# Patient Record
Sex: FEMALE | Race: WHITE | NOT HISPANIC OR LATINO | Employment: OTHER | URBAN - METROPOLITAN AREA
[De-identification: names, ages, dates, MRNs, and addresses within clinical notes are randomized per-mention and may not be internally consistent; named-entity substitution may affect disease eponyms.]

---

## 2017-01-06 DIAGNOSIS — M79.7 FIBROMYALGIA: Primary | ICD-10-CM

## 2017-01-06 RX ORDER — LORAZEPAM 0.5 MG/1
TABLET ORAL
Qty: 30 TABLET | Refills: 5 | Status: CANCELLED | OUTPATIENT
Start: 2017-01-06

## 2017-01-06 NOTE — TELEPHONE ENCOUNTER
Controlled Substance Refill Request for LORAZEPAM 0.5 MG  Problem List Complete:  No     PROVIDER TO CONSIDER COMPLETION OF PROBLEM LIST AND OVERVIEW/CONTROLLED SUBSTANCE AGREEMENT    Last Written Prescription Date:  6-30-16  Last Fill Quantity: 30,   # refills: 5    Last Office Visit with [facility] primary care provider: 6-30-16    Future Office visit:     Controlled substance agreement on file: Yes:  Date 7-7-16.     Processing:  ?   checked in past 6 months?  No, route to RN

## 2017-01-08 ENCOUNTER — HOSPITAL ENCOUNTER (EMERGENCY)
Facility: CLINIC | Age: 42
Discharge: HOME | End: 2017-01-08
Attending: EMERGENCY MEDICINE | Admitting: EMERGENCY MEDICINE
Payer: MEDICAID

## 2017-01-08 VITALS
DIASTOLIC BLOOD PRESSURE: 78 MMHG | SYSTOLIC BLOOD PRESSURE: 125 MMHG | RESPIRATION RATE: 16 BRPM | TEMPERATURE: 98.1 F | HEIGHT: 62 IN | OXYGEN SATURATION: 99 % | BODY MASS INDEX: 21.77 KG/M2 | WEIGHT: 118.3 LBS

## 2017-01-08 DIAGNOSIS — R06.00 DYSPNEA, UNSPECIFIED TYPE: ICD-10-CM

## 2017-01-08 LAB
ALBUMIN SERPL-MCNC: 4.2 G/DL (ref 3.4–5)
ALBUMIN UR-MCNC: 10 MG/DL
ALP SERPL-CCNC: 52 U/L (ref 40–150)
ALT SERPL W P-5'-P-CCNC: 22 U/L (ref 0–50)
AMPHETAMINES UR QL SCN: NORMAL
ANION GAP SERPL CALCULATED.3IONS-SCNC: 9 MMOL/L (ref 3–14)
APPEARANCE UR: ABNORMAL
AST SERPL W P-5'-P-CCNC: 22 U/L (ref 0–45)
BACTERIA #/AREA URNS HPF: ABNORMAL /HPF
BARBITURATES UR QL: NORMAL
BASOPHILS # BLD AUTO: 0 10E9/L (ref 0–0.2)
BASOPHILS NFR BLD AUTO: 0.2 %
BENZODIAZ UR QL: NORMAL
BILIRUB SERPL-MCNC: 0.3 MG/DL (ref 0.2–1.3)
BILIRUB UR QL STRIP: NEGATIVE
BUN SERPL-MCNC: 22 MG/DL (ref 7–30)
CALCIUM SERPL-MCNC: 8.1 MG/DL (ref 8.5–10.1)
CANNABINOIDS UR QL SCN: NORMAL
CHLORIDE SERPL-SCNC: 107 MMOL/L (ref 94–109)
CO2 SERPL-SCNC: 24 MMOL/L (ref 20–32)
COCAINE UR QL: NORMAL
COLOR UR AUTO: YELLOW
CREAT SERPL-MCNC: 1.06 MG/DL (ref 0.52–1.04)
D DIMER PPP FEU-MCNC: 0.4 UG/ML FEU (ref 0–0.5)
DIFFERENTIAL METHOD BLD: ABNORMAL
EOSINOPHIL # BLD AUTO: 0.2 10E9/L (ref 0–0.7)
EOSINOPHIL NFR BLD AUTO: 1.3 %
ERYTHROCYTE [DISTWIDTH] IN BLOOD BY AUTOMATED COUNT: 13 % (ref 10–15)
ETHANOL UR QL SCN: NORMAL
GFR SERPL CREATININE-BSD FRML MDRD: 57 ML/MIN/1.7M2
GLUCOSE SERPL-MCNC: 115 MG/DL (ref 70–99)
GLUCOSE UR STRIP-MCNC: NEGATIVE MG/DL
HCG UR QL: NEGATIVE
HCT VFR BLD AUTO: 44.9 % (ref 35–47)
HGB BLD-MCNC: 15.1 G/DL (ref 11.7–15.7)
HGB UR QL STRIP: NEGATIVE
IMM GRANULOCYTES # BLD: 0 10E9/L (ref 0–0.4)
IMM GRANULOCYTES NFR BLD: 0.2 %
KETONES UR STRIP-MCNC: 5 MG/DL
LACTATE BLD-SCNC: 1.7 MMOL/L (ref 0.7–2.1)
LEUKOCYTE ESTERASE UR QL STRIP: NEGATIVE
LYMPHOCYTES # BLD AUTO: 1.8 10E9/L (ref 0.8–5.3)
LYMPHOCYTES NFR BLD AUTO: 13.3 %
MCH RBC QN AUTO: 30 PG (ref 26.5–33)
MCHC RBC AUTO-ENTMCNC: 33.6 G/DL (ref 31.5–36.5)
MCV RBC AUTO: 89 FL (ref 78–100)
MONOCYTES # BLD AUTO: 1.1 10E9/L (ref 0–1.3)
MONOCYTES NFR BLD AUTO: 8.1 %
MUCOUS THREADS #/AREA URNS LPF: PRESENT /LPF
NEUTROPHILS # BLD AUTO: 10.6 10E9/L (ref 1.6–8.3)
NEUTROPHILS NFR BLD AUTO: 76.9 %
NITRATE UR QL: NEGATIVE
NRBC # BLD AUTO: 0 10*3/UL
NRBC BLD AUTO-RTO: 0 /100
OPIATES UR QL SCN: NORMAL
PH UR STRIP: 5.5 PH (ref 5–7)
PLATELET # BLD AUTO: 137 10E9/L (ref 150–450)
POTASSIUM SERPL-SCNC: 4.1 MMOL/L (ref 3.4–5.3)
PROT SERPL-MCNC: 7.5 G/DL (ref 6.8–8.8)
RBC # BLD AUTO: 5.03 10E12/L (ref 3.8–5.2)
RBC #/AREA URNS AUTO: 3 /HPF (ref 0–2)
SODIUM SERPL-SCNC: 140 MMOL/L (ref 133–144)
SP GR UR STRIP: 1.02 (ref 1–1.03)
SQUAMOUS #/AREA URNS AUTO: 7 /HPF (ref 0–1)
TSH SERPL DL<=0.005 MIU/L-ACNC: 1.34 MU/L (ref 0.4–4)
URN SPEC COLLECT METH UR: ABNORMAL
UROBILINOGEN UR STRIP-MCNC: NORMAL MG/DL (ref 0–2)
WBC # BLD AUTO: 13.8 10E9/L (ref 4–11)
WBC #/AREA URNS AUTO: 4 /HPF (ref 0–2)

## 2017-01-08 PROCEDURE — 25000128 H RX IP 250 OP 636: Performed by: EMERGENCY MEDICINE

## 2017-01-08 PROCEDURE — 81001 URINALYSIS AUTO W/SCOPE: CPT | Mod: XU | Performed by: EMERGENCY MEDICINE

## 2017-01-08 PROCEDURE — 96361 HYDRATE IV INFUSION ADD-ON: CPT | Performed by: EMERGENCY MEDICINE

## 2017-01-08 PROCEDURE — 85025 COMPLETE CBC W/AUTO DIFF WBC: CPT | Performed by: EMERGENCY MEDICINE

## 2017-01-08 PROCEDURE — 99284 EMERGENCY DEPT VISIT MOD MDM: CPT | Mod: 25 | Performed by: EMERGENCY MEDICINE

## 2017-01-08 PROCEDURE — 80307 DRUG TEST PRSMV CHEM ANLYZR: CPT | Performed by: EMERGENCY MEDICINE

## 2017-01-08 PROCEDURE — 80320 DRUG SCREEN QUANTALCOHOLS: CPT | Mod: 59 | Performed by: EMERGENCY MEDICINE

## 2017-01-08 PROCEDURE — 83605 ASSAY OF LACTIC ACID: CPT | Performed by: EMERGENCY MEDICINE

## 2017-01-08 PROCEDURE — 81025 URINE PREGNANCY TEST: CPT | Performed by: EMERGENCY MEDICINE

## 2017-01-08 PROCEDURE — 80053 COMPREHEN METABOLIC PANEL: CPT | Performed by: EMERGENCY MEDICINE

## 2017-01-08 PROCEDURE — 99283 EMERGENCY DEPT VISIT LOW MDM: CPT | Mod: Z6 | Performed by: EMERGENCY MEDICINE

## 2017-01-08 PROCEDURE — 85379 FIBRIN DEGRADATION QUANT: CPT | Performed by: EMERGENCY MEDICINE

## 2017-01-08 PROCEDURE — 84443 ASSAY THYROID STIM HORMONE: CPT | Performed by: EMERGENCY MEDICINE

## 2017-01-08 PROCEDURE — 96360 HYDRATION IV INFUSION INIT: CPT | Performed by: EMERGENCY MEDICINE

## 2017-01-08 RX ADMIN — SODIUM CHLORIDE 1000 ML: 9 INJECTION, SOLUTION INTRAVENOUS at 10:07

## 2017-01-08 ASSESSMENT — ENCOUNTER SYMPTOMS
EYE REDNESS: 0
VOMITING: 0
CONFUSION: 0
HEADACHES: 0
FEVER: 0
DIFFICULTY URINATING: 0
ABDOMINAL PAIN: 0
CHILLS: 1
NAUSEA: 0
SHORTNESS OF BREATH: 1
UNEXPECTED WEIGHT CHANGE: 0
COLOR CHANGE: 0
NECK STIFFNESS: 0
ARTHRALGIAS: 0

## 2017-01-08 NOTE — ED NOTES
"Clemencia comes to the ED today concerned she may be in a \"thyroid storm\" and would like to be checked.  This morning she has experienced SOB or \"air hunger\", tachycardia (monitor displays SR without ectopy with rate 98 to 107), and \"one after the other hot flashes\".  She appears anxious however is able to talk in full sentences.  Denies CP.  "

## 2017-01-08 NOTE — DISCHARGE INSTRUCTIONS
Shortness of Breath (Dyspnea)  Shortness of breath is the feeling that you can't catch your breath or get enough air. It is also known as dyspnea.  Dyspnea can be caused by many different conditions. They include:    Acute asthma attack.    Worsening of chronic lung diseases such as chronic bronchitis and emphysema.     Heart failure. This is when weak heart muscle allows extra fluid to collect in the lungs.    Panic attacks or anxiety. Fear can cause rapid breathing (hyperventilation).    Pneumonia, or an infection in the lung tissue.    Exposure to toxic substances, fumes, smoke, or certain medicines.    Blood clot in the lung (pulmonary embolism). This is often from a piece of blood clot in a deep vein of the leg (deep vein thrombosis) that breaks off and travels to the lungs.     Heart attack or heart-related chest pain (angina).    Anemia.    Collapsed lung (pneumothorax).    Dehydration.    Pregnancy.  Based on your visit today, the exact cause of your shortness of breath is not certain. Your tests don t show any of the serious causes of dyspnea. You may need other tests to find out if you have a serious problem. It s important to watch for any new symptoms or symptoms that get worse. Follow up with your healthcare provider as directed.  Home care  Follow these tips to take care of yourself at home:    When your symptoms are better, go back to your usual activities.    If you smoke, you should stop. Join a quit-smoking program or ask your healthcare provider for help.    Eat a healthy diet and get plenty of sleep.    Get regular exercise. Talk with your healthcare provider before starting to exercise, especially if you have other medical problems.    Cut down on the amount of caffeine and stimulants you consume.  Follow-up care  Follow up with your healthcare provider, or as advised.  If tests were done, you will be told if your treatment needs to be changed. You can call as directed for the  results.  (Note: If an X-ray was taken, a specialist will review it. You will be notified of any new findings that may affect your care.)  Call 911 or get immediate medical care  Shortness of breath may be a sign of a serious medical problem. For example, it may be a problem with your heart or lungs. Call 911 if you have worsening shortness of breath or trouble breathing, especially with any of the symptoms below:    You are confused or it s difficult to wake you.    You faint or lose consciousness.    You have a fast heartbeat, or your heartbeat is irregular.     You are coughing up blood.    You have pain in your chest, arm, shoulder, neck, or upper back.    You break out in a sweat.  When to seek medical advice  Call your healthcare provider right away if any of these occur:    Slight shortness of breath or wheezing     Redness, pain or swelling in your leg, arm, or other body area    Swelling in both legs or ankles    Fast weight gain    Dizziness or weakness    Fever of 100.4 F (38 C) or higher, or as directed by your healthcare provider    1036-7693 [employer]. [address] [place] [address]. All rights reserved. This information is not intended as a substitute for professional medical care. Always follow your healthcare professional's instructions.      Continue current medications

## 2017-01-08 NOTE — ED AVS SNAPSHOT
[facility], Emergency Department    [address] [address]    Phone:  [phone]                                       Clemencia Rosales   MRN: [id number]    Department:  [facility], Emergency Department   Date of Visit:  1/8/2017           Patient Information     Date Of Birth          1975        Your diagnoses for this visit were:     Dyspnea, unspecified type        You were seen by Trevor Graham MD.      Follow-up Information     Follow up with Neda Damon APRN CNP.    Specialty:  Nurse Practitioner    Contact information:    [facility]  [address] [place] [address]  [phone]          Discharge Instructions         Shortness of Breath (Dyspnea)  Shortness of breath is the feeling that you can't catch your breath or get enough air. It is also known as dyspnea.  Dyspnea can be caused by many different conditions. They include:    Acute asthma attack.    Worsening of chronic lung diseases such as chronic bronchitis and emphysema.     Heart failure. This is when weak heart muscle allows extra fluid to collect in the lungs.    Panic attacks or anxiety. Fear can cause rapid breathing (hyperventilation).    Pneumonia, or an infection in the lung tissue.    Exposure to toxic substances, fumes, smoke, or certain medicines.    Blood clot in the lung (pulmonary embolism). This is often from a piece of blood clot in a deep vein of the leg (deep vein thrombosis) that breaks off and travels to the lungs.     Heart attack or heart-related chest pain (angina).    Anemia.    Collapsed lung (pneumothorax).    Dehydration.    Pregnancy.  Based on your visit today, the exact cause of your shortness of breath is not certain. Your tests don t show any of the serious causes of dyspnea. You may need other tests to find out if you have a serious problem. It s important to watch for any new symptoms or symptoms that get worse. Follow up with your healthcare  provider as directed.  Home care  Follow these tips to take care of yourself at home:    When your symptoms are better, go back to your usual activities.    If you smoke, you should stop. Join a quit-smoking program or ask your healthcare provider for help.    Eat a healthy diet and get plenty of sleep.    Get regular exercise. Talk with your healthcare provider before starting to exercise, especially if you have other medical problems.    Cut down on the amount of caffeine and stimulants you consume.  Follow-up care  Follow up with your healthcare provider, or as advised.  If tests were done, you will be told if your treatment needs to be changed. You can call as directed for the results.  (Note: If an X-ray was taken, a specialist will review it. You will be notified of any new findings that may affect your care.)  Call 911 or get immediate medical care  Shortness of breath may be a sign of a serious medical problem. For example, it may be a problem with your heart or lungs. Call 911 if you have worsening shortness of breath or trouble breathing, especially with any of the symptoms below:    You are confused or it s difficult to wake you.    You faint or lose consciousness.    You have a fast heartbeat, or your heartbeat is irregular.     You are coughing up blood.    You have pain in your chest, arm, shoulder, neck, or upper back.    You break out in a sweat.  When to seek medical advice  Call your healthcare provider right away if any of these occur:    Slight shortness of breath or wheezing     Redness, pain or swelling in your leg, arm, or other body area    Swelling in both legs or ankles    Fast weight gain    Dizziness or weakness    Fever of 100.4 F (38 C) or higher, or as directed by your healthcare provider    [id number] [employer]. [address] [place] [address], [place] [address]. All rights reserved. This information is not intended as a substitute for professional medical care. Always follow  your healthcare professional's instructions.      Continue current medications    24 Hour Appointment Hotline       To make an appointment at any [facility], call [phone] ([phone]). If you don't have a family doctor or clinic, we will help you find one. [facility] clinics are conveniently located to serve the needs of you and your family.             Review of your medicines      Our records show that you are taking the medicines listed below. If these are incorrect, please call your family doctor or clinic.        Dose / Directions Last dose taken    albuterol 108 (90 BASE) MCG/ACT Inhaler   Commonly known as:  albuterol   Dose:  2 puff   Quantity:  1 Inhaler        Inhale 2 puffs into the lungs every 4 hours as needed   Refills:  11        aspirin 325 MG tablet        Take  by mouth daily. Takes 2 tablets as needed   Refills:  0        BENADRYL PO        AS NEEDED   Refills:  0        cyclobenzaprine 10 MG tablet   Commonly known as:  FLEXERIL   Dose:  5-10 mg   Quantity:  30 tablet        Take 0.5-1 tablets (5-10 mg) by mouth 3 times daily as needed for muscle spasms   Refills:  5        fluticasone 50 MCG/ACT spray   Commonly known as:  FLONASE   Dose:  2 spray   Quantity:  16 g        Spray 2 sprays into both nostrils daily   Refills:  11        gabapentin 300 MG capsule   Commonly known as:  NEURONTIN   Dose:  300 mg   Quantity:  90 capsule        Take 1 capsule (300 mg) by mouth 3 times daily   Refills:  5        hydrocortisone 2.5 % cream        Refills:  2        LORazepam 0.5 MG tablet   Commonly known as:  ATIVAN   Quantity:  30 tablet        Take 1 tablet by mouth every 8 hours as needed for anxiety. 30 tablets to last 30 days.   Refills:  5        metoprolol 50 MG 24 hr tablet   Commonly known as:  TOPROL-XL   Dose:  50 mg   Quantity:  90 tablet        Take 1 tablet (50 mg) by mouth daily   Refills:  0        phenylephrine 0.25 % spray   Commonly known as:  CASTRO-SYNEPHRINE   Dose:  1  spray        Spray 1 spray into both nostrils every 4 hours as needed for congestion   Refills:  0        SYNTHROID 112 MCG tablet   Generic drug:  levothyroxine        Take  by mouth daily.   Refills:  0        VITAMIN D PO        Refills:  0                Procedures and tests performed during your visit     CBC with platelets differential    Comprehensive metabolic panel    D dimer quantitative    Drug abuse screen 6 urine (tox)    HCG qualitative urine    Lactic acid whole blood    TSH with free T4 reflex    UA with Microscopic reflex to Culture    Weigh patient      Orders Needing Specimen Collection     None      Pending Results     Date and Time Order Name Status Description    1/8/2017 0952 Lactic acid whole blood In process             Pending Culture Results     No orders found from 1/7/2017 to 1/9/2017.            Thank you for choosing [facility]       Thank you for choosing [facility] for your care. Our goal is always to provide you with excellent care. Hearing back from our patients is one way we can continue to improve our services. Please take a few minutes to complete the written survey that you may receive in the mail after you visit with us. Thank you!        [facility] Information     [employer] gives you secure access to your electronic health record. If you see a primary care provider, you can also send messages to your care team and make appointments. If you have questions, please call your primary care clinic.  If you do not have a primary care provider, please call [phone] and they will assist you.        Care EveryWhere ID     This is your Care EveryWhere ID. This could be used by other organizations to access your [facility] medical records  [id number]        After Visit Summary       This is your record. Keep this with you and show to your community pharmacist(s) and doctor(s) at your next visit.

## 2017-01-08 NOTE — ED PROVIDER NOTES
"  History     Chief Complaint   Patient presents with     Shortness of Breath     Fatigue     Menopausal Sx     Palpitations     HPI  Clemencia JESSIKA Rosales is a 41 year old female with a history of fibromyalgia and thyrotoxicosis (2002) who presents to the Emergency Department with multiple complaints. She states that she came in today because of hot flashes, chills and \"air hunger\". Patient reports that she has felt unwell for since Winter began and thought her symptoms would improve. Patient has had these symptoms since her thyroidectomy. She states that she is sensitive to seasonal changes, atmosphere changes, and full moons. The patient states that her hot flashes have increased in frequency. Patient states that her electrolytes are often out of balance. She states that she is concerned of a \"thyroid storm\". Patient reports that last night she was exposed to her allergens and took Benadryl yesterday night.     No weight loss. No nausea or vomiting. Patient states that her right ring finger becomes purple in the evening and this has been ongoing for the past 2 years.     She reports a history of Grave's disease and trigeminal neuralgia. She reports she has has sinus tach since 2002. Patient states that for the past few years she has been on a beta blocker.   She states that her menstrual periods have been regular.    I have reviewed the Medications, Allergies, Past Medical and Surgical History, and Social History in the [facility] system.    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 " "[facility] [place]; lung bx 2007 bronchiolitis and bronchiectasis,; had severe viral pneumonia age 15     MEDICAL HISTORY OF -      20 ativan per month (menstral period/BOOP)     Sinus tachycardia      Arthritis      Head pain 1/16/2015     Temporary low platelet count (H) 6/12/2014     PAST SURGICAL HISTORY  Past Surgical History   Procedure Laterality Date     Thoracoscopic biopsy rib       x4     FAMILY HISTORY  Family History   Problem Relation Age of Onset     DIABETES Paternal Aunt      DIABETES Paternal Uncle      Thyroid Disease No family hx of      female family members has have thyroid issues     Asthma       Asthma       Asthma Paternal Uncle      SOCIAL HISTORY  Social History   Substance Use Topics     Smoking status: Never Smoker      Smokeless tobacco: Never Used     Alcohol Use: No     MEDICATIONS  No current facility-administered medications for this encounter.     Current Outpatient Prescriptions   Medication     cyclobenzaprine (FLEXERIL) 10 MG tablet     gabapentin (NEURONTIN) 300 MG capsule     metoprolol (TOPROL-XL) 50 MG 24 hr tablet     LORazepam (ATIVAN) 0.5 MG tablet     fluticasone (FLONASE) 50 MCG/ACT nasal spray     albuterol (ALBUTEROL) 108 (90 BASE) MCG/ACT inhaler     hydrocortisone 2.5 % cream     phenylephrine (CASTRO-SYNEPHRINE) 0.25 % nasal spray     Cholecalciferol (VITAMIN D PO)     levothyroxine (SYNTHROID) 112 MCG tablet     aspirin 325 MG tablet     BENADRYL OR     ALLERGIES  Allergies   Allergen Reactions     Sulfa Drugs Other (See Comments)     Never taken medication however \"was told to put it down just in case\"       Review of Systems   Constitutional: Positive for chills. Negative for fever and unexpected weight change.   HENT: Negative for congestion.    Eyes: Negative for redness.   Respiratory: Positive for shortness of breath.    Cardiovascular: Negative for chest pain.   Gastrointestinal: Negative for nausea, vomiting and abdominal pain.   Genitourinary: Negative for " "difficulty urinating.   Musculoskeletal: Negative for arthralgias and neck stiffness.   Skin: Negative for color change.   Neurological: Negative for headaches.   Psychiatric/Behavioral: Negative for confusion.   All other systems reviewed and are negative.      Physical Exam   BP: (!) 150/96 mmHg  Heart Rate: 106  Temp: 96.4  F (35.8  C)  Resp: 18  Height: 157.5 cm (5' 2\")  Weight: 50.803 kg (112 lb)  SpO2: 98 %  Physical Exam   Constitutional: No distress.   HENT:   Head: Atraumatic.   Mouth/Throat: Oropharynx is clear and moist. No oropharyngeal exudate.   Eyes: Pupils are equal, round, and reactive to light. No scleral icterus.   Cardiovascular: Normal heart sounds and intact distal pulses.    Pulmonary/Chest: Breath sounds normal. No respiratory distress.   Abdominal: Soft. Bowel sounds are normal. There is no tenderness.   Musculoskeletal: She exhibits no edema or tenderness.   Skin: Skin is warm. No rash noted. She is not diaphoretic.       ED Course   Procedures             Results for orders placed or performed during the hospital encounter of 01/08/17 (from the past 24 hour(s))   CBC with platelets differential   Result Value Ref Range    WBC 13.8 (H) 4.0 - 11.0 10e9/L    RBC Count 5.03 3.8 - 5.2 10e12/L    Hemoglobin 15.1 11.7 - 15.7 g/dL    Hematocrit 44.9 35.0 - 47.0 %    MCV 89 78 - 100 fl    MCH 30.0 26.5 - 33.0 pg    MCHC 33.6 31.5 - 36.5 g/dL    RDW 13.0 10.0 - 15.0 %    Platelet Count 137 (L) 150 - 450 10e9/L    Diff Method Automated Method     % Neutrophils 76.9 %    % Lymphocytes 13.3 %    % Monocytes 8.1 %    % Eosinophils 1.3 %    % Basophils 0.2 %    % Immature Granulocytes 0.2 %    Nucleated RBCs 0 0 /100    Absolute Neutrophil 10.6 (H) 1.6 - 8.3 10e9/L    Absolute Lymphocytes 1.8 0.8 - 5.3 10e9/L    Absolute Monocytes 1.1 0.0 - 1.3 10e9/L    Absolute Eosinophils 0.2 0.0 - 0.7 10e9/L    Absolute Basophils 0.0 0.0 - 0.2 10e9/L    Abs Immature Granulocytes 0.0 0 - 0.4 10e9/L    Absolute " Nucleated RBC 0.0    Comprehensive metabolic panel   Result Value Ref Range    Sodium 140 133 - 144 mmol/L    Potassium 4.1 3.4 - 5.3 mmol/L    Chloride 107 94 - 109 mmol/L    Carbon Dioxide 24 20 - 32 mmol/L    Anion Gap 9 3 - 14 mmol/L    Glucose 115 (H) 70 - 99 mg/dL    Urea Nitrogen 22 7 - 30 mg/dL    Creatinine 1.06 (H) 0.52 - 1.04 mg/dL    GFR Estimate 57 (L) >60 mL/min/1.7m2    GFR Estimate If Black 69 >60 mL/min/1.7m2    Calcium 8.1 (L) 8.5 - 10.1 mg/dL    Bilirubin Total 0.3 0.2 - 1.3 mg/dL    Albumin 4.2 3.4 - 5.0 g/dL    Protein Total 7.5 6.8 - 8.8 g/dL    Alkaline Phosphatase 52 40 - 150 U/L    ALT 22 0 - 50 U/L    AST 22 0 - 45 U/L   D dimer quantitative   Result Value Ref Range    D Dimer 0.4 0.0 - 0.50 ug/ml FEU   TSH with free T4 reflex   Result Value Ref Range    TSH 1.34 0.40 - 4.00 mU/L   Lactic acid whole blood   Result Value Ref Range    Lactic Acid 1.7 0.7 - 2.1 mmol/L   Drug abuse screen 6 urine (tox)   Result Value Ref Range    Amphetamine Qual Urine  NEG     Negative   Cutoff for a negative amphetamine is 500 ng/mL or less.      Barbiturates Qual Urine  NEG     Negative   Cutoff for a negative barbiturate is 200 ng/mL or less.      Benzodiazepine Qual Urine  NEG     Negative   Cutoff for a negative benzodiazepine is 200 ng/mL or less.      Cannabinoids Qual Urine  NEG     Negative   Cutoff for a negative cannabinoid is 50 ng/mL or less.      Cocaine Qual Urine  NEG     Negative   Cutoff for a negative cocaine is 300 ng/mL or less.      Ethanol Qual Urine  NEG     Negative   Cutoff for a negative urine ethanol is 0.05 g/dL or less      Opiates Qualitative Urine  NEG     Negative   Cutoff for a negative opiate is 300 ng/mL or less.     HCG qualitative urine   Result Value Ref Range    HCG Qual Urine Negative NEG   UA with Microscopic reflex to Culture   Result Value Ref Range    Color Urine Yellow     Appearance Urine Slightly Cloudy     Glucose Urine Negative NEG mg/dL    Bilirubin Urine  "Negative NEG    Ketones Urine 5 (A) NEG mg/dL    Specific Gravity Urine 1.025 1.003 - 1.035    Blood Urine Negative NEG    pH Urine 5.5 5.0 - 7.0 pH    Protein Albumin Urine 10 (A) NEG mg/dL    Urobilinogen mg/dL Normal 0.0 - 2.0 mg/dL    Nitrite Urine Negative NEG    Leukocyte Esterase Urine Negative NEG    Source Midstream Urine     WBC Urine 4 (H) 0 - 2 /HPF    RBC Urine 3 (H) 0 - 2 /HPF    Bacteria Urine Few (A) NEG /HPF    Squamous Epithelial /HPF Urine 7 (H) 0 - 1 /HPF    Mucous Urine Present (A) NEG /LPF       Labs Ordered and Resulted from Time of ED Arrival Up to the Time of Departure from the ED   CBC WITH PLATELETS DIFFERENTIAL - Abnormal; Notable for the following:     WBC 13.8 (*)     Platelet Count 137 (*)     Absolute Neutrophil 10.6 (*)     All other components within normal limits   COMPREHENSIVE METABOLIC PANEL - Abnormal; Notable for the following:     Glucose 115 (*)     Creatinine 1.06 (*)     GFR Estimate 57 (*)     Calcium 8.1 (*)     All other components within normal limits   ROUTINE UA WITH MICROSCOPIC REFLEX TO CULTURE - Abnormal; Notable for the following:     Ketones Urine 5 (*)     Protein Albumin Urine 10 (*)     WBC Urine 4 (*)     RBC Urine 3 (*)     Bacteria Urine Few (*)     Squamous Epithelial /HPF Urine 7 (*)     Mucous Urine Present (*)     All other components within normal limits   DRUG ABUSE SCREEN 6 CHEM DEP URINE ([facility])   HCG QUALITATIVE URINE   D DIMER QUANTITATIVE   TSH WITH FREE T4 REFLEX   MEASURE WEIGHT       Assessments & Plan (with Medical Decision Making)   41-year-old female with long-standing history of Graves' disease presents with multiple complaints including air hunger, malaise, hot flashes and other symptoms as noted above.  Most of these symptoms have been ongoing for the past multiple years.  Patient was most concerned about \"thyroid storm\". Her exam was largely unremarkable with exception of mild anxiety.  Laboratories were obtained revealing a CBC with " "slightly elevated white blood count of 13.8 consistent with stress response.  Urinalysis revealed scant white cells and red cells but was contaminated with squamous epithelial cells and without clinical correlation of infection.  Lactic acid was 1.7 and pregnancy test was negative.  Comprehensive metabolic panel revealed slightly elevated glucose at 115 consistent with stress response and slightly elevated creatinine.  D-dimer was negative and TSH was 1.34.  I reassured the patient that she was not in thyroid storm.  She stated that she felt much improved after a solitary liter of fluids.  I had recommended a chest x-ray for her symptoms of \"air hunger\" however, patient declined this stating that she felt improved and would follow up with her primary physician.  I suspect that anxiety may be playing a role in her symptoms.  I have reviewed the nursing notes.    I have reviewed the findings, diagnosis, plan and need for follow up with the patient.    Discharge Medication List as of 1/8/2017 12:09 PM          Final diagnoses:   Dyspnea, unspecified type     I, Deepak Erazo, am serving as a trained medical scribe to document services personally performed by Shahid Graham MD, based on the provider's statements to me.      I, Shahid Graham MD, was physically present and have reviewed and verified the accuracy of this note documented by Deepak Erazo.    1/8/2017   [facility], [place], EMERGENCY DEPARTMENT      Trevor Graham MD  01/08/17 1609  "

## 2017-01-08 NOTE — ED AVS SNAPSHOT
[facility], [place], Emergency Department    [address] [place] [address]    Phone:  [phone]                                       Clemencia Rosales   MRN: [id number]    Department:  [facility], Emergency Department   Date of Visit:  1/8/2017           After Visit Summary Signature Page     I have received my discharge instructions, and my questions have been answered. I have discussed any challenges I see with this plan with the nurse or doctor.    ..........................................................................................................................................  Patient/Patient Representative Signature      ..........................................................................................................................................  Patient Representative Print Name and Relationship to Patient    ..................................................               ................................................  Date                                            Time    ..........................................................................................................................................  Reviewed by Signature/Title    ...................................................              ..............................................  Date                                                            Time

## 2017-01-09 NOTE — TELEPHONE ENCOUNTER
Clemencia is due a face-to-face appointment for her lorazepam refills. Please call her to schedule an appointment.

## 2017-01-16 ENCOUNTER — OFFICE VISIT (OUTPATIENT)
Dept: FAMILY MEDICINE | Facility: CLINIC | Age: 42
End: 2017-01-16
Payer: MEDICAID

## 2017-01-16 VITALS
DIASTOLIC BLOOD PRESSURE: 68 MMHG | BODY MASS INDEX: 22.08 KG/M2 | TEMPERATURE: 98 F | HEART RATE: 127 BPM | SYSTOLIC BLOOD PRESSURE: 98 MMHG | OXYGEN SATURATION: 95 % | HEIGHT: 62 IN | WEIGHT: 120 LBS

## 2017-01-16 DIAGNOSIS — D69.6 TEMPORARY LOW PLATELET COUNT (H): ICD-10-CM

## 2017-01-16 DIAGNOSIS — Z86.79 H/O SINUS TACHYCARDIA: ICD-10-CM

## 2017-01-16 DIAGNOSIS — J44.81 BRONCHIOLITIS OBLITERANS (H): ICD-10-CM

## 2017-01-16 DIAGNOSIS — G44.219 EPISODIC TENSION-TYPE HEADACHE, NOT INTRACTABLE: ICD-10-CM

## 2017-01-16 DIAGNOSIS — M79.7 FIBROMYALGIA: Primary | ICD-10-CM

## 2017-01-16 LAB
BASOPHILS # BLD AUTO: 0 10E9/L (ref 0–0.2)
BASOPHILS NFR BLD AUTO: 0.5 %
DIFFERENTIAL METHOD BLD: NORMAL
EOSINOPHIL # BLD AUTO: 0.3 10E9/L (ref 0–0.7)
EOSINOPHIL NFR BLD AUTO: 5.1 %
ERYTHROCYTE [DISTWIDTH] IN BLOOD BY AUTOMATED COUNT: 12.9 % (ref 10–15)
HCT VFR BLD AUTO: 42.5 % (ref 35–47)
HGB BLD-MCNC: 14 G/DL (ref 11.7–15.7)
LYMPHOCYTES # BLD AUTO: 1.5 10E9/L (ref 0.8–5.3)
LYMPHOCYTES NFR BLD AUTO: 25.7 %
MCH RBC QN AUTO: 30 PG (ref 26.5–33)
MCHC RBC AUTO-ENTMCNC: 32.9 G/DL (ref 31.5–36.5)
MCV RBC AUTO: 91 FL (ref 78–100)
MONOCYTES # BLD AUTO: 0.5 10E9/L (ref 0–1.3)
MONOCYTES NFR BLD AUTO: 8.4 %
NEUTROPHILS # BLD AUTO: 3.5 10E9/L (ref 1.6–8.3)
NEUTROPHILS NFR BLD AUTO: 60.3 %
PLATELET # BLD AUTO: 165 10E9/L (ref 150–450)
RBC # BLD AUTO: 4.66 10E12/L (ref 3.8–5.2)
WBC # BLD AUTO: 5.8 10E9/L (ref 4–11)

## 2017-01-16 PROCEDURE — 99214 OFFICE O/P EST MOD 30 MIN: CPT | Performed by: NURSE PRACTITIONER

## 2017-01-16 PROCEDURE — 36415 COLL VENOUS BLD VENIPUNCTURE: CPT | Performed by: NURSE PRACTITIONER

## 2017-01-16 PROCEDURE — 85025 COMPLETE CBC W/AUTO DIFF WBC: CPT | Performed by: NURSE PRACTITIONER

## 2017-01-16 RX ORDER — LORAZEPAM 0.5 MG/1
TABLET ORAL
Qty: 30 TABLET | Refills: 5 | Status: SHIPPED | OUTPATIENT
Start: 2017-01-16 | End: 2017-08-10

## 2017-01-16 RX ORDER — FLUTICASONE PROPIONATE 50 MCG
2 SPRAY, SUSPENSION (ML) NASAL DAILY
Qty: 16 G | Refills: 11 | Status: SHIPPED | OUTPATIENT
Start: 2017-01-16 | End: 2018-03-15

## 2017-01-16 RX ORDER — CYCLOBENZAPRINE HCL 10 MG
5-10 TABLET ORAL 3 TIMES DAILY PRN
Qty: 30 TABLET | Refills: 5 | Status: SHIPPED | OUTPATIENT
Start: 2017-01-16 | End: 2017-10-02

## 2017-01-16 RX ORDER — METOPROLOL SUCCINATE 50 MG/1
50 TABLET, EXTENDED RELEASE ORAL DAILY
Qty: 90 TABLET | Refills: 3 | Status: SHIPPED | OUTPATIENT
Start: 2017-01-16 | End: 2018-02-04

## 2017-01-16 RX ORDER — GABAPENTIN 300 MG/1
300 CAPSULE ORAL 3 TIMES DAILY
Qty: 90 CAPSULE | Refills: 11 | Status: SHIPPED | OUTPATIENT
Start: 2017-01-16 | End: 2018-03-08

## 2017-01-16 RX ORDER — ALBUTEROL SULFATE 90 UG/1
1-2 AEROSOL, METERED RESPIRATORY (INHALATION) EVERY 4 HOURS PRN
Qty: 1 INHALER | Refills: 11 | Status: SHIPPED | OUTPATIENT
Start: 2017-01-16 | End: 2018-02-08

## 2017-01-16 ASSESSMENT — ANXIETY QUESTIONNAIRES
2. NOT BEING ABLE TO STOP OR CONTROL WORRYING: SEVERAL DAYS
GAD7 TOTAL SCORE: 6
6. BECOMING EASILY ANNOYED OR IRRITABLE: SEVERAL DAYS
1. FEELING NERVOUS, ANXIOUS, OR ON EDGE: SEVERAL DAYS
3. WORRYING TOO MUCH ABOUT DIFFERENT THINGS: SEVERAL DAYS
5. BEING SO RESTLESS THAT IT IS HARD TO SIT STILL: NOT AT ALL
7. FEELING AFRAID AS IF SOMETHING AWFUL MIGHT HAPPEN: NOT AT ALL

## 2017-01-16 ASSESSMENT — PATIENT HEALTH QUESTIONNAIRE - PHQ9: 5. POOR APPETITE OR OVEREATING: MORE THAN HALF THE DAYS

## 2017-01-16 NOTE — MR AVS SNAPSHOT
After Visit Summary   1/16/2017    Clemencia Rosales    MRN: [id number]           Patient Information     Date Of Birth          1975        Visit Information        Provider Department      1/16/2017 1:20 PM Neda Damon APRN CNP [facility]        Today's Diagnoses     Fibromyalgia    -  1     H/O sinus tachycardia         Episodic tension-type headache, not intractable         obliterative bronchiolitis         Temporary low platelet count (H)           Care Instructions    Continue your medications as prescribed.  Use your lorazepam sparingly.    Clinic appointments with me cathie 6 months, sooner if problems.     Platelets: your platelets have been low.  I would like you to follow up with hematology for a consultation.            Follow-ups after your visit        Additional Services     ONC/HEME ADULT REFERRAL       Your provider has referred you to: [facility]: [facility] [phone]    Please be aware that coverage of these services is subject to the terms and limitations of your health insurance plan.  Call member services at your health plan with any benefit or coverage questions.      Please bring the following with you to your appointment:    (1) Any X-Rays, CTs or MRIs which have been performed.  Contact the facility where they were done to arrange for  prior to your scheduled appointment.   (2) List of current medications  (3) This referral request   (4) Any documents/labs given to you for this referral                  Who to contact     If you have questions or need follow up information about today's clinic visit or your schedule please contact [facility] directly at [phone].  Normal or non-critical lab and imaging results will be communicated to you by MyChart, letter or phone within 4 business days after the clinic has received the results. If you do not hear from us  "within 7 days, please contact the clinic through [employer] or phone. If you have a critical or abnormal lab result, we will notify you by phone as soon as possible.  Submit refill requests through [employer] or call your pharmacy and they will forward the refill request to us. Please allow 3 business days for your refill to be completed.          Additional Information About Your Visit        [employer] Information     [employer] gives you secure access to your electronic health record. If you see a primary care provider, you can also send messages to your care team and make appointments. If you have questions, please call your primary care clinic.  If you do not have a primary care provider, please call [phone] and they will assist you.        Care EveryWhere ID     This is your Care EveryWhere ID. This could be used by other organizations to access your [place] medical records  [id number]        Your Vitals Were     Pulse Temperature Height BMI (Body Mass Index) Pulse Oximetry Last Period    127 98  F (36.7  C) (Oral) 5' 2\" (1.575 m) 21.94 kg/m2 95% 12/20/2016       Blood Pressure from Last 3 Encounters:   01/16/17 98/68   01/08/17 125/78   06/30/16 108/75    Weight from Last 3 Encounters:   01/16/17 120 lb (54.432 kg)   01/08/17 118 lb 4.8 oz (53.661 kg)   06/30/16 112 lb (50.803 kg)              We Performed the Following     CBC with platelets and differential     ONC/HEME ADULT REFERRAL          Today's Medication Changes          These changes are accurate as of: 1/16/17  1:42 PM.  If you have any questions, ask your nurse or doctor.               These medicines have changed or have updated prescriptions.        Dose/Directions    albuterol 108 (90 BASE) MCG/ACT Inhaler   Commonly known as:  albuterol   This may have changed:  how much to take   Used for:  Bronchiolitis obliterans (H)   Changed by:  Neda Damon APRN CNP        Dose:  1-2 puff   Inhale 1-2 puffs into the lungs every 4 hours as " needed   Quantity:  1 Inhaler   Refills:  11            Where to get your medicines      These medications were sent to [id number] - [facility] [address] [place], [place] [address]GHAZALA AVJAD AT [facility] & [address] [place] [address]    Hours:  24-hours Phone:  [phone]    - albuterol 108 (90 BASE) MCG/ACT Inhaler  - cyclobenzaprine 10 MG tablet  - fluticasone 50 MCG/ACT spray  - gabapentin 300 MG capsule  - metoprolol 50 MG 24 hr tablet      Some of these will need a paper prescription and others can be bought over the counter.  Ask your nurse if you have questions.     Bring a paper prescription for each of these medications    - LORazepam 0.5 MG tablet             Primary Care Provider Office Phone # Fax #    ARDEN Patterson [phone]       [facility] [address] [place] [address]        Thank you!     Thank you for choosing [facility]  for your care. Our goal is always to provide you with excellent care. Hearing back from our patients is one way we can continue to improve our services. Please take a few minutes to complete the written survey that you may receive in the mail after your visit with us. Thank you!             Your Updated Medication List - Protect others around you: Learn how to safely use, store and throw away your medicines at www.disposemymeds.org.          This list is accurate as of: 1/16/17  1:42 PM.  Always use your most recent med list.                   Brand Name Dispense Instructions for use    albuterol 108 (90 BASE) MCG/ACT Inhaler    albuterol    1 Inhaler    Inhale 1-2 puffs into the lungs every 4 hours as needed       aspirin 325 MG tablet      Take  by mouth daily. Takes 2 tablets as needed       BENADRYL PO      AS NEEDED       cyclobenzaprine 10 MG tablet    FLEXERIL    30 tablet    Take 0.5-1 tablets (5-10 mg) by mouth 3 times daily as needed for muscle spasms        fluticasone 50 MCG/ACT spray    FLONASE    16 g    Spray 2 sprays into both nostrils daily       gabapentin 300 MG capsule    NEURONTIN    90 capsule    Take 1 capsule (300 mg) by mouth 3 times daily       hydrocortisone 2.5 % cream          LORazepam 0.5 MG tablet    ATIVAN    30 tablet    Take 1 tablet by mouth every 8 hours as needed for anxiety. 30 tablets to last 30 days.       metoprolol 50 MG 24 hr tablet    TOPROL-XL    90 tablet    Take 1 tablet (50 mg) by mouth daily       phenylephrine 0.25 % spray    CASTRO-SYNEPHRINE     [place] 1 spray into both nostrils every 4 hours as needed for congestion       SYNTHROID 112 MCG tablet   Generic drug:  levothyroxine      Take  by mouth daily.       VITAMIN D PO

## 2017-01-16 NOTE — NURSING NOTE
"Chief Complaint   Patient presents with     Anxiety       Initial BP 98/68 mmHg  Pulse 127  Temp(Src) 98  F (36.7  C) (Oral)  Ht 5' 2\" (1.575 m)  Wt 120 lb (54.432 kg)  BMI 21.94 kg/m2  SpO2 95%  LMP 12/20/2016 Estimated body mass index is 21.94 kg/(m^2) as calculated from the following:    Height as of this encounter: 5' 2\" (1.575 m).    Weight as of this encounter: 120 lb (54.432 kg).  BP completed using cuff size: devon Heaton MA       "

## 2017-01-16 NOTE — PROGRESS NOTES
"  SUBJECTIVE:                                                    Clemencia Rosales is a 41 year old female who presents to clinic today for the following health issues:      ED/UC Followup:    Facility:  [facility]   Date of visit: 01/08/2017  Reason for visit: air hunger- hot flashes and dehydration   Current Status:     Also would like to refill medications.        Clemencia is in the clinic today for a routine follow up for her medications and refills as well as for an ER follow up.    ER: she had an episode of dehydration.  She is feeling much better overall.    Breathing:  Stable at this time.    Thyroid.  Thyroid level in the ER was \"off.\"  She goes to endocrinology.     Headaches:  On gabapentin.    Perimenopause.  Having symptoms of hot flashes starting.  She also gets a burning tongue sensation with her menstrual cycle.        Problem list and histories reviewed & adjusted, as indicated.  Additional history: as documented    Patient Active Problem List   Diagnosis     Thyrotoxicosis     Other congenital anomaly of lung     Fibromyalgia     CARDIOVASCULAR SCREENING; LDL GOAL LESS THAN 160     Seasonal allergic rhinitis     Hypothyroidism     RLQ abdominal pain     Polyarthralgia     obliterative bronchiolitis     H/O sinus tachycardia     Temporary low platelet count (H)     Head pain     Pain of right upper extremity     Controlled substance agreement signed     Past Surgical History   Procedure Laterality Date     Thoracoscopic biopsy rib       x4       Social History   Substance Use Topics     Smoking status: Never Smoker      Smokeless tobacco: Never Used     Alcohol Use: No     Family History   Problem Relation Age of Onset     DIABETES Paternal Aunt      DIABETES Paternal Uncle      Thyroid Disease No family hx of      female family members has have thyroid issues     Asthma       Asthma       Asthma Paternal Uncle          Current Outpatient Prescriptions   Medication Sig Dispense Refill     LORazepam " "(ATIVAN) 0.5 MG tablet Take 1 tablet by mouth every 8 hours as needed for anxiety. 30 tablets to last 30 days. 30 tablet 5     metoprolol (TOPROL-XL) 50 MG 24 hr tablet Take 1 tablet (50 mg) by mouth daily 90 tablet 3     gabapentin (NEURONTIN) 300 MG capsule Take 1 capsule (300 mg) by mouth 3 times daily 90 capsule 11     cyclobenzaprine (FLEXERIL) 10 MG tablet Take 0.5-1 tablets (5-10 mg) by mouth 3 times daily as needed for muscle spasms 30 tablet 5     fluticasone (FLONASE) 50 MCG/ACT spray Spray 2 sprays into both nostrils daily 16 g 11     albuterol (ALBUTEROL) 108 (90 BASE) MCG/ACT Inhaler Inhale 1-2 puffs into the lungs every 4 hours as needed 1 Inhaler 11     hydrocortisone 2.5 % cream   2     phenylephrine (CASTRO-SYNEPHRINE) 0.25 % nasal spray Spray 1 spray into both nostrils every 4 hours as needed for congestion       Cholecalciferol (VITAMIN D PO)        levothyroxine (SYNTHROID) 112 MCG tablet Take  by mouth daily.       aspirin 325 MG tablet Take  by mouth daily. Takes 2 tablets as needed       BENADRYL OR AS NEEDED       [DISCONTINUED] gabapentin (NEURONTIN) 300 MG capsule Take 1 capsule (300 mg) by mouth 3 times daily 90 capsule 5     [DISCONTINUED] metoprolol (TOPROL-XL) 50 MG 24 hr tablet Take 1 tablet (50 mg) by mouth daily 90 tablet 0     [DISCONTINUED] LORazepam (ATIVAN) 0.5 MG tablet Take 1 tablet by mouth every 8 hours as needed for anxiety. 30 tablets to last 30 days. 30 tablet 5     [DISCONTINUED] albuterol (ALBUTEROL) 108 (90 BASE) MCG/ACT inhaler Inhale 2 puffs into the lungs every 4 hours as needed 1 Inhaler 11     Allergies   Allergen Reactions     Sulfa Drugs Other (See Comments)     Never taken medication however \"was told to put it down just in case\"     BP Readings from Last 3 Encounters:   01/16/17 98/68   01/08/17 125/78   06/30/16 108/75    Wt Readings from Last 3 Encounters:   01/16/17 120 lb (54.432 kg)   01/08/17 118 lb 4.8 oz (53.661 kg)   06/30/16 112 lb (50.803 kg)          " "        Labs reviewed in EPIC  Problem list, Medication list, Allergies, and Medical/Social/Surgical histories reviewed in [facility] and updated as appropriate.    ROS:  Constitutional, HEENT, cardiovascular, pulmonary, gi and gu systems are negative, except as otherwise noted.    OBJECTIVE:                                                    BP 98/68 mmHg  Pulse 127  Temp(Src) 98  F (36.7  C) (Oral)  Ht 5' 2\" (1.575 m)  Wt 120 lb (54.432 kg)  BMI 21.94 kg/m2  SpO2 95%  LMP 12/20/2016  Body mass index is 21.94 kg/(m^2).  Constitutional: healthy, alert and no distress  Cardiovascular: RRR. No murmurs, clicks gallops or rub  Respiratory: Respirations easy and regular. No respiratory distress noted. Lung sounds clear to auscultation.  Neurologic: Gait normal. Reflexes normal and symmetric. Sensation grossly WNL.  Psychiatric: mentation appears normal and affect normal/bright       ASSESSMENT/PLAN:                                                    (M79.7) Fibromyalgia  (primary encounter diagnosis)  Comment:   Plan: LORazepam (ATIVAN) 0.5 MG tablet,         cyclobenzaprine (FLEXERIL) 10 MG tablet        Refills given.  I did review the [place] refill policy with ativan.  See below    (Z86.79) H/O sinus tachycardia  Comment:   Plan: metoprolol (TOPROL-XL) 50 MG 24 hr tablet        Refills given    (G44.219) Episodic tension-type headache, not intractable  Comment:   Plan: gabapentin (NEURONTIN) 300 MG capsule        Refills given.     (J42) obliterative bronchiolitis  Comment:   Plan: fluticasone (FLONASE) 50 MCG/ACT spray,         albuterol (ALBUTEROL) 108 (90 BASE) MCG/ACT         Inhaler        Refills given.    (D69.6) Temporary low platelet count (H)  Comment:   Plan: CBC with platelets and differential, ONC/HEME         ADULT REFERRAL        See below.    Patient Instructions   Continue your medications as prescribed.  Use your lorazepam sparingly.    Your next Clinic appointment with me eejuana 6 months, sooner if " problems.   At that appointment we will do a full physical with pap.     Platelets: your platelets have been low.  I would like you to follow up with hematology for a consultation.    Mammogram:  At your earliest convenience.        Total: 30 min spent with the patient, 50% time spent counseling regarding medications, refills, and follow up, coordination of care.    ARDEN Coelho [facility]

## 2017-01-16 NOTE — PATIENT INSTRUCTIONS
Continue your medications as prescribed.  Use your lorazepam sparingly.    Your next Clinic appointment with me eery 6 months, sooner if problems.   At that appointment we will do a full physical with pap.     Platelets: your platelets have been low.  I would like you to follow up with hematology for a consultation.    Mammogram:  At your earliest convenience.

## 2017-01-17 ASSESSMENT — ANXIETY QUESTIONNAIRES: GAD7 TOTAL SCORE: 6

## 2017-01-17 NOTE — PROGRESS NOTES
Quick Note:    Mercy Khanna,    This is to let you know that the results of your recent blood tests are all normal. Your white count and platelets are now okay. Since you have had the history of low platelets, you still have the option of following up with the hematologist as we had discussed. Otherwise, I can recheck your platelets/CBC at your next appointment with me in 6 months.    Neda HER CNP    ______

## 2017-02-08 ENCOUNTER — OFFICE VISIT (OUTPATIENT)
Dept: URGENT CARE | Facility: URGENT CARE | Age: 42
End: 2017-02-08
Payer: MEDICAID

## 2017-02-08 VITALS
RESPIRATION RATE: 14 BRPM | TEMPERATURE: 98 F | HEIGHT: 62 IN | SYSTOLIC BLOOD PRESSURE: 110 MMHG | OXYGEN SATURATION: 97 % | WEIGHT: 120 LBS | DIASTOLIC BLOOD PRESSURE: 60 MMHG | BODY MASS INDEX: 22.08 KG/M2 | HEART RATE: 69 BPM
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
## 2017-02-08 PROCEDURE — 99213 OFFICE O/P EST LOW 20 MIN: CPT | Performed by: INTERNAL MEDICINE

## 2017-02-08 NOTE — PATIENT INSTRUCTIONS
Could use HC or antibiotics ointment    Call or return to clinic if symptoms worsen or fail to improve as anticipated.

## 2017-02-08 NOTE — PROGRESS NOTES
"SUBJECTIVE:  Clemencia Rosales, a 41 year old female scheduled an appointment to discuss the following issues:  Chief Complaint   Patient presents with     Urgent Care     Derm Problem     Bite on right leg 2 days ago,      Noticed in shower.  Took pix    Thinks had a bite  scabbed    Unclear when happened  Did not feel it  Has sat on floors  Animals in house        Current Outpatient Prescriptions on File Prior to Visit:  LORazepam (ATIVAN) 0.5 MG tablet Take 1 tablet by mouth every 8 hours as needed for anxiety. 30 tablets to last 30 days.   metoprolol (TOPROL-XL) 50 MG 24 hr tablet Take 1 tablet (50 mg) by mouth daily   levothyroxine (SYNTHROID) 112 MCG tablet Take  by mouth daily.   gabapentin (NEURONTIN) 300 MG capsule Take 1 capsule (300 mg) by mouth 3 times daily   cyclobenzaprine (FLEXERIL) 10 MG tablet Take 0.5-1 tablets (5-10 mg) by mouth 3 times daily as needed for muscle spasms   fluticasone (FLONASE) 50 MCG/ACT spray Spray 2 sprays into both nostrils daily   albuterol (ALBUTEROL) 108 (90 BASE) MCG/ACT Inhaler Inhale 1-2 puffs into the lungs every 4 hours as needed   hydrocortisone 2.5 % cream    phenylephrine (CASTRO-SYNEPHRINE) 0.25 % nasal spray Spray 1 spray into both nostrils every 4 hours as needed for congestion   Cholecalciferol (VITAMIN D PO)    aspirin 325 MG tablet Take  by mouth daily. Takes 2 tablets as needed   BENADRYL OR AS NEEDED     No current facility-administered medications on file prior to visit.      Medical, social, surgical, and family histories reviewed and updated as of 2/8/2017.    OBJECTIVE:  /60 mmHg  Pulse 69  Temp(Src) 98  F (36.7  C) (Oral)  Resp 14  Ht 5' 2\" (1.575 m)  Wt 120 lb (54.432 kg)  BMI 21.94 kg/m2  SpO2 97%  LMP 01/18/2017  Breastfeeding? No  EXAM:  GENERAL APPEARANCE: healthy, alert and no distress  SKIN: right calf - 33 mm nontender dark scab with out surrounding redness    ASSESSMENT/PLAN:    ASSESSMENT/PLAN:      ICD-10-CM    1. Insect bite, " initial encounter W57.XXXA        Patient Instructions   Could use HC or antibiotics ointment    Call or return to clinic if symptoms worsen or fail to improve as anticipated.            Dilcia Quarles MD

## 2017-02-08 NOTE — NURSING NOTE
"Chief Complaint   Patient presents with     Urgent Care     Derm Problem     Bite on right leg 2 days ago,        Initial /60 mmHg  Pulse 69  Temp(Src) 98  F (36.7  C) (Oral)  Resp 14  Ht 5' 2\" (1.575 m)  Wt 120 lb (54.432 kg)  BMI 21.94 kg/m2  SpO2 97%  LMP 01/18/2017  Breastfeeding? No Estimated body mass index is 21.94 kg/(m^2) as calculated from the following:    Height as of this encounter: 5' 2\" (1.575 m).    Weight as of this encounter: 120 lb (54.432 kg).  Medication Reconciliation: complete  "

## 2017-02-08 NOTE — MR AVS SNAPSHOT
After Visit Summary   2/8/2017    Clemencia Rosales    MRN: [id number]           Patient Information     Date Of Birth          1975        Visit Information        Provider Department      2/8/2017 5:00 PM Dilcia Quarles MD [facility]        Today's Diagnoses     Insect bite, initial encounter    -  1       Care Instructions    Could use HC or antibiotics ointment    Call or return to clinic if symptoms worsen or fail to improve as anticipated.          Follow-ups after your visit        Who to contact     If you have questions or need follow up information about today's clinic visit or your schedule please contact [facility] directly at [phone].  Normal or non-critical lab and imaging results will be communicated to you by MyChart, letter or phone within 4 business days after the clinic has received the results. If you do not hear from us within 7 days, please contact the clinic through [employer] or phone. If you have a critical or abnormal lab result, we will notify you by phone as soon as possible.  Submit refill requests through [employer] or call your pharmacy and they will forward the refill request to us. Please allow 3 business days for your refill to be completed.          Additional Information About Your Visit        MyChart Information     [employer] gives you secure access to your electronic health record. If you see a primary care provider, you can also send messages to your care team and make appointments. If you have questions, please call your primary care clinic.  If you do not have a primary care provider, please call [phone] and they will assist you.        Care EveryWhere ID     This is your Care EveryWhere ID. This could be used by other organizations to access your [place] medical records  [id number]        Your Vitals Were     Pulse Temperature Respirations    69 98  F (36.7  C) (Oral) 14    Height BMI (Body  "Mass Index) Pulse Oximetry    5' 2\" (1.575 m) 21.94 kg/m2 97%    Last Period Breastfeeding?       01/18/2017 No        Blood Pressure from Last 3 Encounters:   02/08/17 110/60   01/16/17 98/68   01/08/17 125/78    Weight from Last 3 Encounters:   02/08/17 120 lb (54.432 kg)   01/16/17 120 lb (54.432 kg)   01/08/17 118 lb 4.8 oz (53.661 kg)              Today, you had the following     No orders found for display       Primary Care Provider Office Phone # Fax #    Neda VÁSQUEZ ARDEN [facility] [phone]       [facility] [address] [place] [address]        Thank you!     Thank you for choosing [facility]  for your care. Our goal is always to provide you with excellent care. Hearing back from our patients is one way we can continue to improve our services. Please take a few minutes to complete the written survey that you may receive in the mail after your visit with us. Thank you!             Your Updated Medication List - Protect others around you: Learn how to safely use, store and throw away your medicines at www.disposemymeds.org.          This list is accurate as of: 2/8/17  5:24 PM.  Always use your most recent med list.                   Brand Name Dispense Instructions for use    albuterol 108 (90 BASE) MCG/ACT Inhaler    albuterol    1 Inhaler    Inhale 1-2 puffs into the lungs every 4 hours as needed       aspirin 325 MG tablet      Take  by mouth daily. Takes 2 tablets as needed       BENADRYL PO      AS NEEDED       cyclobenzaprine 10 MG tablet    FLEXERIL    30 tablet    Take 0.5-1 tablets (5-10 mg) by mouth 3 times daily as needed for muscle spasms       fluticasone 50 MCG/ACT spray    FLONASE    16 g    Spray 2 sprays into both nostrils daily       gabapentin 300 MG capsule    NEURONTIN    90 capsule    Take 1 capsule (300 mg) by mouth 3 times daily       hydrocortisone 2.5 % cream          LORazepam 0.5 MG tablet    ATIVAN    30 " tablet    Take 1 tablet by mouth every 8 hours as needed for anxiety. 30 tablets to last 30 days.       metoprolol 50 MG 24 hr tablet    TOPROL-XL    90 tablet    Take 1 tablet (50 mg) by mouth daily       phenylephrine 0.25 % spray    CASTRO-SYNEPHRINE     [place] 1 spray into both nostrils every 4 hours as needed for congestion       SYNTHROID 112 MCG tablet   Generic drug:  levothyroxine      Take  by mouth daily.       VITAMIN D PO

## 2017-02-15 ENCOUNTER — OFFICE VISIT (OUTPATIENT)
Dept: FAMILY MEDICINE | Facility: CLINIC | Age: 42
End: 2017-02-15
Payer: MEDICAID

## 2017-02-15 VITALS
WEIGHT: 122 LBS | DIASTOLIC BLOOD PRESSURE: 80 MMHG | BODY MASS INDEX: 22.31 KG/M2 | HEART RATE: 122 BPM | TEMPERATURE: 97 F | OXYGEN SATURATION: 97 % | SYSTOLIC BLOOD PRESSURE: 115 MMHG
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
## 2017-02-15 PROCEDURE — 99212 OFFICE O/P EST SF 10 MIN: CPT | Performed by: NURSE PRACTITIONER

## 2017-02-15 NOTE — NURSING NOTE
"Chief Complaint   Patient presents with     Insect Bites       Initial /80 (BP Location: Left arm, Patient Position: Chair, Cuff Size: Adult Regular)  Pulse 122  Temp 97  F (36.1  C) (Tympanic)  Wt 122 lb (55.3 kg)  LMP 01/18/2017  SpO2 97%  BMI 22.31 kg/m2 Estimated body mass index is 22.31 kg/(m^2) as calculated from the following:    Height as of 2/8/17: 5' 2\" (1.575 m).    Weight as of this encounter: 122 lb (55.3 kg).  Medication Reconciliation: complete     Xuan Joseph MA     "

## 2017-02-15 NOTE — MR AVS SNAPSHOT
After Visit Summary   2/15/2017    Clemencia Rosales    MRN: [id number]           Patient Information     Date Of Birth          1975        Visit Information        Provider Department      2/15/2017 2:40 PM Neda Damon APRN CNP [facility]        Today's Diagnoses     Insect bite, subsequent encounter    -  1       Follow-ups after your visit        Who to contact     If you have questions or need follow up information about today's clinic visit or your schedule please contact [facility] directly at [phone].  Normal or non-critical lab and imaging results will be communicated to you by e|tabhart, letter or phone within 4 business days after the clinic has received the results. If you do not hear from us within 7 days, please contact the clinic through e|tabhart or phone. If you have a critical or abnormal lab result, we will notify you by phone as soon as possible.  Submit refill requests through [employer] or call your pharmacy and they will forward the refill request to us. Please allow 3 business days for your refill to be completed.          Additional Information About Your Visit        MyChart Information     [employer] gives you secure access to your electronic health record. If you see a primary care provider, you can also send messages to your care team and make appointments. If you have questions, please call your primary care clinic.  If you do not have a primary care provider, please call [phone] and they will assist you.        Care EveryWhere ID     This is your Care EveryWhere ID. This could be used by other organizations to access your [place] medical records  [id number]        Your Vitals Were     Pulse Temperature Last Period Pulse Oximetry BMI (Body Mass Index)       122 97  F (36.1  C) (Tympanic) 01/18/2017 97% 22.31 kg/m2        Blood Pressure from Last 3 Encounters:   02/15/17 115/80   02/08/17 110/60   01/16/17 98/68     Weight from Last 3 Encounters:   02/15/17 122 lb (55.3 kg)   02/08/17 120 lb (54.4 kg)   01/16/17 120 lb (54.4 kg)              Today, you had the following     No orders found for display       Primary Care Provider Office Phone # Fax ARDEN Ojeda [phone]       [facility] [address] [place] [address]        Thank you!     Thank you for choosing [facility]  for your care. Our goal is always to provide you with excellent care. Hearing back from our patients is one way we can continue to improve our services. Please take a few minutes to complete the written survey that you may receive in the mail after your visit with us. Thank you!             Your Updated Medication List - Protect others around you: Learn how to safely use, store and throw away your medicines at www.disposemymeds.org.          This list is accurate as of: 2/15/17  3:39 PM.  Always use your most recent med list.                   Brand Name Dispense Instructions for use    albuterol 108 (90 BASE) MCG/ACT Inhaler    albuterol    1 Inhaler    Inhale 1-2 puffs into the lungs every 4 hours as needed       aspirin 325 MG tablet      Take  by mouth daily. Takes 2 tablets as needed       BENADRYL PO      AS NEEDED       cyclobenzaprine 10 MG tablet    FLEXERIL    30 tablet    Take 0.5-1 tablets (5-10 mg) by mouth 3 times daily as needed for muscle spasms       fluticasone 50 MCG/ACT spray    FLONASE    16 g    Spray 2 sprays into both nostrils daily       gabapentin 300 MG capsule    NEURONTIN    90 capsule    Take 1 capsule (300 mg) by mouth 3 times daily       hydrocortisone 2.5 % cream          LORazepam 0.5 MG tablet    ATIVAN    30 tablet    Take 1 tablet by mouth every 8 hours as needed for anxiety. 30 tablets to last 30 days.       metoprolol 50 MG 24 hr tablet    TOPROL-XL    90 tablet    Take 1 tablet (50 mg) by mouth daily       phenylephrine 0.25 %  spray    CASTRO-SYNEPHRINE     Volin 1 spray into both nostrils every 4 hours as needed for congestion       SYNTHROID 112 MCG tablet   Generic drug:  levothyroxine      Take  by mouth daily.       VITAMIN D PO

## 2017-02-15 NOTE — PROGRESS NOTES
"  SUBJECTIVE:                                                    Clemencia Rosales is a 41 year old female who presents to clinic today for the following health issues:  Chief Complaint   Patient presents with     Insect Bites       Bug Bite       Duration: couple weeks     Description (location/character/radiation): Clemencia is having problems with a \"bit\" on her right lower leg. Initially it looked very dark,almost scabby with dry skin around it. As time goes along, the bump is looking better now and is now light brown, not black. No other bites or wounds. No itching, pain, fever, etc.    Intensity:  mild    Accompanying signs and symptoms: none    History (similar episodes/previous evaluation): None    Precipitating or alleviating factors: None    Therapies tried and outcome: None         Problem list and histories reviewed & adjusted, as indicated.  Additional history: as documented    Patient Active Problem List   Diagnosis     Thyrotoxicosis     Other congenital anomaly of lung     Fibromyalgia     CARDIOVASCULAR SCREENING; LDL GOAL LESS THAN 160     Seasonal allergic rhinitis     Hypothyroidism     RLQ abdominal pain     Polyarthralgia     obliterative bronchiolitis     H/O sinus tachycardia     Temporary low platelet count (H)     Head pain     Pain of right upper extremity     Controlled substance agreement signed     Past Surgical History   Procedure Laterality Date     Thoracoscopic biopsy rib       x4       Social History   Substance Use Topics     Smoking status: Never Smoker     Smokeless tobacco: Never Used     Alcohol use No     Family History   Problem Relation Age of Onset     DIABETES Paternal Aunt      DIABETES Paternal Uncle      Asthma Other      Asthma Other      Asthma Paternal Uncle      Thyroid Disease No family hx of      female family members has have thyroid issues         Current Outpatient Prescriptions   Medication Sig Dispense Refill     LORazepam (ATIVAN) 0.5 MG tablet Take 1 tablet " "by mouth every 8 hours as needed for anxiety. 30 tablets to last 30 days. 30 tablet 5     metoprolol (TOPROL-XL) 50 MG 24 hr tablet Take 1 tablet (50 mg) by mouth daily 90 tablet 3     gabapentin (NEURONTIN) 300 MG capsule Take 1 capsule (300 mg) by mouth 3 times daily 90 capsule 11     cyclobenzaprine (FLEXERIL) 10 MG tablet Take 0.5-1 tablets (5-10 mg) by mouth 3 times daily as needed for muscle spasms 30 tablet 5     fluticasone (FLONASE) 50 MCG/ACT spray Spray 2 sprays into both nostrils daily 16 g 11     albuterol (ALBUTEROL) 108 (90 BASE) MCG/ACT Inhaler Inhale 1-2 puffs into the lungs every 4 hours as needed 1 Inhaler 11     hydrocortisone 2.5 % cream   2     phenylephrine (CASTRO-SYNEPHRINE) 0.25 % nasal spray Spray 1 spray into both nostrils every 4 hours as needed for congestion       Cholecalciferol (VITAMIN D PO)        levothyroxine (SYNTHROID) 112 MCG tablet Take  by mouth daily.       aspirin 325 MG tablet Take  by mouth daily. Takes 2 tablets as needed       BENADRYL OR AS NEEDED       Allergies   Allergen Reactions     Sulfa Drugs Other (See Comments)     Never taken medication however \"was told to put it down just in case\"     Recent Labs   Lab Test  01/08/17   0913  09/20/16   1306  06/30/16   1345   06/12/14   0222   01/04/12   1109   LDL   --    --    --    --    --    --   133*   HDL   --    --    --    --    --    --   52   TRIG   --    --    --    --    --    --   78   ALT  22   --   18   --   27   < >  16   CR  1.06*   --   0.95   < >  0.91   < >  0.91   GFRESTIMATED  57*   --   65   < >  70   < >  70   GFRESTBLACK  69   --   79   < >  84   < >  85   POTASSIUM  4.1   --   4.0   < >  3.7   < >  4.4   TSH  1.34  0.47   --    < >  2.06   < >   --     < > = values in this interval not displayed.      BP Readings from Last 3 Encounters:   02/15/17 115/80   02/08/17 110/60   01/16/17 98/68    Wt Readings from Last 3 Encounters:   02/15/17 122 lb (55.3 kg)   02/08/17 120 lb (54.4 kg)   01/16/17 120 lb " (54.4 kg)              Labs reviewed in EPIC  Problem list, Medication list, Allergies, and Medical/Social/Surgical histories reviewed in [facility] and updated as appropriate.    ROS:  Constitutional, HEENT, cardiovascular, pulmonary, gi and gu systems are negative, except as otherwise noted.    OBJECTIVE:                                                    /80 (BP Location: Left arm, Patient Position: Chair, Cuff Size: Adult Regular)  Pulse 122  Temp 97  F (36.1  C) (Tympanic)  Wt 122 lb (55.3 kg)  LMP 01/18/2017  SpO2 97%  BMI 22.31 kg/m2  Body mass index is 22.31 kg/(m^2).  GENERAL APPEARANCE: healthy, alert and no distress. Smiling.   SKIN: warm and dry. One small pinkish scabbed papule to right anterior mid lower shin consistent with a bite  PSYCH: mentation appears normal and affect normal/bright.  Good eye contact.       ASSESSMENT/PLAN:                                                    (W57.XXXD) Insect bite, subsequent encounter  (primary encounter diagnosis)  Comment:   Plan:   She was reassured today.  No localized s/s of infection.  She will anticipate resolution and will recheck if any problems.      Total: 15 min spent with the patient, 100% face to face counseling, assessment, reassuring, discussing follow up, etc.    ARDEN Coelho [facility]

## 2017-05-11 ENCOUNTER — TELEPHONE (OUTPATIENT)
Dept: FAMILY MEDICINE | Facility: CLINIC | Age: 42
End: 2017-05-11

## 2017-05-11 NOTE — TELEPHONE ENCOUNTER
5/11/2017    Call Regarding Preventive Health Screening Mammogram and Cervical/PAP    Attempt 1    Message on voicemail     Comments:       Outreach   CC

## 2017-05-23 ENCOUNTER — MEDICAL CORRESPONDENCE (OUTPATIENT)
Dept: HEALTH INFORMATION MANAGEMENT | Facility: CLINIC | Age: 42
End: 2017-05-23

## 2017-05-23 DIAGNOSIS — E03.9 HYPOTHYROIDISM, UNSPECIFIED TYPE: Primary | ICD-10-CM

## 2017-05-24 DIAGNOSIS — E03.9 HYPOTHYROIDISM, UNSPECIFIED TYPE: ICD-10-CM

## 2017-05-24 LAB
DEPRECATED CALCIDIOL+CALCIFEROL SERPL-MC: 81 UG/L (ref 20–75)
T3 SERPL-MCNC: 76 NG/DL (ref 60–181)

## 2017-05-24 PROCEDURE — 84480 ASSAY TRIIODOTHYRONINE (T3): CPT | Performed by: INTERNAL MEDICINE

## 2017-05-24 PROCEDURE — 84443 ASSAY THYROID STIM HORMONE: CPT | Performed by: INTERNAL MEDICINE

## 2017-05-24 PROCEDURE — 82310 ASSAY OF CALCIUM: CPT | Performed by: INTERNAL MEDICINE

## 2017-05-24 PROCEDURE — 36415 COLL VENOUS BLD VENIPUNCTURE: CPT | Performed by: INTERNAL MEDICINE

## 2017-05-24 PROCEDURE — 84439 ASSAY OF FREE THYROXINE: CPT | Performed by: INTERNAL MEDICINE

## 2017-05-24 PROCEDURE — 82306 VITAMIN D 25 HYDROXY: CPT | Performed by: INTERNAL MEDICINE

## 2017-05-25 LAB
CALCIUM SERPL-MCNC: 8.5 MG/DL (ref 8.5–10.1)
T4 FREE SERPL-MCNC: 1.26 NG/DL (ref 0.76–1.46)
TSH SERPL DL<=0.05 MIU/L-ACNC: 0.55 MU/L (ref 0.4–4)

## 2017-05-27 ENCOUNTER — HEALTH MAINTENANCE LETTER (OUTPATIENT)
Age: 42
End: 2017-05-27

## 2017-05-31 ENCOUNTER — TRANSFERRED RECORDS (OUTPATIENT)
Dept: HEALTH INFORMATION MANAGEMENT | Facility: CLINIC | Age: 42
End: 2017-05-31

## 2017-07-13 NOTE — TELEPHONE ENCOUNTER
7/13/2017    Call Regarding Preventive Health Screening Mammogram and Cervical/PAP    Attempt 2    Message     Comments: patient will be out of town and will call back when she is ready      Outreach   Arielle Reed

## 2017-08-08 ENCOUNTER — MYC REFILL (OUTPATIENT)
Dept: FAMILY MEDICINE | Facility: CLINIC | Age: 42
End: 2017-08-08

## 2017-08-08 DIAGNOSIS — M79.7 FIBROMYALGIA: ICD-10-CM

## 2017-08-08 RX ORDER — LORAZEPAM 0.5 MG/1
TABLET ORAL
Qty: 30 TABLET | Refills: 5 | Status: CANCELLED | OUTPATIENT
Start: 2017-08-08

## 2017-08-08 NOTE — TELEPHONE ENCOUNTER
Message from MyChart:  Original authorizing provider: ARDEN Coelho CNP would like a refill of the following medications:  LORazepam (ATIVAN) 0.5 MG tablet [ARDEN Coelho CNP]    Preferred pharmacy: [address] [address]    Comment:

## 2017-08-09 DIAGNOSIS — M79.7 FIBROMYALGIA: ICD-10-CM

## 2017-08-09 NOTE — TELEPHONE ENCOUNTER
Shea Mcgregor CNP for Neda Damon CNP  But pt has appointment with you 8/4    Pt states she is currently all out and is wondering if she can get an emergency supply to last her until her appointment on Monday. Please assist, ASAP thanks    Thanks!     Osiris Goldman RN  .

## 2017-08-09 NOTE — TELEPHONE ENCOUNTER
Reason for Call:  Medication or medication refill:    Do you use a [facility]?  Name of the pharmacy and phone number for the current request:  [facility]    Name of the medication requested: Lorazepam    Other request: Please see [employer] Message from yesterday 08/08. Pt called in to request a refill for her lorazepam. She is aware that CO is out of the office this week and wont be available until the week of 8/21. From [employer] message, advised that pt needs to schedule an appointment. Appointment is made for Monday 8/14 with SB. Pt states she is currently all out and is wondering if she can get an emergency supply to last her until her appointment on Monday. Please assist, ASAP thanks.    Can we leave a detailed message on this number? YES    Phone number patient can be reached at: Home number on file [phone] (home)    Best Time: anytime    Call taken on 8/9/2017 at 4:41 PM by Arielle Almeida

## 2017-08-11 RX ORDER — LORAZEPAM 0.5 MG/1
TABLET ORAL
Qty: 7 TABLET | Refills: 0 | Status: SHIPPED | OUTPATIENT
Start: 2017-08-11 | End: 2017-10-10

## 2017-08-14 ENCOUNTER — OFFICE VISIT (OUTPATIENT)
Dept: FAMILY MEDICINE | Facility: CLINIC | Age: 42
End: 2017-08-14
Payer: MEDICARE

## 2017-08-14 VITALS
BODY MASS INDEX: 22.34 KG/M2 | OXYGEN SATURATION: 98 % | RESPIRATION RATE: 16 BRPM | DIASTOLIC BLOOD PRESSURE: 91 MMHG | SYSTOLIC BLOOD PRESSURE: 125 MMHG | TEMPERATURE: 98.2 F | WEIGHT: 121.38 LBS | HEART RATE: 102 BPM | HEIGHT: 62 IN

## 2017-08-14 DIAGNOSIS — M79.7 FIBROMYALGIA: ICD-10-CM

## 2017-08-14 DIAGNOSIS — F41.1 GAD (GENERALIZED ANXIETY DISORDER): Primary | ICD-10-CM

## 2017-08-14 PROCEDURE — 99213 OFFICE O/P EST LOW 20 MIN: CPT | Performed by: NURSE PRACTITIONER

## 2017-08-14 RX ORDER — LORAZEPAM 0.5 MG/1
TABLET ORAL
Qty: 30 TABLET | Refills: 5 | Status: SHIPPED | OUTPATIENT
Start: 2017-08-14 | End: 2018-03-16

## 2017-08-14 ASSESSMENT — ANXIETY QUESTIONNAIRES
5. BEING SO RESTLESS THAT IT IS HARD TO SIT STILL: NOT AT ALL
IF YOU CHECKED OFF ANY PROBLEMS ON THIS QUESTIONNAIRE, HOW DIFFICULT HAVE THESE PROBLEMS MADE IT FOR YOU TO DO YOUR WORK, TAKE CARE OF THINGS AT HOME, OR GET ALONG WITH OTHER PEOPLE: NOT DIFFICULT AT ALL
7. FEELING AFRAID AS IF SOMETHING AWFUL MIGHT HAPPEN: NOT AT ALL
GAD7 TOTAL SCORE: 1
3. WORRYING TOO MUCH ABOUT DIFFERENT THINGS: NOT AT ALL
2. NOT BEING ABLE TO STOP OR CONTROL WORRYING: NOT AT ALL
6. BECOMING EASILY ANNOYED OR IRRITABLE: NOT AT ALL
1. FEELING NERVOUS, ANXIOUS, OR ON EDGE: NOT AT ALL

## 2017-08-14 ASSESSMENT — PATIENT HEALTH QUESTIONNAIRE - PHQ9: 5. POOR APPETITE OR OVEREATING: SEVERAL DAYS

## 2017-08-14 NOTE — MR AVS SNAPSHOT
"              After Visit Summary   8/14/2017    Clemencia Rosales    MRN: [id number]           Patient Information     Date Of Birth          1975        Visit Information        Provider Department      8/14/2017 2:40 PM Shea Mcgregor APRN CNP [facility]        Today's Diagnoses     DANIELLE (generalized anxiety disorder)    -  1    Fibromyalgia           Follow-ups after your visit        Who to contact     If you have questions or need follow up information about today's clinic visit or your schedule please contact [facility] directly at [phone].  Normal or non-critical lab and imaging results will be communicated to you by I Do Venueshart, letter or phone within 4 business days after the clinic has received the results. If you do not hear from us within 7 days, please contact the clinic through [employer] or phone. If you have a critical or abnormal lab result, we will notify you by phone as soon as possible.  Submit refill requests through [employer] or call your pharmacy and they will forward the refill request to us. Please allow 3 business days for your refill to be completed.          Additional Information About Your Visit        MyChart Information     [employer] gives you secure access to your electronic health record. If you see a primary care provider, you can also send messages to your care team and make appointments. If you have questions, please call your primary care clinic.  If you do not have a primary care provider, please call [phone] and they will assist you.        Care EveryWhere ID     This is your Care EveryWhere ID. This could be used by other organizations to access your [place] medical records  [id number]        Your Vitals Were     Pulse Temperature Respirations Height Last Period Pulse Oximetry    102 98.2  F (36.8  C) (Oral) 16 5' 2\" (1.575 m) 08/11/2017 (Exact Date) 98%    Breastfeeding? BMI (Body Mass Index)                No 22.2 kg/m2  "          Blood Pressure from Last 3 Encounters:   08/14/17 (!) 125/91   02/15/17 115/80   02/08/17 110/60    Weight from Last 3 Encounters:   08/14/17 121 lb 6 oz (55.1 kg)   02/15/17 122 lb (55.3 kg)   02/08/17 120 lb (54.4 kg)              Today, you had the following     No orders found for display         Today's Medication Changes          These changes are accurate as of: 8/14/17 11:59 PM.  If you have any questions, ask your nurse or doctor.               These medicines have changed or have updated prescriptions.        Dose/Directions    * LORazepam 0.5 MG tablet   Commonly known as:  ATIVAN   This may have changed:  Another medication with the same name was added. Make sure you understand how and when to take each.   Used for:  Fibromyalgia   Changed by:  Neda Damon APRN CNP        Take 1 tablet by mouth every 8 hours as needed for anxiety. 30 tablets to last 30 days.   Quantity:  7 tablet   Refills:  0       * LORazepam 0.5 MG tablet   Commonly known as:  ATIVAN   This may have changed:  You were already taking a medication with the same name, and this prescription was added. Make sure you understand how and when to take each.   Used for:  DANIELLE (generalized anxiety disorder)   Changed by:  Shea Mcgregor APRN CNP        Take 1 tablet by mouth every 8 hours as needed for anxiety. 30 tablets to last 30 days.   Quantity:  30 tablet   Refills:  5       * Notice:  This list has 2 medication(s) that are the same as other medications prescribed for you. Read the directions carefully, and ask your doctor or other care provider to review them with you.         Where to get your medicines      Some of these will need a paper prescription and others can be bought over the counter.  Ask your nurse if you have questions.     Bring a paper prescription for each of these medications     LORazepam 0.5 MG tablet                Primary Care Provider Office Phone # Fax #    ARDEN Patterson  [phone]       [address] [place] [address]        Equal Access to Services     PAULO REZA : Hadii aad ku hadmilton Steele, waannida ludestiny, lorita kagigida bernardo, ralph will laalexyuliet deirdre. So [facility] [phone].    ATENCIÓN: Si habla español, tiene a dowd disposición servicios gratuitos de asistencia lingüística. Llame al [phone].    We comply with applicable federal civil rights laws and [place] laws. We do not discriminate on the basis of race, color, national origin, age, disability sex, sexual orientation or gender identity.            Thank you!     Thank you for choosing [facility]  for your care. Our goal is always to provide you with excellent care. Hearing back from our patients is one way we can continue to improve our services. Please take a few minutes to complete the written survey that you may receive in the mail after your visit with us. Thank you!             Your Updated Medication List - Protect others around you: Learn how to safely use, store and throw away your medicines at www.disposemymeds.org.          This list is accurate as of: 8/14/17 11:59 PM.  Always use your most recent med list.                   Brand Name Dispense Instructions for use Diagnosis    albuterol 108 (90 BASE) MCG/ACT Inhaler    albuterol    1 Inhaler    Inhale 1-2 puffs into the lungs every 4 hours as needed    Bronchiolitis obliterans (H)       aspirin 325 MG tablet      Take  by mouth daily. Takes 2 tablets as needed    Seasonal allergic rhinitis, Unspecified symptom associated with female genital organs, Thyrotoxicosis without mention of goiter or other cause, without mention of thyrotoxic crisis or storm, Other congenital anomaly of lung       BENADRYL PO      AS NEEDED        cyclobenzaprine 10 MG tablet    FLEXERIL    30 tablet    Take 0.5-1 tablets (5-10 mg) by mouth 3 times daily as needed for muscle spasms    Fibromyalgia        fluticasone 50 MCG/ACT spray    FLONASE    16 g    Spray 2 sprays into both nostrils daily    Bronchiolitis obliterans (H)       gabapentin 300 MG capsule    NEURONTIN    90 capsule    Take 1 capsule (300 mg) by mouth 3 times daily    Episodic tension-type headache, not intractable       hydrocortisone 2.5 % cream           * LORazepam 0.5 MG tablet    ATIVAN    7 tablet    Take 1 tablet by mouth every 8 hours as needed for anxiety. 30 tablets to last 30 days.    Fibromyalgia       * LORazepam 0.5 MG tablet    ATIVAN    30 tablet    Take 1 tablet by mouth every 8 hours as needed for anxiety. 30 tablets to last 30 days.    DANIELLE (generalized anxiety disorder)       metoprolol 50 MG 24 hr tablet    TOPROL-XL    90 tablet    Take 1 tablet (50 mg) by mouth daily    H/O sinus tachycardia       phenylephrine 0.25 % spray    CASTRO-SYNEPHRINE     Harlan 1 spray into both nostrils every 4 hours as needed for congestion        SYNTHROID 112 MCG tablet   Generic drug:  levothyroxine      Take  by mouth daily.        VITAMIN D PO           * Notice:  This list has 2 medication(s) that are the same as other medications prescribed for you. Read the directions carefully, and ask your doctor or other care provider to review them with you.

## 2017-08-14 NOTE — PROGRESS NOTES
"  SUBJECTIVE:                                                    Clemencia Rosales is a 41 year old female who presents to clinic today for the following health issues:    Medication Followup of Lorazepam     Taking Medication as prescribed: yes    Side Effects:  None    Medication Helping Symptoms:  yes     Due for 6 month f/u of ativan.  Not using it daily.  Think 5-6 per week.  No side effects.  Helping control her worry.      Problem list and histories reviewed & adjusted, as indicated.  Additional history: as documented    Reviewed and updated as needed this visit by clinical staffTobacco  Allergies  Meds  Problems  Med Hx  Surg Hx  Fam Hx  Soc Hx        Reviewed and updated as needed this visit by Provider  Tobacco  Allergies  Meds  Problems  Med Hx  Surg Hx  Fam Hx  Soc Hx          ROS:  C: NEGATIVE for fever, chills, change in weight  E/M: NEGATIVE for ear, mouth and throat problems  R: NEGATIVE for significant cough or SOB  CV: NEGATIVE for chest pain, palpitations or peripheral edema  PSYCHIATRIC: anxiety    OBJECTIVE:     BP (!) 125/91 (BP Location: Right arm, Patient Position: Chair, Cuff Size: Adult Regular)  Pulse 102  Temp 98.2  F (36.8  C) (Oral)  Resp 16  Ht 5' 2\" (1.575 m)  Wt 121 lb 6 oz (55.1 kg)  LMP 08/11/2017 (Exact Date)  SpO2 98%  Breastfeeding? No  BMI 22.2 kg/m2  Body mass index is 22.2 kg/(m^2).  GENERAL: healthy, alert and no distress  RESP: lungs clear to auscultation - no rales, rhonchi or wheezes  CV: regular rate and rhythm, normal S1 S2, no S3 or S4, no murmur, click or rub, no peripheral edema and peripheral pulses strong  MS: no gross musculoskeletal defects noted, no edema  PSYCH: concentration poor and anxious      ASSESSMENT/PLAN:       ICD-10-CM    1. DANIELLE (generalized anxiety disorder) F41.1 LORazepam (ATIVAN) 0.5 MG tablet   2. Fibromyalgia M79.7      Discussed not working or driving when taking this medication.  Continue counseling.  Increase " exercise/yoga.  F/u with PCP in 6 months or sooner if worsening.    See Patient Instructions    ARDEN Almaguer CNP  [facility]

## 2017-08-14 NOTE — NURSING NOTE
"Chief Complaint   Patient presents with     Recheck Medication     lorazepam       Initial BP (!) 125/91 (BP Location: Right arm, Patient Position: Chair, Cuff Size: Adult Regular)  Pulse 102  Temp 98.2  F (36.8  C) (Oral)  Resp 16  Ht 5' 2\" (1.575 m)  Wt 121 lb 6 oz (55.1 kg)  LMP 08/11/2017 (Exact Date)  SpO2 98%  Breastfeeding? No  BMI 22.2 kg/m2 Estimated body mass index is 22.2 kg/(m^2) as calculated from the following:    Height as of this encounter: 5' 2\" (1.575 m).    Weight as of this encounter: 121 lb 6 oz (55.1 kg).  Medication Reconciliation: complete     Lennie Kuo MA      "

## 2017-08-15 ASSESSMENT — ANXIETY QUESTIONNAIRES: GAD7 TOTAL SCORE: 1

## 2017-10-02 ENCOUNTER — MYC REFILL (OUTPATIENT)
Dept: FAMILY MEDICINE | Facility: CLINIC | Age: 42
End: 2017-10-02

## 2017-10-02 DIAGNOSIS — M79.7 FIBROMYALGIA: ICD-10-CM

## 2017-10-02 RX ORDER — CYCLOBENZAPRINE HCL 10 MG
5-10 TABLET ORAL 3 TIMES DAILY PRN
Qty: 30 TABLET | Refills: 5 | Status: SHIPPED | OUTPATIENT
Start: 2017-10-02 | End: 2018-06-12

## 2017-10-02 NOTE — TELEPHONE ENCOUNTER
Message from [employer]:  Original authorizing provider: ARDEN Coelho CNP would like a refill of the following medications:  cyclobenzaprine (FLEXERIL) 10 MG tablet [ARDEN Coelho CNP]    Preferred pharmacy: [facility] [id number] [place] [address] & [address]    Comment:  am going out of town on Thursday, will this be able to be filled before then? didn't realize I was out of refills... neelam

## 2017-10-02 NOTE — TELEPHONE ENCOUNTER
This medication request was approved.  Please ask neelam to schedule a clinic appointment for a full physical with pap smear at her earliest convenience.  Thank you.

## 2017-10-10 ENCOUNTER — OFFICE VISIT (OUTPATIENT)
Dept: FAMILY MEDICINE | Facility: CLINIC | Age: 42
End: 2017-10-10
Payer: MEDICARE

## 2017-10-10 VITALS
RESPIRATION RATE: 16 BRPM | TEMPERATURE: 98.3 F | HEIGHT: 62 IN | WEIGHT: 123 LBS | DIASTOLIC BLOOD PRESSURE: 86 MMHG | BODY MASS INDEX: 22.63 KG/M2 | SYSTOLIC BLOOD PRESSURE: 118 MMHG | HEART RATE: 124 BPM

## 2017-10-10 DIAGNOSIS — Z23 NEED FOR PROPHYLACTIC VACCINATION AND INOCULATION AGAINST INFLUENZA: ICD-10-CM

## 2017-10-10 DIAGNOSIS — E03.9 HYPOTHYROIDISM, UNSPECIFIED TYPE: Primary | ICD-10-CM

## 2017-10-10 DIAGNOSIS — L30.9 DERMATITIS: ICD-10-CM

## 2017-10-10 PROCEDURE — 36415 COLL VENOUS BLD VENIPUNCTURE: CPT | Performed by: NURSE PRACTITIONER

## 2017-10-10 PROCEDURE — 84443 ASSAY THYROID STIM HORMONE: CPT | Performed by: NURSE PRACTITIONER

## 2017-10-10 PROCEDURE — 99214 OFFICE O/P EST MOD 30 MIN: CPT | Mod: 25 | Performed by: NURSE PRACTITIONER

## 2017-10-10 PROCEDURE — 90686 IIV4 VACC NO PRSV 0.5 ML IM: CPT | Performed by: NURSE PRACTITIONER

## 2017-10-10 PROCEDURE — G0008 ADMIN INFLUENZA VIRUS VAC: HCPCS | Performed by: NURSE PRACTITIONER

## 2017-10-10 RX ORDER — HYDROCORTISONE VALERATE 2 MG/G
OINTMENT TOPICAL
Qty: 45 G | Refills: 0 | Status: SHIPPED | OUTPATIENT
Start: 2017-10-10

## 2017-10-10 NOTE — MR AVS SNAPSHOT
"              After Visit Summary   10/10/2017    Clemencia Rosales    MRN: [id number]           Patient Information     Date Of Birth          1975        Visit Information        Provider Department      10/10/2017 1:40 PM Neda Damon APRN CNP [facility]        Today's Diagnoses     Hypothyroidism, unspecified type    -  1    Dermatitis        Need for prophylactic vaccination and inoculation against influenza           Follow-ups after your visit        Who to contact     If you have questions or need follow up information about today's clinic visit or your schedule please contact [facility] directly at [phone].  Normal or non-critical lab and imaging results will be communicated to you by Naterohart, letter or phone within 4 business days after the clinic has received the results. If you do not hear from us within 7 days, please contact the clinic through Naterohart or phone. If you have a critical or abnormal lab result, we will notify you by phone as soon as possible.  Submit refill requests through Nitero or call your pharmacy and they will forward the refill request to us. Please allow 3 business days for your refill to be completed.          Additional Information About Your Visit        MyChart Information     Nitero gives you secure access to your electronic health record. If you see a primary care provider, you can also send messages to your care team and make appointments. If you have questions, please call your primary care clinic.  If you do not have a primary care provider, please call [phone] and they will assist you.        Care EveryWhere ID     This is your Care EveryWhere ID. This could be used by other organizations to access your [place] medical records  [id number]        Your Vitals Were     Pulse Temperature Respirations Height BMI (Body Mass Index)       124 98.3  F (36.8  C) (Oral) 16 5' 2\" (1.575 m) 22.5 kg/m2        " Blood Pressure from Last 3 Encounters:   10/10/17 118/86   08/14/17 (!) 125/91   02/15/17 115/80    Weight from Last 3 Encounters:   10/10/17 123 lb (55.8 kg)   08/14/17 121 lb 6 oz (55.1 kg)   02/15/17 122 lb (55.3 kg)              We Performed the Following     FLU VAC, SPLIT VIRUS IM > 3 YO (QUADRIVALENT) [51713]     TSH with free T4 reflex     Vaccine Administration, Initial [82641]          Today's Medication Changes          These changes are accurate as of: 10/10/17 11:59 PM.  If you have any questions, ask your nurse or doctor.               Start taking these medicines.        Dose/Directions    hydrocortisone valerate 0.2 % ointment   Commonly known as:  WEST-GISELE   Used for:  Dermatitis   Started by:  Neda Damon APRN CNP        Apply sparingly to affected area three times daily as needed.   Quantity:  45 g   Refills:  0            Where to get your medicines      These medications were sent to [facility] [address] [place] [address] & [facility]  [address] [place] [id number]     Phone:  [phone]     hydrocortisone valerate 0.2 % ointment                Primary Care Provider Office Phone # Fax #    ARDEN Patterson [phone] [address] [place] [address]        Equal Access to Services     PAULO REZA AH: Hadii jose ku hadasho Soomaali, waaxda luqadaha, qaybta kaalmada adeegyada, ralph gilmore. So [facility] [phone].    ATENCIÓN: Si habla español, tiene a dowd disposición servicios gratuitos de asistencia lingüística. Llame al [phone].    We comply with applicable federal civil rights laws and [place] laws. We do not discriminate on the basis of race, color, national origin, age, disability, sex, sexual orientation, or gender identity.            Thank you!     Thank you for choosing [facility]  for your care. Our goal is always to provide  you with excellent care. Hearing back from our patients is one way we can continue to improve our services. Please take a few minutes to complete the written survey that you may receive in the mail after your visit with us. Thank you!             Your Updated Medication List - Protect others around you: Learn how to safely use, store and throw away your medicines at www.disposemymeds.org.          This list is accurate as of: 10/10/17 11:59 PM.  Always use your most recent med list.                   Brand Name Dispense Instructions for use Diagnosis    albuterol 108 (90 BASE) MCG/ACT Inhaler    PROAIR HFA    1 Inhaler    Inhale 1-2 puffs into the lungs every 4 hours as needed    Bronchiolitis obliterans (H)       aspirin 325 MG tablet      Take  by mouth daily. Takes 2 tablets as needed    Seasonal allergic rhinitis, Unspecified symptom associated with female genital organs, Thyrotoxicosis without mention of goiter or other cause, without mention of thyrotoxic crisis or storm, Other congenital anomaly of lung       BENADRYL PO      AS NEEDED        cyclobenzaprine 10 MG tablet    FLEXERIL    30 tablet    Take 0.5-1 tablets (5-10 mg) by mouth 3 times daily as needed for muscle spasms    Fibromyalgia       fluticasone 50 MCG/ACT spray    FLONASE    16 g    Spray 2 sprays into both nostrils daily    Bronchiolitis obliterans (H)       gabapentin 300 MG capsule    NEURONTIN    90 capsule    Take 1 capsule (300 mg) by mouth 3 times daily    Episodic tension-type headache, not intractable       hydrocortisone 2.5 % cream           hydrocortisone valerate 0.2 % ointment    WEST-GISELE    45 g    Apply sparingly to affected area three times daily as needed.    Dermatitis       LORazepam 0.5 MG tablet    ATIVAN    30 tablet    Take 1 tablet by mouth every 8 hours as needed for anxiety. 30 tablets to last 30 days.    DANIELLE (generalized anxiety disorder)       metoprolol 50 MG 24 hr tablet    TOPROL-XL    90 tablet    Take 1  tablet (50 mg) by mouth daily    H/O sinus tachycardia       phenylephrine 0.25 % spray    CASTRO-SYNEPHRINE     [place] 1 spray into both nostrils every 4 hours as needed for congestion        SYNTHROID 112 MCG tablet   Generic drug:  levothyroxine      Take  by mouth daily.        VITAMIN D PO

## 2017-10-10 NOTE — PROGRESS NOTES
Injectable Influenza Immunization Documentation    1.  Is the person to be vaccinated sick today?   No    2. Does the person to be vaccinated have an allergy to a component   of the vaccine?   No    3. Has the person to be vaccinated ever had a serious reaction   to influenza vaccine in the past?   No    4. Has the person to be vaccinated ever had Guillain-Barré syndrome?   No    Form completed by   Gamaliel Heaton MA

## 2017-10-10 NOTE — PROGRESS NOTES
SUBJECTIVE:   Clemencia Rosales is a 41 year old female who presents to clinic today for the following health issues:  Chief Complaint   Patient presents with     Derm Problem     Sick     Thyroid Problem         Rash    Duration: since July 2017    Description  Clemencia has an issue on the inner/sternal area of her right breast that started late July. Within the last 2-3 days a rash developed.  Itching.    [facility]:  Seen by Yolie Tong for facial skin irritation.      Intensity:  moderate    Accompanying signs and symptoms: None    History (similar episodes/previous evaluation): None    Precipitating or alleviating factors:  New exposures:  None  Recent travel: YES- camping over the weekend. Rash began before travel      Therapies tried and outcome: hydrocortisone cream -  Somewhat effective       URI:  Mild symptoms.  Started a few days ago.  Slightly improving with time.      Thyroid level:  Would also like thyroid blood test for a level check.  On medication.      Problem list and histories reviewed & adjusted, as indicated.  Additional history: as documented    Patient Active Problem List   Diagnosis     Thyrotoxicosis     Other congenital anomaly of lung     Fibromyalgia     CARDIOVASCULAR SCREENING; LDL GOAL LESS THAN 160     Seasonal allergic rhinitis     Hypothyroidism     RLQ abdominal pain     Polyarthralgia     obliterative bronchiolitis     H/O sinus tachycardia     Temporary low platelet count (H)     Head pain     Pain of right upper extremity     Controlled substance agreement signed     Past Surgical History:   Procedure Laterality Date     THORACOSCOPIC BIOPSY RIB      x4       Social History   Substance Use Topics     Smoking status: Never Smoker     Smokeless tobacco: Never Used     Alcohol use No     Family History   Problem Relation Age of Onset     DIABETES Paternal Aunt      DIABETES Paternal Uncle      Asthma Other      Asthma Other      Asthma Paternal Uncle      Thyroid  "Disease No family hx of      female family members has have thyroid issues         Current Outpatient Prescriptions   Medication Sig Dispense Refill     cyclobenzaprine (FLEXERIL) 10 MG tablet Take 0.5-1 tablets (5-10 mg) by mouth 3 times daily as needed for muscle spasms 30 tablet 5     LORazepam (ATIVAN) 0.5 MG tablet Take 1 tablet by mouth every 8 hours as needed for anxiety. 30 tablets to last 30 days. 30 tablet 5     LORazepam (ATIVAN) 0.5 MG tablet Take 1 tablet by mouth every 8 hours as needed for anxiety. 30 tablets to last 30 days. 7 tablet 0     metoprolol (TOPROL-XL) 50 MG 24 hr tablet Take 1 tablet (50 mg) by mouth daily 90 tablet 3     gabapentin (NEURONTIN) 300 MG capsule Take 1 capsule (300 mg) by mouth 3 times daily 90 capsule 11     fluticasone (FLONASE) 50 MCG/ACT spray Spray 2 sprays into both nostrils daily 16 g 11     albuterol (ALBUTEROL) 108 (90 BASE) MCG/ACT Inhaler Inhale 1-2 puffs into the lungs every 4 hours as needed 1 Inhaler 11     hydrocortisone 2.5 % cream   2     phenylephrine (CASTRO-SYNEPHRINE) 0.25 % nasal spray Spray 1 spray into both nostrils every 4 hours as needed for congestion       Cholecalciferol (VITAMIN D PO)        levothyroxine (SYNTHROID) 112 MCG tablet Take  by mouth daily.       aspirin 325 MG tablet Take  by mouth daily. Takes 2 tablets as needed       BENADRYL OR AS NEEDED       Allergies   Allergen Reactions     Sulfa Drugs Other (See Comments)     Never taken medication however \"was told to put it down just in case\"     Recent Labs   Lab Test  05/24/17   1458  01/08/17   0913   06/30/16   1345   06/12/14   0222   01/04/12   1109   LDL   --    --    --    --    --    --    --   133*   HDL   --    --    --    --    --    --    --   52   TRIG   --    --    --    --    --    --    --   78   ALT   --   22   --   18   --   27   < >  16   CR   --   1.06*   --   0.95   < >  0.91   < >  0.91   GFRESTIMATED   --   57*   --   65   < >  70   < >  70   GFRESTBLACK   --   69   " "--   79   < >  84   < >  85   POTASSIUM   --   4.1   --   4.0   < >  3.7   < >  4.4   TSH  0.55  1.34   < >   --    < >  2.06   < >   --     < > = values in this interval not displayed.      BP Readings from Last 3 Encounters:   10/10/17 118/86   08/14/17 (!) 125/91   02/15/17 115/80    Wt Readings from Last 3 Encounters:   10/10/17 123 lb (55.8 kg)   08/14/17 121 lb 6 oz (55.1 kg)   02/15/17 122 lb (55.3 kg)                  Labs reviewed in EPIC    Reviewed and updated as needed this visit by clinical staff     Reviewed and updated as needed this visit by Provider         ROS:  Constitutional, HEENT, cardiovascular, pulmonary, GI, , musculoskeletal, neuro, skin, endocrine and psych systems are negative, except as otherwise noted.      OBJECTIVE:   /86  Pulse 124  Temp 98.3  F (36.8  C) (Oral)  Resp 16  Ht 5' 2\" (1.575 m)  Wt 123 lb (55.8 kg)  BMI 22.5 kg/m2  Body mass index is 22.5 kg/(m^2).  GENERAL: healthy, alert and no distress  NECK: no adenopathy, no asymmetry, masses, or scars and thyroid normal to palpation  Ears: bilateral outer ears with dry, scaly skin near the EAC.   RESP: lungs clear to auscultation - no rales, rhonchi or wheezes  CV: regular rate and rhythm, normal S1 S2, no S3 or S4, no murmur, click or rub, no peripheral edema and peripheral pulses strong  ABDOMEN: soft, nontender, no hepatosplenomegaly, no masses and bowel sounds normal  SKIN: there is a dry, erythemetous-based patch on her right breast inner edge along her sternum approximately 2\" long x 1\" wide.  PSYCH: mentation appears normal, affect normal/bright      ASSESSMENT/PLAN:     (E03.9) Hypothyroidism, unspecified type  (primary encounter diagnosis)  Comment: History of  Plan: TSH with free T4 reflex        Routine screening thyroid level done today. She is to continue her current thyroid dosing. If her thyroid levels are abnormal, her dose will be adjusted.  Next Thyroid level due in 1 year, sooner if " concerns.      (L30.9) Dermatitis  Comment: Uncontrolled   Plan: hydrocortisone valerate (WEST-GISELE) 0.2 %         ointment        She is to use the hydrocortisone cream sparingly to the affected areas.   She is to continue over-the-counter lubricating/moisturizing products. I encouraged her to drink more water/fluids.      (Z23) Need for prophylactic vaccination and inoculation against influenza  Comment: Routine   Plan: FLU VAC, SPLIT VIRUS IM > 3 YO (QUADRIVALENT)         [15247], Vaccine Administration, Initial         [25531]        Given    I spent a total of 30 min with the patient, >50% time spent face to face counseling regarding the above issues, establishing a plan of care, and coordinating follow up care.       ARDEN Coelho [facility]

## 2017-10-10 NOTE — NURSING NOTE
"Chief Complaint   Patient presents with     Derm Problem     Sick       Initial /86  Pulse 124  Temp 98.3  F (36.8  C) (Oral)  Resp 16  Ht 5' 2\" (1.575 m)  Wt 123 lb (55.8 kg)  BMI 22.5 kg/m2 Estimated body mass index is 22.5 kg/(m^2) as calculated from the following:    Height as of this encounter: 5' 2\" (1.575 m).    Weight as of this encounter: 123 lb (55.8 kg).  Medication Reconciliation: complete     Gamaliel Heaton MA       "

## 2017-10-11 LAB — TSH SERPL DL<=0.005 MIU/L-ACNC: 0.98 MU/L (ref 0.4–4)

## 2017-10-11 NOTE — PROGRESS NOTES
Mercy Khanna,    This note is to let you know that your recent thyroid level came back normal. Let me know if you have any questions.      Neda HER CNP

## 2017-10-12 ENCOUNTER — TELEPHONE (OUTPATIENT)
Dept: GENERAL RADIOLOGY | Facility: CLINIC | Age: 42
End: 2017-10-12

## 2017-10-12 NOTE — TELEPHONE ENCOUNTER
Rx was denied for below medication/s:    Med Prescribed:  HYDROCORTISONE [place] 0.2%  Reason:  PLAN DOES NOT COVER THIS MED  Recommendations from Insurance:  PA  Insurance Plan:  NOT GIVEN   Patient ID:  [id number]  BIN/RX#:  [id number]  Phone:  [phone]  Fax:       On current med list:  YES    Would you like to change Rx or start PA. If you would like to start PA please include any pertinent information as to why it is needed, other medications taken and reasons why other medication were discontinued.      Please forward to your MA pool.      Thank you,  Nova LANG (R)(M)

## 2017-10-13 NOTE — TELEPHONE ENCOUNTER
Submitted PA to plan via [facility].  Key code: [id number]    The plan will fax you a determination, typically within 1 to 5 business days.  [employer] Rx Non-Formulary Form Prior Authorization for Coverage of Non-Formulary Drugs  Phone: [phone]  Fax: [phone]  Pt's insurance:   Member ID: [id number]  BIN: [id number]  PCN: SEAN Kuo MA

## 2017-10-23 NOTE — TELEPHONE ENCOUNTER
Checked status on CMM. Still no response followed Next Steps and called plan at [phone].  PA handled by different dept. transferred coverage determination    PA was Denied: Will send denial letter.   PA dept. Telephone #: [phone], if questions about PA denial.     Kendal Magallanes MA

## 2018-02-04 ENCOUNTER — MYC REFILL (OUTPATIENT)
Dept: FAMILY MEDICINE | Facility: CLINIC | Age: 43
End: 2018-02-04

## 2018-02-04 DIAGNOSIS — Z86.79 H/O SINUS TACHYCARDIA: ICD-10-CM

## 2018-02-05 RX ORDER — METOPROLOL SUCCINATE 50 MG/1
50 TABLET, EXTENDED RELEASE ORAL DAILY
Qty: 90 TABLET | Refills: 0 | Status: SHIPPED | OUTPATIENT
Start: 2018-02-05 | End: 2018-04-25

## 2018-02-08 DIAGNOSIS — J44.81 BRONCHIOLITIS OBLITERANS (H): ICD-10-CM

## 2018-02-09 NOTE — TELEPHONE ENCOUNTER
"Requested Prescriptions   Pending Prescriptions Disp Refills     VENTOLIN  (90 BASE) MCG/ACT Inhaler [Pharmacy Med Name: VENTOLIN HFA INH W/DOS CTR [id number]]  Last Written Prescription Date:  1-16-17  Last Fill Quantity: 1 INH,  # refills: 11   Last office visit: 10/10/2017 with prescribing provider:  10-10-17   Future Office Visit:   18 g 0     Sig: INHALE 1-2 PUFFS EVERY 4 HOURS AS NEEDED    Asthma Maintenance Inhalers - Anticholinergics Passed    2/8/2018  8:21 PM       Passed - Patient is age 12 years or older       Passed - Recent or future visit with authorizing provider's specialty    Patient had office visit in the last year or has a visit in the next 30 days with authorizing provider.  See \"Patient Info\" tab in inbasket, or \"Choose Columns\" in Meds & Orders section of the refill encounter.               "

## 2018-02-13 RX ORDER — ALBUTEROL SULFATE 90 UG/1
AEROSOL, METERED RESPIRATORY (INHALATION)
Qty: 18 G | Refills: 0 | Status: SHIPPED | OUTPATIENT
Start: 2018-02-13 | End: 2018-12-15

## 2018-03-15 DIAGNOSIS — J44.81 BRONCHIOLITIS OBLITERANS (H): ICD-10-CM

## 2018-03-16 DIAGNOSIS — F41.1 GAD (GENERALIZED ANXIETY DISORDER): ICD-10-CM

## 2018-03-16 RX ORDER — FLUTICASONE PROPIONATE 50 MCG
SPRAY, SUSPENSION (ML) NASAL
Qty: 16 ML | Refills: 4 | Status: SHIPPED | OUTPATIENT
Start: 2018-03-16 | End: 2019-05-30

## 2018-03-16 NOTE — TELEPHONE ENCOUNTER
Confusing script in that it indicates Dr Biswas as last prescriber, but seen by FAHAD Mcgregor in 8/2017. Defer to FAHAD Mcgregor on this. Will forward to her.    Michel Hayes MD  Family Medicine Physician

## 2018-03-16 NOTE — TELEPHONE ENCOUNTER
Routing refill request to provider for review/approval because:  Drug not on the [facility] refill protocol   Per last visit was to kemar villalobos before needing refills (6 Month check )

## 2018-03-16 NOTE — TELEPHONE ENCOUNTER
LORazepam (ATIVAN) 0.5 MG tablet      Last Written Prescription Date:  8-14-17  Last Fill Quantity: 30 TAB,   # refills: 5  Last Office Visit: 10-10-17  Future Office visit:       Routing refill request to provider for review/approval because:  Drug not on the [facility], P or [facility] refill protocol or controlled substance

## 2018-03-19 RX ORDER — LORAZEPAM 0.5 MG/1
TABLET ORAL
Qty: 30 TABLET | Refills: 0 | Status: SHIPPED | OUTPATIENT
Start: 2018-03-19 | End: 2018-04-25

## 2018-03-29 PROBLEM — F41.1 GAD (GENERALIZED ANXIETY DISORDER): Status: ACTIVE | Noted: 2018-03-29

## 2018-04-25 ENCOUNTER — OFFICE VISIT (OUTPATIENT)
Dept: FAMILY MEDICINE | Facility: CLINIC | Age: 43
End: 2018-04-25
Payer: MEDICARE

## 2018-04-25 VITALS
WEIGHT: 124 LBS | TEMPERATURE: 97.9 F | SYSTOLIC BLOOD PRESSURE: 114 MMHG | OXYGEN SATURATION: 97 % | HEART RATE: 134 BPM | RESPIRATION RATE: 20 BRPM | DIASTOLIC BLOOD PRESSURE: 79 MMHG | HEIGHT: 64 IN | BODY MASS INDEX: 21.17 KG/M2

## 2018-04-25 DIAGNOSIS — F41.1 GAD (GENERALIZED ANXIETY DISORDER): Primary | ICD-10-CM

## 2018-04-25 DIAGNOSIS — Z86.79 H/O SINUS TACHYCARDIA: ICD-10-CM

## 2018-04-25 DIAGNOSIS — N95.1 SYMPTOMATIC MENOPAUSAL OR FEMALE CLIMACTERIC STATES: ICD-10-CM

## 2018-04-25 DIAGNOSIS — E89.0 POSTOPERATIVE HYPOTHYROIDISM: ICD-10-CM

## 2018-04-25 DIAGNOSIS — G44.89 OTHER HEADACHE SYNDROME: ICD-10-CM

## 2018-04-25 PROCEDURE — 99214 OFFICE O/P EST MOD 30 MIN: CPT | Performed by: NURSE PRACTITIONER

## 2018-04-25 RX ORDER — LORAZEPAM 0.5 MG/1
TABLET ORAL
Qty: 30 TABLET | Refills: 5 | Status: SHIPPED | OUTPATIENT
Start: 2018-04-25 | End: 2018-11-13

## 2018-04-25 RX ORDER — METOPROLOL SUCCINATE 50 MG/1
50 TABLET, EXTENDED RELEASE ORAL DAILY
Qty: 90 TABLET | Refills: 3 | Status: SHIPPED | OUTPATIENT
Start: 2018-04-25 | End: 2019-04-09

## 2018-04-25 NOTE — MR AVS SNAPSHOT
After Visit Summary   4/25/2018    Clemencia Rosales    MRN: [id number]           Patient Information     Date Of Birth          1975        Visit Information        Provider Department      4/25/2018 3:00 PM Neda Damon APRN CNP [facility]        Today's Diagnoses     DANIELLE (generalized anxiety disorder)    -  1    Symptomatic menopausal or female climacteric states        Postoperative hypothyroidism        H/O sinus tachycardia        Other headache syndrome          Care Instructions    Lorazepam:  Continue to take your lorazepam sparingly/intermittently/only when needed.  Face to face appointments with me every 6 months for lorazepam refills.     OB/GYN:  Follow up with OB/GYN for a full physical with pap smear and hormone testing if appropriate.    Thyroid:  Continue care with Dr. Estrella.     Headache/trigeminal neuralgia:  Continue gabapentin for now.  Follow up with Dr. Rodrigez at your earliest covnenience for a check up/check in.            Follow-ups after your visit        Additional Services     NEUROLOGY ADULT REFERRAL       Your provider has referred you for the following:   Consult at [facility]: [facility] [phone]   [email or web address]    Please be aware that coverage of these services is subject to the terms and limitations of your health insurance plan.  Call member services at your health plan with any benefit or coverage questions.      Please bring the following with you to your appointment:    (1) Any X-Rays, CTs or MRIs which have been performed.  Contact the facility where they were done to arrange for  prior to your scheduled appointment.    (2) List of current medications  (3) This referral request   (4) Any documents/labs given to you for this referral            OB/GYN REFERRAL       Your provider has referred you to:  [facility]: [facility]  [phone]   [email or web address]    Please be aware that coverage of these services is subject to the terms and limitations of your health insurance plan.  Call member services at your health plan with any benefit or coverage questions.      Please bring the following with you to your appointment:    (1) Any X-Rays, CTs or MRIs which have been performed.  Contact the facility where they were done to arrange for  prior to your scheduled appointment.   (2) List of current medications   (3) This referral request   (4) Any documents/labs given to you for this referral                  Who to contact     If you have questions or need follow up information about today's clinic visit or your schedule please contact [facility] directly at [phone].  Normal or non-critical lab and imaging results will be communicated to you by MyChart, letter or phone within 4 business days after the clinic has received the results. If you do not hear from us within 7 days, please contact the clinic through [employer] or phone. If you have a critical or abnormal lab result, we will notify you by phone as soon as possible.  Submit refill requests through Grapeshot or call your pharmacy and they will forward the refill request to us. Please allow 3 business days for your refill to be completed.          Additional Information About Your Visit        Grapeshot Information     Grapeshot gives you secure access to your electronic health record. If you see a primary care provider, you can also send messages to your care team and make appointments. If you have questions, please call your primary care clinic.  If you do not have a primary care provider, please call [phone] and they will assist you.        Care EveryWhere ID     This is your Care EveryWhere ID. This could be used by other organizations to access your [facility] medical records  [id number]        Your Vitals Were     Pulse  "Temperature Respirations Height Pulse Oximetry Breastfeeding?    134 97.9  F (36.6  C) (Oral) 20 5' 4\" (1.626 m) 97% No    BMI (Body Mass Index)                   21.28 kg/m2            Blood Pressure from Last 3 Encounters:   04/25/18 114/79   10/10/17 118/86   08/14/17 (!) 125/91    Weight from Last 3 Encounters:   04/25/18 124 lb (56.2 kg)   10/10/17 123 lb (55.8 kg)   08/14/17 121 lb 6 oz (55.1 kg)              We Performed the Following     NEUROLOGY ADULT REFERRAL     OB/GYN REFERRAL          Where to get your medicines      These medications were sent to [facility] - [place], [place] - [address]  [address], [place] [address]     Phone:  [phone]     metoprolol succinate 50 MG 24 hr tablet         Some of these will need a paper prescription and others can be bought over the counter.  Ask your nurse if you have questions.     Bring a paper prescription for each of these medications     LORazepam 0.5 MG tablet          Primary Care Provider Office Phone # Fax #    ARDEN Patterson [phone]       [address] [place] [address]        Equal Access to Services     PAULO REZA AH: Hadii jose ku hadasho Soomaali, waaxda luqadaha, qaybta kaalmada adeegyada, waxay idiin hayjetn lauren gilmore. So [facility] [phone].    ATENCIÓN: Si habla español, tiene a dowd disposición servicios gratuitos de asistencia lingüística. ame al [phone].    We comply with applicable federal civil rights laws and [place] laws. We do not discriminate on the basis of race, color, national origin, age, disability, sex, sexual orientation, or gender identity.            Thank you!     Thank you for choosing [facility]  for your care. Our goal is always to provide you with excellent care. Hearing back from our patients is one way we can continue to improve our services. Please take a few minutes to complete the written survey that you " may receive in the mail after your visit with us. Thank you!             Your Updated Medication List - Protect others around you: Learn how to safely use, store and throw away your medicines at www.disposemymeds.org.          This list is accurate as of 4/25/18  3:33 PM.  Always use your most recent med list.                   Brand Name Dispense Instructions for use Diagnosis    aspirin 325 MG tablet      Take  by mouth daily. Takes 2 tablets as needed    Seasonal allergic rhinitis, Unspecified symptom associated with female genital organs, Thyrotoxicosis without mention of goiter or other cause, without mention of thyrotoxic crisis or storm, Other congenital anomaly of lung       BENADRYL PO      AS NEEDED        cyclobenzaprine 10 MG tablet    FLEXERIL    30 tablet    Take 0.5-1 tablets (5-10 mg) by mouth 3 times daily as needed for muscle spasms    Fibromyalgia       fluticasone 50 MCG/ACT spray    FLONASE    16 mL    INSTILL TWO SPRAYS INTO BOTH NOSTRILS DAILY    Bronchiolitis obliterans (H)       gabapentin 300 MG capsule    NEURONTIN    90 capsule    TAKE ONE CAPSULE BY MOUTH THREE TIMES DAILY    Episodic tension-type headache, not intractable       hydrocortisone 2.5 % cream           hydrocortisone valerate 0.2 % ointment    WEST-GISELE    45 g    Apply sparingly to affected area three times daily as needed.    Dermatitis       LORazepam 0.5 MG tablet    ATIVAN    30 tablet    Take 1 tablet by mouth every 8 hours as needed for anxiety. 30 tablets to last 30 days.    DANIELLE (generalized anxiety disorder)       metoprolol succinate 50 MG 24 hr tablet    TOPROL-XL    90 tablet    Take 1 tablet (50 mg) by mouth daily    H/O sinus tachycardia       phenylephrine 0.25 % spray    CASTRO-SYNEPHRINE     [place] 1 spray into both nostrils every 4 hours as needed for congestion        SYNTHROID 112 MCG tablet   Generic drug:  levothyroxine      Take  by mouth daily.        VENTOLIN  (90 Base) MCG/ACT Inhaler   Generic  drug:  albuterol     18 g    INHALE 1-2 PUFFS EVERY 4 HOURS AS NEEDED    Bronchiolitis obliterans (H)       VITAMIN D PO

## 2018-04-25 NOTE — PROGRESS NOTES
"  SUBJECTIVE:   Clemencia Rosales is a 42 year old female who presents to clinic today for the following health issues:  Chief Complaint   Patient presents with     Anxiety       History of Present Illness     Depression & Anxiety Follow-up:     Depression/Anxiety:  Anxiety only    Status since last visit::  Improved    Other associated symptoms of anxiety::  None    Significant life event::  No    Current substance use::  None    Depression symptoms::  None       Today's PHQ-9         PHQ-9 Total Score:         PHQ-9 Q9 Suicidal ideation:       Thoughts of suicide or self harm:      Self-harm Plan:        Self-harm Action:          Safety concerns for self or others:            Right breast rash:  Started a year ago.  Better now, but will come and go.    Hormones:  History of thyroid issues, fibromyalgia.  Dr. Estrella at [facility].  Weight is up.  PMS is worse.  \"I'm feeling weirder symptoms close to my period.\"    Lorazepam:  History of anxiety.  She is on a betablocker which helps.  She uses the ativan less than once a day.    Trigeminal neuralgia:  Diagnosed 2015.  Previously evaluated at the [facility].  She has been on neurontin since.  She has been taking neurontin 600-900 mg per day.     Problem list and histories reviewed & adjusted, as indicated.  Additional history: as documented      Patient Active Problem List   Diagnosis     Thyrotoxicosis     Other congenital anomaly of lung     Fibromyalgia     CARDIOVASCULAR SCREENING; LDL GOAL LESS THAN 160     Seasonal allergic rhinitis     Postoperative hypothyroidism     RLQ abdominal pain     Polyarthralgia     obliterative bronchiolitis     H/O sinus tachycardia     Temporary low platelet count (H)     Head pain     Pain of right upper extremity     Controlled substance agreement signed     DANIELLE (generalized anxiety disorder)     Past Surgical History:   Procedure Laterality Date     THORACOSCOPIC BIOPSY RIB      x4     "   Social History   Substance Use Topics     Smoking status: Never Smoker     Smokeless tobacco: Never Used     Alcohol use No     Family History   Problem Relation Age of Onset     DIABETES Paternal Aunt      DIABETES Paternal Uncle      Asthma Other      Asthma Other      Asthma Paternal Uncle      Thyroid Disease No family hx of      female family members has have thyroid issues         Current Outpatient Prescriptions   Medication Sig Dispense Refill     aspirin 325 MG tablet Take  by mouth daily. Takes 2 tablets as needed       BENADRYL OR AS NEEDED       Cholecalciferol (VITAMIN D PO)        cyclobenzaprine (FLEXERIL) 10 MG tablet Take 0.5-1 tablets (5-10 mg) by mouth 3 times daily as needed for muscle spasms 30 tablet 5     fluticasone (FLONASE) 50 MCG/ACT spray INSTILL TWO SPRAYS INTO BOTH NOSTRILS DAILY 16 mL 4     gabapentin (NEURONTIN) 300 MG capsule TAKE ONE CAPSULE BY MOUTH THREE TIMES DAILY 90 capsule 0     hydrocortisone 2.5 % cream   2     hydrocortisone valerate (WEST-GISELE) 0.2 % ointment Apply sparingly to affected area three times daily as needed. 45 g 0     levothyroxine (SYNTHROID) 112 MCG tablet Take  by mouth daily.       LORazepam (ATIVAN) 0.5 MG tablet Take 1 tablet by mouth every 8 hours as needed for anxiety. 30 tablets to last 30 days. 30 tablet 5     metoprolol succinate (TOPROL-XL) 50 MG 24 hr tablet Take 1 tablet (50 mg) by mouth daily 90 tablet 3     phenylephrine (CASTRO-SYNEPHRINE) 0.25 % nasal spray Spray 1 spray into both nostrils every 4 hours as needed for congestion       VENTOLIN  (90 BASE) MCG/ACT Inhaler INHALE 1-2 PUFFS EVERY 4 HOURS AS NEEDED 18 g 0     [DISCONTINUED] LORazepam (ATIVAN) 0.5 MG tablet Take 1 tablet by mouth every 8 hours as needed for anxiety. 30 tablets to last 30 days. 30 tablet 0     [DISCONTINUED] metoprolol succinate (TOPROL-XL) 50 MG 24 hr tablet Take 1 tablet (50 mg) by mouth daily 90 tablet 0     Allergies   Allergen Reactions     Sulfa Drugs  "Other (See Comments)     Never taken medication however \"was told to put it down just in case\"     Recent Labs   Lab Test  10/10/17   1424  05/24/17   1458  01/08/17   0913   06/30/16   1345   06/12/14   0222   01/04/12   1109   LDL   --    --    --    --    --    --    --    --   133*   HDL   --    --    --    --    --    --    --    --   52   TRIG   --    --    --    --    --    --    --    --   78   ALT   --    --   22   --   18   --   27   < >  16   CR   --    --   1.06*   --   0.95   < >  0.91   < >  0.91   GFRESTIMATED   --    --   57*   --   65   < >  70   < >  70   GFRESTBLACK   --    --   69   --   79   < >  84   < >  85   POTASSIUM   --    --   4.1   --   4.0   < >  3.7   < >  4.4   TSH  0.98  0.55  1.34   < >   --    < >  2.06   < >   --     < > = values in this interval not displayed.      BP Readings from Last 3 Encounters:   04/25/18 114/79   10/10/17 118/86   08/14/17 (!) 125/91    Wt Readings from Last 3 Encounters:   04/25/18 124 lb (56.2 kg)   10/10/17 123 lb (55.8 kg)   08/14/17 121 lb 6 oz (55.1 kg)               Labs reviewed in EPIC    ROS:  Constitutional, HEENT, cardiovascular, pulmonary, GI, , musculoskeletal, neuro, skin, endocrine and psych systems are negative, except as otherwise noted.    OBJECTIVE:     /79  Pulse 134  Temp 97.9  F (36.6  C) (Oral)  Resp 20  Ht 5' 4\" (1.626 m)  Wt 124 lb (56.2 kg)  SpO2 97%  Breastfeeding? No  BMI 21.28 kg/m2  Body mass index is 21.28 kg/(m^2).  Constitutional: healthy, alert and no distress  Neck: supple, no adenopathy  Cardiovascular: RRR. No murmurs, clicks gallops or rub  Respiratory: Respirations easy and regular. No respiratory distress noted. Lung sounds clear to auscultation.  Neurologic: Gait normal. Reflexes normal and symmetric. Sensation grossly WNL.  Psychiatric: mentation appears normal and affect normal/bright    ASSESSMENT/PLAN:     (F41.1) DANIELLE (generalized anxiety disorder)  (primary encounter diagnosis)  Comment: "   Plan: LORazepam (ATIVAN) 0.5 MG tablet            (N95.1) Symptomatic menopausal or female climacteric states  Comment:   Plan: OB/GYN REFERRAL            (E89.0) Postoperative hypothyroidism  Comment:   Plan:     (Z86.79) H/O sinus tachycardia  Comment:   Plan: metoprolol succinate (TOPROL-XL) 50 MG 24 hr         tablet        Refills given    (G44.89) Other headache syndrome  Comment:  Plan: NEUROLOGY ADULT REFERRAL                Patient Instructions   Lorazepam:  Continue to take your lorazepam sparingly/intermittently/only when needed.  Face to face appointments with me every 6 months for lorazepam refills.     OB/GYN:  Follow up with OB/GYN for a full physical with pap smear and hormone testing if appropriate.    Thyroid:  Continue care with Dr. Estrella.     Headache/trigeminal neuralgia:  Continue gabapentin for now.  Follow up with Dr. Rodrigez at your earliest covnenience for a check up/check in.              ARDEN Coelho [facility]

## 2018-04-25 NOTE — PATIENT INSTRUCTIONS
Lorazepam:  Continue to take your lorazepam sparingly/intermittently/only when needed.  Face to face appointments with me every 6 months for lorazepam refills.     OB/GYN:  Follow up with OB/GYN for a full physical with pap smear and hormone testing if appropriate.    Thyroid:  Continue care with Dr. Estrella.     Headache/trigeminal neuralgia:  Continue gabapentin for now.  Follow up with Dr. Rodrigez at your earliest covnenience for a check up/check in.

## 2018-05-04 ENCOUNTER — MYC REFILL (OUTPATIENT)
Dept: FAMILY MEDICINE | Facility: CLINIC | Age: 43
End: 2018-05-04

## 2018-05-04 DIAGNOSIS — Z86.79 H/O SINUS TACHYCARDIA: ICD-10-CM

## 2018-05-04 RX ORDER — METOPROLOL SUCCINATE 50 MG/1
50 TABLET, EXTENDED RELEASE ORAL DAILY
Qty: 90 TABLET | Refills: 3 | Status: CANCELLED | OUTPATIENT
Start: 2018-05-04

## 2018-05-04 NOTE — TELEPHONE ENCOUNTER
Message from MyChart:  Original authorizing provider: ARDEN Coelho CNP would like a refill of the following medications:  metoprolol succinate (TOPROL-XL) 50 MG 24 hr tablet [ARDEN Coelho CNP]    Preferred pharmacy: [facility] [address] [place] [address] & [address]    Comment:

## 2018-05-05 DIAGNOSIS — Z86.79 H/O SINUS TACHYCARDIA: ICD-10-CM

## 2018-05-06 NOTE — TELEPHONE ENCOUNTER
"Requested Prescriptions   Pending Prescriptions Disp Refills     metoprolol succinate (TOPROL-XL) 50 MG 24 hr tablet [Pharmacy Med Name: METOPROLOL ER SUCCINATE 50MG TABS]      Last Written Prescription Date:  4/25/2018  Last Fill Quantity: 90 tablets   ,  # refills: 3   Last Office Visit: 4/25/2018   Future Office Visit:      90 tablet 0     Sig: TAKE 1 TABLET BY MOUTH DAILY    Beta-Blockers Protocol Passed    5/5/2018  3:43 PM       Passed - Blood pressure under 140/90 in past 12 months    BP Readings from Last 3 Encounters:   04/25/18 114/79   10/10/17 118/86   08/14/17 (!) 125/91          Passed - Patient is age 6 or older       Passed - Recent (12 mo) or future (30 days) visit within the authorizing provider's specialty    Patient had office visit in the last 12 months or has a visit in the next 30 days with authorizing provider or within the authorizing provider's specialty.  See \"Patient Info\" tab in inbasket, or \"Choose Columns\" in Meds & Orders section of the refill encounter.              "

## 2018-05-10 NOTE — TELEPHONE ENCOUNTER
RX was sent to [facility] pharmacy on 4/25/18 for a 90 day supply eith 3 refills.  Patient can either  there or have [facility] call to transfer.  Left message on machine to call back.  Ask to speak to an RN, let them know it's a return call.    Leave a number and time that you can be reached.   Lorraine Ramos, RN

## 2018-05-14 RX ORDER — METOPROLOL SUCCINATE 50 MG/1
TABLET, EXTENDED RELEASE ORAL
Qty: 90 TABLET | Refills: 0 | OUTPATIENT
Start: 2018-05-14

## 2018-06-12 ENCOUNTER — MYC REFILL (OUTPATIENT)
Dept: FAMILY MEDICINE | Facility: CLINIC | Age: 43
End: 2018-06-12

## 2018-06-12 DIAGNOSIS — M79.7 FIBROMYALGIA: ICD-10-CM

## 2018-06-12 NOTE — TELEPHONE ENCOUNTER
Message from [employer]:  Original authorizing provider: ARDEN Coelho CNP would like a refill of the following medications:  cyclobenzaprine (FLEXERIL) 10 MG tablet [ARDEN Coelho CNP]    Preferred pharmacy: [facility] [id number] [place] [address] & [address]    Comment:

## 2018-06-13 RX ORDER — CYCLOBENZAPRINE HCL 10 MG
5-10 TABLET ORAL 3 TIMES DAILY PRN
Qty: 30 TABLET | Refills: 5 | Status: SHIPPED | OUTPATIENT
Start: 2018-06-13 | End: 2018-11-19

## 2018-07-30 ENCOUNTER — MYC REFILL (OUTPATIENT)
Dept: FAMILY MEDICINE | Facility: CLINIC | Age: 43
End: 2018-07-30

## 2018-07-30 DIAGNOSIS — G44.219 EPISODIC TENSION-TYPE HEADACHE, NOT INTRACTABLE: ICD-10-CM

## 2018-07-30 RX ORDER — GABAPENTIN 300 MG/1
300 CAPSULE ORAL 3 TIMES DAILY
Qty: 90 CAPSULE | Status: SHIPPED | OUTPATIENT
Start: 2018-07-30 | End: 2019-08-31

## 2018-07-30 NOTE — TELEPHONE ENCOUNTER
I did approve the refill request.  It has been a few years since she has met with the neurologist. Does she need to see him again/check in?    Also, she is overdue a mammogram and pap. Please schedule.

## 2018-08-08 ENCOUNTER — TELEPHONE (OUTPATIENT)
Dept: FAMILY MEDICINE | Facility: CLINIC | Age: 43
End: 2018-08-08

## 2018-08-09 ENCOUNTER — MYC MEDICAL ADVICE (OUTPATIENT)
Dept: FAMILY MEDICINE | Facility: CLINIC | Age: 43
End: 2018-08-09

## 2018-08-09 NOTE — TELEPHONE ENCOUNTER
Spoke with pt and scheduled OBGYN appt on 8/15/18 for hormones.    Arielle Albarran [facility] Referral Rep

## 2018-08-10 ENCOUNTER — MEDICAL CORRESPONDENCE (OUTPATIENT)
Dept: HEALTH INFORMATION MANAGEMENT | Facility: CLINIC | Age: 43
End: 2018-08-10

## 2018-08-15 ENCOUNTER — OFFICE VISIT (OUTPATIENT)
Dept: OBGYN | Facility: CLINIC | Age: 43
End: 2018-08-15
Payer: MEDICARE

## 2018-08-15 VITALS
DIASTOLIC BLOOD PRESSURE: 86 MMHG | HEIGHT: 62 IN | WEIGHT: 123 LBS | SYSTOLIC BLOOD PRESSURE: 106 MMHG | BODY MASS INDEX: 22.63 KG/M2

## 2018-08-15 DIAGNOSIS — N95.1 PERIMENOPAUSAL SYMPTOMS: Primary | ICD-10-CM

## 2018-08-15 DIAGNOSIS — E05.00 GRAVES DISEASE: ICD-10-CM

## 2018-08-15 DIAGNOSIS — Z12.4 PAP SMEAR FOR CERVICAL CANCER SCREENING: ICD-10-CM

## 2018-08-15 LAB
FSH SERPL-ACNC: 7 IU/L
T3 SERPL-MCNC: 73 NG/DL (ref 60–181)
T4 FREE SERPL-MCNC: 1.18 NG/DL (ref 0.76–1.46)
TSH SERPL DL<=0.005 MIU/L-ACNC: 0.7 MU/L (ref 0.4–4)

## 2018-08-15 PROCEDURE — 99203 OFFICE O/P NEW LOW 30 MIN: CPT | Performed by: OBSTETRICS & GYNECOLOGY

## 2018-08-15 PROCEDURE — G0476 HPV COMBO ASSAY CA SCREEN: HCPCS | Performed by: OBSTETRICS & GYNECOLOGY

## 2018-08-15 PROCEDURE — 87624 HPV HI-RISK TYP POOLED RSLT: CPT | Performed by: OBSTETRICS & GYNECOLOGY

## 2018-08-15 PROCEDURE — G0145 SCR C/V CYTO,THINLAYER,RESCR: HCPCS | Performed by: OBSTETRICS & GYNECOLOGY

## 2018-08-15 PROCEDURE — 83001 ASSAY OF GONADOTROPIN (FSH): CPT | Performed by: OBSTETRICS & GYNECOLOGY

## 2018-08-15 PROCEDURE — 84439 ASSAY OF FREE THYROXINE: CPT | Performed by: OBSTETRICS & GYNECOLOGY

## 2018-08-15 PROCEDURE — 36415 COLL VENOUS BLD VENIPUNCTURE: CPT | Performed by: OBSTETRICS & GYNECOLOGY

## 2018-08-15 PROCEDURE — 84443 ASSAY THYROID STIM HORMONE: CPT | Performed by: OBSTETRICS & GYNECOLOGY

## 2018-08-15 PROCEDURE — 84480 ASSAY TRIIODOTHYRONINE (T3): CPT | Performed by: OBSTETRICS & GYNECOLOGY

## 2018-08-15 NOTE — MR AVS SNAPSHOT
"              After Visit Summary   8/15/2018    Clemencia Rosales    MRN: [id number]           Patient Information     Date Of Birth          1975        Visit Information        Provider Department      8/15/2018 11:00 AM Dianna Bauer MD [facility]        Today's Diagnoses     Perimenopausal symptoms    -  1    Pap smear for cervical cancer screening        Graves disease           Follow-ups after your visit        Who to contact     If you have questions or need follow up information about today's clinic visit or your schedule please contact [facility] directly at [phone].  Normal or non-critical lab and imaging results will be communicated to you by MyChart, letter or phone within 4 business days after the clinic has received the results. If you do not hear from us within 7 days, please contact the clinic through [employer] or phone. If you have a critical or abnormal lab result, we will notify you by phone as soon as possible.  Submit refill requests through Omada or call your pharmacy and they will forward the refill request to us. Please allow 3 business days for your refill to be completed.          Additional Information About Your Visit        MyChart Information     Omada gives you secure access to your electronic health record. If you see a primary care provider, you can also send messages to your care team and make appointments. If you have questions, please call your primary care clinic.  If you do not have a primary care provider, please call [phone] and they will assist you.        Care EveryWhere ID     This is your Care EveryWhere ID. This could be used by other organizations to access your [facility] medical records  [id number]        Your Vitals Were     Height BMI (Body Mass Index)                5' 2\" (1.575 m) 22.5 kg/m2           Blood Pressure from Last 3 Encounters:   08/15/18 106/86   04/25/18 114/79   10/10/17 118/86    " Weight from Last 3 Encounters:   08/15/18 123 lb (55.8 kg)   04/25/18 124 lb (56.2 kg)   10/10/17 123 lb (55.8 kg)              We Performed the Following     Follicle stimulating hormone     HPV High Risk Types DNA Cervical     Pap imaged thin layer screen with HPV - recommended age 30 - 65 years (select HPV order below)     T3, total     T4, free     TSH        Primary Care Provider Office Phone # Fax #    Neda Damon, ARDEN [facility] [phone] [address] [place] [address]        Equal Access to Services     PAULO REZA : Hadii jose ragland hadasho Soomaali, waaxda luqadaha, qaybta kaalmada bernardo, ralph ivey . So [facility] [phone].    ATENCIÓN: Si habla español, tiene a dowd disposición servicios gratuitos de asistencia lingüística. Llame al [phone].    We comply with applicable federal civil rights laws and [place] laws. We do not discriminate on the basis of race, color, national origin, age, disability, sex, sexual orientation, or gender identity.            Thank you!     Thank you for choosing [facility] your care. Our goal is always to provide you with excellent care. Hearing back from our patients is one way we can continue to improve our services. Please take a few minutes to complete the written survey that you may receive in the mail after your visit with us. Thank you!             Your Updated Medication List - Protect others around you: Learn how to safely use, store and throw away your medicines at www.disposemymeds.org.          This list is accurate as of 8/15/18 11:59 PM.  Always use your most recent med list.                   Brand Name Dispense Instructions for use Diagnosis    aspirin 325 MG tablet      Take  by mouth daily. Takes 2 tablets as needed    Seasonal allergic rhinitis, Unspecified symptom associated with female genital organs, Thyrotoxicosis without mention of goiter or other cause,  without mention of thyrotoxic crisis or storm, Other congenital anomaly of lung       BENADRYL PO      AS NEEDED        cyclobenzaprine 10 MG tablet    FLEXERIL    30 tablet    Take 0.5-1 tablets (5-10 mg) by mouth 3 times daily as needed for muscle spasms    Fibromyalgia       fluticasone 50 MCG/ACT spray    FLONASE    16 mL    INSTILL TWO SPRAYS INTO BOTH NOSTRILS DAILY    Bronchiolitis obliterans (H)       gabapentin 300 MG capsule    NEURONTIN    90 capsule    Take 1 capsule (300 mg) by mouth 3 times daily    Episodic tension-type headache, not intractable       hydrocortisone 2.5 % cream           hydrocortisone valerate 0.2 % ointment    WEST-GISELE    45 g    Apply sparingly to affected area three times daily as needed.    Dermatitis       LORazepam 0.5 MG tablet    ATIVAN    30 tablet    Take 1 tablet by mouth every 8 hours as needed for anxiety. 30 tablets to last 30 days.    DANIELLE (generalized anxiety disorder)       metoprolol succinate 50 MG 24 hr tablet    TOPROL-XL    90 tablet    Take 1 tablet (50 mg) by mouth daily    H/O sinus tachycardia       phenylephrine 0.25 % spray    CASTRO-SYNEPHRINE     [place] 1 spray into both nostrils every 4 hours as needed for congestion        SYNTHROID 112 MCG tablet   Generic drug:  levothyroxine      Take  by mouth daily.        VENTOLIN  (90 Base) MCG/ACT inhaler   Generic drug:  albuterol     18 g    INHALE 1-2 PUFFS EVERY 4 HOURS AS NEEDED    Bronchiolitis obliterans (H)       VITAMIN D PO

## 2018-08-16 NOTE — PROGRESS NOTES
"CC: Discuss hormones  HPI:  Autumn JESSIKA Rosales is a 42 year old female No LMP recorded.  Menses are regular and monthly lasting 2-1/2 days.  Has never been sexually active.  Made appointment today to discuss perimenopause and hormones.  She has no thyroid secondary to Graves' disease and is followed very closely by endocrine.  Even small changes she can feel.  Has noticed her breast size is increasing and has some occasional hot flashes 1 to 2 per week.  Has also noticed increased PMS symptoms.  Has burning tongue and right greater than left pain or cramping with her cycles.    Had FSH level checked in 2011 and was 6.    Allergies: Sulfa drugs    The patient's past medical history, social history and family history is reviewed at our visit and updated in EPIC.    EXAM:  Blood pressure 106/86, height 5' 2\" (1.575 m), weight 123 lb (55.8 kg), not currently breastfeeding.  General - pleasant female in no acute distress, talkative.  Abdomen - soft, nontender, nondistended, no hepatosplenomegaly.  Pelvic - EG: normal adult female, BUS: within normal limits, Vagina: well rugated, no discharge, Cervix: no lesions or CMT, Uterus: firm, normal sized and nontender, Adnexae: no masses or tenderness.  **Virginal spec  Rectovaginal - deferred.  Psychiactric - appropriate mood and affect  Neurological - alert and oriented X 3    No other physical examination was performed today as we spent over 50% of today's 30 minute visit in face-to-face discussion and counseling about perimenopausal symptoms.    ASSESSMENT/PLAN:  (N95.1) Perimenopausal symptoms  (primary encounter diagnosis)  Plan: Follicle stimulating hormone        Check FSH today, but discussed based on age she would be perimenopausal.  Reviewed FSH level can vary depending on the day and time of day so not a great test.  Reviewed symptomatic things she could try.  She is not a great candidate for any hormones given her other issues and medications may change things.  " Discussed possible birth control pill if symptoms get unmanageable.    (Z12.4) Pap smear for cervical cancer screening  Comment: Virginal  Plan: Pap imaged thin layer screen with HPV -         recommended age 30 - 65 years (select HPV order        below), HPV High Risk Types DNA Cervical        Done today since patient was overdue.  Discussed every 5 year Pap smears    (E05.00) Graves disease  Plan: TSH, T4, free, T3, total        Labs checked today at patient request and she will forward to endocrine as needed.      Dianna Bauer MD

## 2018-08-17 LAB
COPATH REPORT: NORMAL
PAP: NORMAL

## 2018-08-20 LAB
FINAL DIAGNOSIS: NORMAL
HPV HR 12 DNA CVX QL NAA+PROBE: NEGATIVE
HPV16 DNA SPEC QL NAA+PROBE: NEGATIVE
HPV18 DNA SPEC QL NAA+PROBE: NEGATIVE
SPECIMEN DESCRIPTION: NORMAL
SPECIMEN SOURCE CVX/VAG CYTO: NORMAL

## 2018-11-08 ENCOUNTER — MYC REFILL (OUTPATIENT)
Dept: FAMILY MEDICINE | Facility: CLINIC | Age: 43
End: 2018-11-08

## 2018-11-08 DIAGNOSIS — F41.1 GAD (GENERALIZED ANXIETY DISORDER): ICD-10-CM

## 2018-11-08 NOTE — TELEPHONE ENCOUNTER
Message from MyChart:  Original authorizing provider: ARDEN Coelho CNP would like a refill of the following medications:  LORazepam (ATIVAN) 0.5 MG tablet [ARDEN Coelho CNP]    Preferred pharmacy: [facility] [id number] [place] [address] - [address] [place] [address] & [address]    Comment:

## 2018-11-08 NOTE — TELEPHONE ENCOUNTER
Routing refill request to provider for review/approval because:  Drug not on the [facility] refill protocol   Requested Prescriptions   Pending Prescriptions Disp Refills     LORazepam (ATIVAN) 0.5 MG tablet 30 tablet 5     Sig: Take 1 tablet by mouth every 8 hours as needed for anxiety. 30 tablets to last 30 days.    There is no refill protocol information for this order

## 2018-11-08 NOTE — TELEPHONE ENCOUNTER
She is due to see me know for her 6-month follow-up appointment for lorazepam refills and a face-to-face clinic appointment.  Please ask her to make an appointment to see me for this request.  Thank you.

## 2018-11-09 RX ORDER — LORAZEPAM 0.5 MG/1
TABLET ORAL
Qty: 30 TABLET | Refills: 5 | Status: CANCELLED | OUTPATIENT
Start: 2018-11-09

## 2018-11-09 NOTE — TELEPHONE ENCOUNTER
Contacted patient. She is now scheduled for an OV on 11/13 with provider at 2:20 pm.       Anisha KAUFFMAN     [facility]

## 2018-11-13 ENCOUNTER — OFFICE VISIT (OUTPATIENT)
Dept: FAMILY MEDICINE | Facility: CLINIC | Age: 43
End: 2018-11-13
Payer: MEDICARE

## 2018-11-13 VITALS
DIASTOLIC BLOOD PRESSURE: 86 MMHG | RESPIRATION RATE: 20 BRPM | TEMPERATURE: 98.3 F | HEART RATE: 102 BPM | SYSTOLIC BLOOD PRESSURE: 134 MMHG | WEIGHT: 125 LBS | BODY MASS INDEX: 23 KG/M2 | HEIGHT: 62 IN | OXYGEN SATURATION: 94 %

## 2018-11-13 DIAGNOSIS — E55.9 VITAMIN D DEFICIENCY: ICD-10-CM

## 2018-11-13 DIAGNOSIS — N95.1 PERIMENOPAUSE: ICD-10-CM

## 2018-11-13 DIAGNOSIS — J44.81 BRONCHIOLITIS OBLITERANS (H): ICD-10-CM

## 2018-11-13 DIAGNOSIS — J02.9 SORE THROAT: ICD-10-CM

## 2018-11-13 DIAGNOSIS — F41.1 GAD (GENERALIZED ANXIETY DISORDER): Primary | ICD-10-CM

## 2018-11-13 DIAGNOSIS — Z86.79 H/O SINUS TACHYCARDIA: ICD-10-CM

## 2018-11-13 PROCEDURE — 82306 VITAMIN D 25 HYDROXY: CPT | Performed by: NURSE PRACTITIONER

## 2018-11-13 PROCEDURE — 36415 COLL VENOUS BLD VENIPUNCTURE: CPT | Performed by: NURSE PRACTITIONER

## 2018-11-13 PROCEDURE — 99214 OFFICE O/P EST MOD 30 MIN: CPT | Performed by: NURSE PRACTITIONER

## 2018-11-13 RX ORDER — LORAZEPAM 0.5 MG/1
TABLET ORAL
Qty: 30 TABLET | Refills: 5 | Status: SHIPPED | OUTPATIENT
Start: 2018-11-13 | End: 2019-06-06

## 2018-11-13 NOTE — PROGRESS NOTES
"  SUBJECTIVE:   Clemencia Rosales is a 42 year old female who presents to clinic today for the following health issues:  {Provider please address medication reconciliation discrepancies--rooming staff please delete if no med/rec issues}    {ACUTE Problem  - brief histories:885206}    {additional problems for provider to add:171962}    Problem list and histories reviewed & adjusted, as indicated.  Additional history: {NONE - AS DOCUMENTED:434782::\"as documented\"}    {HIST REVIEW/ LINKS 2:255827}    Reviewed and updated as needed this visit by clinical staff  Tobacco  Allergies  Meds  Med Hx  Surg Hx  Fam Hx  Soc Hx      Reviewed and updated as needed this visit by Provider         {PROVIDER CHARTING PREFERENCE:341985}    "

## 2018-11-13 NOTE — MR AVS SNAPSHOT
After Visit Summary   11/13/2018    Clemencia Rosales    MRN: [id number]           Patient Information     Date Of Birth          1975        Visit Information        Provider Department      11/13/2018 2:20 PM Neda Damon APRN CNP [facility]        Today's Diagnoses     DANIELLE (generalized anxiety disorder)    -  1    Perimenopause        obliterative bronchiolitis        H/O sinus tachycardia        Sore throat        Vitamin D deficiency           Follow-ups after your visit        Who to contact     If you have questions or need follow up information about today's clinic visit or your schedule please contact [facility] directly at [phone].  Normal or non-critical lab and imaging results will be communicated to you by MyChart, letter or phone within 4 business days after the clinic has received the results. If you do not hear from us within 7 days, please contact the clinic through [employer] or phone. If you have a critical or abnormal lab result, we will notify you by phone as soon as possible.  Submit refill requests through [employer] or call your pharmacy and they will forward the refill request to us. Please allow 3 business days for your refill to be completed.          Additional Information About Your Visit        MyChart Information     [employer] gives you secure access to your electronic health record. If you see a primary care provider, you can also send messages to your care team and make appointments. If you have questions, please call your primary care clinic.  If you do not have a primary care provider, please call [phone] and they will assist you.        Care EveryWhere ID     This is your Care EveryWhere ID. This could be used by other organizations to access your [place] medical records  [id number]        Your Vitals Were     Pulse Temperature Respirations Height Pulse Oximetry Breastfeeding?    102 98.3  F (36.8  C)  "(Oral) 20 5' 2\" (1.575 m) 94% No    BMI (Body Mass Index)                   22.86 kg/m2            Blood Pressure from Last 3 Encounters:   11/13/18 134/86   08/15/18 106/86   04/25/18 114/79    Weight from Last 3 Encounters:   11/13/18 125 lb (56.7 kg)   08/15/18 123 lb (55.8 kg)   04/25/18 124 lb (56.2 kg)              We Performed the Following     Vitamin D Deficiency          Where to get your medicines      Some of these will need a paper prescription and others can be bought over the counter.  Ask your nurse if you have questions.     Bring a paper prescription for each of these medications     LORazepam 0.5 MG tablet          Primary Care Provider Office Phone # Fax #    ARDEN Patterson [phone] [address] [place] [address]        Equal Access to Services     PAULO REZA : Hadii jose garciao Sohoracio, waaxda luqadaha, qaybta kaalmada ademayelinyada, ralph ivey . So [facility] [phone].    ATENCIÓN: Si habla español, tiene a dowd disposición servicios gratuitos de asistencia lingüística. Llame al [phone].    We comply with applicable federal civil rights laws and [place] laws. We do not discriminate on the basis of race, color, national origin, age, disability, sex, sexual orientation, or gender identity.            Thank you!     Thank you for choosing [facility]  for your care. Our goal is always to provide you with excellent care. Hearing back from our patients is one way we can continue to improve our services. Please take a few minutes to complete the written survey that you may receive in the mail after your visit with us. Thank you!             Your Updated Medication List - Protect others around you: Learn how to safely use, store and throw away your medicines at www.disposemymeds.org.          This list is accurate as of 11/13/18  5:38 PM.  Always use your most recent med list.                   " Brand Name Dispense Instructions for use Diagnosis    aspirin 325 MG tablet      Take  by mouth daily. Takes 2 tablets as needed    Seasonal allergic rhinitis, Unspecified symptom associated with female genital organs, Thyrotoxicosis without mention of goiter or other cause, without mention of thyrotoxic crisis or storm, Other congenital anomaly of lung       BENADRYL PO      AS NEEDED        cyclobenzaprine 10 MG tablet    FLEXERIL    30 tablet    Take 0.5-1 tablets (5-10 mg) by mouth 3 times daily as needed for muscle spasms    Fibromyalgia       fluticasone 50 MCG/ACT spray    FLONASE    16 mL    INSTILL TWO SPRAYS INTO BOTH NOSTRILS DAILY    Bronchiolitis obliterans (H)       gabapentin 300 MG capsule    NEURONTIN    90 capsule    Take 1 capsule (300 mg) by mouth 3 times daily    Episodic tension-type headache, not intractable       hydrocortisone 2.5 % cream           hydrocortisone valerate 0.2 % ointment    WEST-GISELE    45 g    Apply sparingly to affected area three times daily as needed.    Dermatitis       LORazepam 0.5 MG tablet    ATIVAN    30 tablet    Take 1 tablet by mouth every 8 hours as needed for anxiety. 30 tablets to last 30 days.    DANIELLE (generalized anxiety disorder)       metoprolol succinate 50 MG 24 hr tablet    TOPROL-XL    90 tablet    Take 1 tablet (50 mg) by mouth daily    H/O sinus tachycardia       phenylephrine 0.25 % spray    CASTRO-SYNEPHRINE     [place] 1 spray into both nostrils every 4 hours as needed for congestion        SYNTHROID 112 MCG tablet   Generic drug:  levothyroxine      Take  by mouth daily.        VENTOLIN  (90 Base) MCG/ACT inhaler   Generic drug:  albuterol     18 g    INHALE 1-2 PUFFS EVERY 4 HOURS AS NEEDED    Bronchiolitis obliterans (H)       VITAMIN D PO

## 2018-11-14 LAB — DEPRECATED CALCIDIOL+CALCIFEROL SERPL-MC: 106 UG/L (ref 20–75)

## 2018-11-14 NOTE — PROGRESS NOTES
Mercy Khanna,    This note is to let you know that your vitamin D level came back above the normal limit of 75 at 106. I would recommend that you hold your vitamin D for a few days and restart at approximately 5000 I U's per day (down from your 9000 I U's per day). I would recommend rechecking your vitamin D level in 2-3 months.    Let me know if you have any questions.    Neda HER CNP

## 2018-12-14 DIAGNOSIS — J44.81 BRONCHIOLITIS OBLITERANS (H): ICD-10-CM

## 2018-12-14 NOTE — TELEPHONE ENCOUNTER
"Requested Prescriptions   Pending Prescriptions Disp Refills     albuterol (VENTOLIN HFA) 108 (90 Base) MCG/ACT inhaler  Last Written Prescription Date:  2-13-18  Last Fill Quantity: 18 g,  # refills: 0   Last office visit: 11/13/2018 with prescribing provider:  Neda Damon    Future Office Visit:    18 g 0    Asthma Maintenance Inhalers - Anticholinergics Passed - 12/14/2018  8:46 AM       Passed - Patient is age 12 years or older       Passed - Recent (12 mo) or future (30 days) visit within the authorizing provider's specialty    Patient had office visit in the last 12 months or has a visit in the next 30 days with authorizing provider or within the authorizing provider's specialty.  See \"Patient Info\" tab in inbasket, or \"Choose Columns\" in Meds & Orders section of the refill encounter.                "

## 2018-12-16 RX ORDER — ALBUTEROL SULFATE 90 UG/1
AEROSOL, METERED RESPIRATORY (INHALATION)
Qty: 18 G | Refills: 11 | Status: SHIPPED | OUTPATIENT
Start: 2018-12-16 | End: 2019-12-23

## 2018-12-16 NOTE — TELEPHONE ENCOUNTER
Routing refill request to provider for review/approval because:  -Associated diagnosis of obliterative bronchiolitis not on refill protcol for this medication    Neda-Please sign if agree.    Thank you!  BIBIANA LondonN, RN

## 2018-12-17 NOTE — TELEPHONE ENCOUNTER
Med refilled by provider   Closing encounter     Briana Bynum Registered Nurse   [facility]

## 2019-01-10 ENCOUNTER — MYC MEDICAL ADVICE (OUTPATIENT)
Dept: FAMILY MEDICINE | Facility: CLINIC | Age: 44
End: 2019-01-10

## 2019-01-10 NOTE — TELEPHONE ENCOUNTER
Per MyChart message below, pt wants to see Dr. Elayne Appiah at the  [facility] [address] [address] [phone] because she is hearing gurgling type sound in right lung.    Arielle Albarran, [place] Referral Rep

## 2019-01-11 ENCOUNTER — ANCILLARY PROCEDURE (OUTPATIENT)
Dept: GENERAL RADIOLOGY | Facility: CLINIC | Age: 44
End: 2019-01-11
Payer: MEDICARE

## 2019-01-11 ENCOUNTER — OFFICE VISIT (OUTPATIENT)
Dept: URGENT CARE | Facility: URGENT CARE | Age: 44
End: 2019-01-11
Payer: MEDICARE

## 2019-01-11 VITALS
SYSTOLIC BLOOD PRESSURE: 116 MMHG | TEMPERATURE: 98.6 F | BODY MASS INDEX: 22.63 KG/M2 | OXYGEN SATURATION: 96 % | HEART RATE: 138 BPM | DIASTOLIC BLOOD PRESSURE: 76 MMHG | WEIGHT: 123 LBS | HEIGHT: 62 IN

## 2019-01-11 DIAGNOSIS — R06.2 WHEEZE: Primary | ICD-10-CM

## 2019-01-11 DIAGNOSIS — R06.2 WHEEZE: ICD-10-CM

## 2019-01-11 PROCEDURE — 94640 AIRWAY INHALATION TREATMENT: CPT | Performed by: PEDIATRICS

## 2019-01-11 PROCEDURE — 71046 X-RAY EXAM CHEST 2 VIEWS: CPT

## 2019-01-11 PROCEDURE — 99214 OFFICE O/P EST MOD 30 MIN: CPT | Mod: 25 | Performed by: PEDIATRICS

## 2019-01-11 RX ORDER — ALBUTEROL SULFATE 1.25 MG/3ML
2.5 SOLUTION RESPIRATORY (INHALATION) ONCE
Status: COMPLETED | OUTPATIENT
Start: 2019-01-11 | End: 2019-01-11

## 2019-01-11 RX ORDER — ALBUTEROL SULFATE 1.25 MG/3ML
1.25 SOLUTION RESPIRATORY (INHALATION) ONCE
Status: DISCONTINUED | OUTPATIENT
Start: 2019-01-11 | End: 2019-01-11

## 2019-01-11 RX ADMIN — ALBUTEROL SULFATE 2.5 MG: 1.25 SOLUTION RESPIRATORY (INHALATION) at 18:25

## 2019-01-11 ASSESSMENT — MIFFLIN-ST. JEOR: SCORE: 1171.17

## 2019-01-11 NOTE — TELEPHONE ENCOUNTER
Message to patient to call and speak to a nurse.    Shanti Jolly, RN, BSN   [facility]3]

## 2019-01-11 NOTE — TELEPHONE ENCOUNTER
Spoke with pt and she is noticing left lower lobe lung gurgling- worse when she lays down-  Has had for 3 days now  No worsening sob from her status quo baseline  No fever  She has underlying lung history since she was a young girl  hasn't ever had this sx before  She will go to  at 5pm- they can eval and pt advised that they may send her to the ER.     Pt in agreement with plan and verbalized understanding.  Thanks!  Osiris Goldman RN  Triage Nurse

## 2019-01-12 NOTE — PROGRESS NOTES
SUBJECTIVE:  Clemencia Rosales is a 43 year old female who presents to the office with the CC of chest pain.  Patient complains of tenderness on the L torso.  The pain is characterized as mild achey located right chest with radiation to none. Symptoms began 3 day(s) ago, intermittent episodes. Mildly tender. Has had a biopsy of her lung many years ago in that area.   Pain is associated with lying down.  Pain is exacerbated by lying down.  Pain is relieved by none.  Cardiac risk factors: negative for, diabetes mellitus, hypertension, obesity    Past Medical History:   Diagnosis Date     Arthritis      Fibromyalgia 2008    [facility]     Head pain 1/16/2015     MEDICAL HISTORY OF -     20 ativan per month (menstral period/BOOP)     obliterative bronchiolitis     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 Takes 2 tablets as needed, Disp: , Rfl:      BENADRYL OR, AS NEEDED, Disp: , Rfl:      Cholecalciferol (VITAMIN D PO), , Disp: , Rfl:      cyclobenzaprine (FLEXERIL) 10 MG tablet, Take 0.5-1 tablets (5-10 mg) by mouth 3 times daily as needed for muscle spasms, Disp: 30 tablet, Rfl: 5     fluticasone (FLONASE) 50 MCG/ACT spray, INSTILL TWO SPRAYS INTO BOTH NOSTRILS DAILY, Disp: 16 mL, Rfl: 4     gabapentin (NEURONTIN) 300 MG capsule, Take 1  "capsule (300 mg) by mouth 3 times daily, Disp: 90 capsule, Rfl: 5.rx     levothyroxine (SYNTHROID) 112 MCG tablet, Take 100 mcg by mouth daily , Disp: , Rfl:      LORazepam (ATIVAN) 0.5 MG tablet, Take 1 tablet by mouth every 8 hours as needed for anxiety. 30 tablets to last 30 days., Disp: 30 tablet, Rfl: 5     metoprolol succinate (TOPROL-XL) 50 MG 24 hr tablet, Take 1 tablet (50 mg) by mouth daily, Disp: 90 tablet, Rfl: 3     hydrocortisone 2.5 % cream, , Disp: , Rfl: 2     hydrocortisone valerate (WEST-GISELE) 0.2 % ointment, Apply sparingly to affected area three times daily as needed. (Patient not taking: Reported on 1/11/2019), Disp: 45 g, Rfl: 0     phenylephrine (CASTRO-SYNEPHRINE) 0.25 % nasal spray, Spray 1 spray into both nostrils every 4 hours as needed for congestion, Disp: , Rfl:     Current Facility-Administered Medications:      albuterol (ACCUNEB) nebulizer solution 1.25 mg, 1.25 mg, Nebulization, Once, Farshad Jean MD    Social History     Tobacco Use     Smoking status: Never Smoker     Smokeless tobacco: Never Used   Substance Use Topics     Alcohol use: No       ROS:CONSTITUTIONAL:NEGATIVE for fever, chills, change in weight  EYES: NEGATIVE for vision changes or irritation  CV: NEGATIVE for chest pain, palpitations or peripheral edema  GI: NEGATIVE for nausea, abdominal pain, heartburn, or change in bowel habits    EXAM:  /76   Pulse 138   Temp 98.6  F (37  C) (Oral)   Ht 1.575 m (5' 2\")   Wt 55.8 kg (123 lb)   LMP 12/05/2018   SpO2 96%   Breastfeeding? No   BMI 22.50 kg/m    GENERAL APPEARANCE: healthy, alert and no distress  EYES: EOMI,  PERRL, conjunctiva clear  HENT: ear canals and TM's normal.  Nose and mouth without ulcers, erythema or lesions  NECK: supple, nontender, no lymphadenopathy  RESP: lungs clear to auscultation - no rales, mild wheezing   CV: regular rates and rhythm, normal S1 S2, no murmur noted  ABDOMEN:  soft, nontender, no HSM or masses and bowel sounds " normal  NEURO: Normal strength and tone, sensory exam grossly normal,  normal speech and mentation  SKIN: no suspicious lesions or rashes     Tried albuterol neb in clinic. Wheezing still present.    CXR appears same as prior.    Assessment  / IMPRESSION  MSK pain    PLAN:See orders in epic

## 2019-02-26 ENCOUNTER — HOSPITAL ENCOUNTER (EMERGENCY)
Facility: CLINIC | Age: 44
Discharge: HOME | End: 2019-02-26
Attending: EMERGENCY MEDICINE | Admitting: EMERGENCY MEDICINE
Payer: MEDICARE

## 2019-02-26 ENCOUNTER — APPOINTMENT (OUTPATIENT)
Dept: CT IMAGING | Facility: CLINIC | Age: 44
End: 2019-02-26
Attending: EMERGENCY MEDICINE
Payer: MEDICARE

## 2019-02-26 VITALS
DIASTOLIC BLOOD PRESSURE: 75 MMHG | OXYGEN SATURATION: 100 % | HEIGHT: 63 IN | BODY MASS INDEX: 21.79 KG/M2 | RESPIRATION RATE: 16 BRPM | TEMPERATURE: 98 F | SYSTOLIC BLOOD PRESSURE: 128 MMHG

## 2019-02-26 DIAGNOSIS — R10.9 FLANK PAIN: ICD-10-CM

## 2019-02-26 DIAGNOSIS — N20.1 URETEROLITHIASIS: ICD-10-CM

## 2019-02-26 LAB
ALBUMIN SERPL-MCNC: 3.9 G/DL (ref 3.4–5)
ALBUMIN UR-MCNC: 10 MG/DL
ALP SERPL-CCNC: 62 U/L (ref 40–150)
ALT SERPL W P-5'-P-CCNC: 28 U/L (ref 0–50)
ANION GAP SERPL CALCULATED.3IONS-SCNC: 10 MMOL/L (ref 3–14)
APPEARANCE UR: ABNORMAL
AST SERPL W P-5'-P-CCNC: 24 U/L (ref 0–45)
BASOPHILS # BLD AUTO: 0 10E9/L (ref 0–0.2)
BASOPHILS NFR BLD AUTO: 0.3 %
BILIRUB SERPL-MCNC: 0.2 MG/DL (ref 0.2–1.3)
BILIRUB UR QL STRIP: NEGATIVE
BUN SERPL-MCNC: 17 MG/DL (ref 7–30)
CALCIUM SERPL-MCNC: 8.2 MG/DL (ref 8.5–10.1)
CHLORIDE SERPL-SCNC: 106 MMOL/L (ref 94–109)
CO2 SERPL-SCNC: 24 MMOL/L (ref 20–32)
COLOR UR AUTO: YELLOW
CREAT SERPL-MCNC: 0.98 MG/DL (ref 0.52–1.04)
DIFFERENTIAL METHOD BLD: ABNORMAL
EOSINOPHIL # BLD AUTO: 0.1 10E9/L (ref 0–0.7)
EOSINOPHIL NFR BLD AUTO: 0.9 %
ERYTHROCYTE [DISTWIDTH] IN BLOOD BY AUTOMATED COUNT: 12.8 % (ref 10–15)
GFR SERPL CREATININE-BSD FRML MDRD: 71 ML/MIN/{1.73_M2}
GLUCOSE SERPL-MCNC: 133 MG/DL (ref 70–99)
GLUCOSE UR STRIP-MCNC: NEGATIVE MG/DL
HCG UR QL: NEGATIVE
HCT VFR BLD AUTO: 43.8 % (ref 35–47)
HGB BLD-MCNC: 14 G/DL (ref 11.7–15.7)
HGB UR QL STRIP: ABNORMAL
IMM GRANULOCYTES # BLD: 0.1 10E9/L (ref 0–0.4)
IMM GRANULOCYTES NFR BLD: 0.9 %
KETONES UR STRIP-MCNC: 5 MG/DL
LEUKOCYTE ESTERASE UR QL STRIP: NEGATIVE
LIPASE SERPL-CCNC: 144 U/L (ref 73–393)
LYMPHOCYTES # BLD AUTO: 1.7 10E9/L (ref 0.8–5.3)
LYMPHOCYTES NFR BLD AUTO: 13.8 %
MCH RBC QN AUTO: 29.9 PG (ref 26.5–33)
MCHC RBC AUTO-ENTMCNC: 32 G/DL (ref 31.5–36.5)
MCV RBC AUTO: 93 FL (ref 78–100)
MONOCYTES # BLD AUTO: 0.7 10E9/L (ref 0–1.3)
MONOCYTES NFR BLD AUTO: 5.7 %
MUCOUS THREADS #/AREA URNS LPF: PRESENT /LPF
NEUTROPHILS # BLD AUTO: 9.4 10E9/L (ref 1.6–8.3)
NEUTROPHILS NFR BLD AUTO: 78.4 %
NITRATE UR QL: NEGATIVE
NRBC # BLD AUTO: 0 10*3/UL
NRBC BLD AUTO-RTO: 0 /100
PH UR STRIP: 6 PH (ref 5–7)
PLATELET # BLD AUTO: 157 10E9/L (ref 150–450)
POTASSIUM SERPL-SCNC: 3.8 MMOL/L (ref 3.4–5.3)
PROT SERPL-MCNC: 7.3 G/DL (ref 6.8–8.8)
RBC # BLD AUTO: 4.69 10E12/L (ref 3.8–5.2)
RBC #/AREA URNS AUTO: >182 /HPF (ref 0–2)
SODIUM SERPL-SCNC: 141 MMOL/L (ref 133–144)
SOURCE: ABNORMAL
SP GR UR STRIP: 1.02 (ref 1–1.03)
SQUAMOUS #/AREA URNS AUTO: 11 /HPF (ref 0–1)
TRANS CELLS #/AREA URNS HPF: <1 /HPF (ref 0–1)
UROBILINOGEN UR STRIP-MCNC: NORMAL MG/DL (ref 0–2)
WBC # BLD AUTO: 11.9 10E9/L (ref 4–11)
WBC #/AREA URNS AUTO: 2 /HPF (ref 0–5)

## 2019-02-26 PROCEDURE — 83690 ASSAY OF LIPASE: CPT | Performed by: EMERGENCY MEDICINE

## 2019-02-26 PROCEDURE — 74177 CT ABD & PELVIS W/CONTRAST: CPT

## 2019-02-26 PROCEDURE — 99284 EMERGENCY DEPT VISIT MOD MDM: CPT | Mod: 25 | Performed by: EMERGENCY MEDICINE

## 2019-02-26 PROCEDURE — 81025 URINE PREGNANCY TEST: CPT | Performed by: EMERGENCY MEDICINE

## 2019-02-26 PROCEDURE — 85025 COMPLETE CBC W/AUTO DIFF WBC: CPT | Performed by: EMERGENCY MEDICINE

## 2019-02-26 PROCEDURE — 96361 HYDRATE IV INFUSION ADD-ON: CPT | Performed by: EMERGENCY MEDICINE

## 2019-02-26 PROCEDURE — 81001 URINALYSIS AUTO W/SCOPE: CPT | Performed by: EMERGENCY MEDICINE

## 2019-02-26 PROCEDURE — 25800030 ZZH RX IP 258 OP 636: Performed by: EMERGENCY MEDICINE

## 2019-02-26 PROCEDURE — 96374 THER/PROPH/DIAG INJ IV PUSH: CPT | Mod: 59 | Performed by: EMERGENCY MEDICINE

## 2019-02-26 PROCEDURE — 80053 COMPREHEN METABOLIC PANEL: CPT | Performed by: EMERGENCY MEDICINE

## 2019-02-26 PROCEDURE — 25000128 H RX IP 250 OP 636: Performed by: EMERGENCY MEDICINE

## 2019-02-26 PROCEDURE — 96375 TX/PRO/DX INJ NEW DRUG ADDON: CPT | Performed by: EMERGENCY MEDICINE

## 2019-02-26 PROCEDURE — 99284 EMERGENCY DEPT VISIT MOD MDM: CPT | Mod: Z6 | Performed by: EMERGENCY MEDICINE

## 2019-02-26 PROCEDURE — 25000128 H RX IP 250 OP 636: Performed by: RADIOLOGY

## 2019-02-26 RX ORDER — IOPAMIDOL 755 MG/ML
76 INJECTION, SOLUTION INTRAVASCULAR ONCE
Status: COMPLETED | OUTPATIENT
Start: 2019-02-26 | End: 2019-02-26

## 2019-02-26 RX ORDER — ONDANSETRON 2 MG/ML
4 INJECTION INTRAMUSCULAR; INTRAVENOUS EVERY 30 MIN PRN
Status: DISCONTINUED | OUTPATIENT
Start: 2019-02-26 | End: 2019-02-26 | Stop reason: HOSPADM

## 2019-02-26 RX ORDER — TRAMADOL HYDROCHLORIDE 50 MG/1
50 TABLET ORAL EVERY 6 HOURS PRN
Qty: 10 TABLET | Refills: 0 | Status: SHIPPED | OUTPATIENT
Start: 2019-02-26 | End: 2019-03-03

## 2019-02-26 RX ORDER — SODIUM CHLORIDE 9 MG/ML
1000 INJECTION, SOLUTION INTRAVENOUS CONTINUOUS
Status: DISCONTINUED | OUTPATIENT
Start: 2019-02-26 | End: 2019-02-26 | Stop reason: HOSPADM

## 2019-02-26 RX ORDER — HYDROMORPHONE HYDROCHLORIDE 1 MG/ML
0.5 INJECTION, SOLUTION INTRAMUSCULAR; INTRAVENOUS; SUBCUTANEOUS
Status: COMPLETED | OUTPATIENT
Start: 2019-02-26 | End: 2019-02-26

## 2019-02-26 RX ADMIN — Medication 0.5 MG: at 14:28

## 2019-02-26 RX ADMIN — IOPAMIDOL 76 ML: 755 INJECTION, SOLUTION INTRAVENOUS at 15:22

## 2019-02-26 RX ADMIN — ONDANSETRON 4 MG: 2 INJECTION INTRAMUSCULAR; INTRAVENOUS at 14:27

## 2019-02-26 RX ADMIN — SODIUM CHLORIDE 1000 ML: 9 INJECTION, SOLUTION INTRAVENOUS at 15:53

## 2019-02-26 RX ADMIN — SODIUM CHLORIDE 1000 ML: 9 INJECTION, SOLUTION INTRAVENOUS at 14:26

## 2019-02-26 ASSESSMENT — ENCOUNTER SYMPTOMS
ANAL BLEEDING: 0
CONSTIPATION: 0
ABDOMINAL PAIN: 1
ABDOMINAL DISTENTION: 1
DIARRHEA: 0
DYSURIA: 0
NAUSEA: 1
VOMITING: 0
FEVER: 0
FREQUENCY: 0
DIFFICULTY URINATING: 0
HEMATURIA: 0
APPETITE CHANGE: 1
BLOOD IN STOOL: 0

## 2019-02-26 NOTE — ED NOTES
Bed: [facility]  Expected date:   Expected time:   Means of arrival:   Comments:  [facility]

## 2019-02-26 NOTE — DISCHARGE INSTRUCTIONS
Please make an appointment to follow up with Your Primary Care Provider in 3-7 days even if entirely better. Call Urology Clinic (phone: [phone]) as needed for ongoing pain.    Strain urine and bring stone in for testing. Drink lots of fluids to help flush out the stone.   Take Tramadol for severe pain, as needed.  Do not take this medication with alcohol or other sedating medications.  Do not drive while taking this medication as it can make you sleepy and slower to respond.  Take more of this medication than prescribed.    Take ibuprofen or Tylenol, over-the-counter, as needed for mild to moderate pain.    Return to the emergency department if you develop worsening pain, nausea or vomiting, inability to keep down your medications, fever, blood in your urine, or decreased urine output.

## 2019-02-26 NOTE — ED AVS SNAPSHOT
[facility], [place], Emergency Department  [address] [place] [id number]  Phone:  [phone]                                    Clemencia Rosales   MRN: [id number]    Department:  [facility], Emergency Department   Date of Visit:  2/26/2019           After Visit Summary Signature Page    I have received my discharge instructions, and my questions have been answered. I have discussed any challenges I see with this plan with the nurse or doctor.    ..........................................................................................................................................  Patient/Patient Representative Signature      ..........................................................................................................................................  Patient Representative Print Name and Relationship to Patient    ..................................................               ................................................  Date                                   Time    ..........................................................................................................................................  Reviewed by Signature/Title    ...................................................              ..............................................  Date                                               Time          22EPIC Rev 08/18

## 2019-02-26 NOTE — ED PROVIDER NOTES
[place] EMERGENCY DEPARTMENT ([facility])  2/26/19   History     Chief Complaint   Patient presents with     Abdominal Pain     The history is provided by the patient.     Clemencia Rosales is a 43 year old female with history notable for fibromyalgia, obliterative bronchiolitis, and anxiety who presents to the Emergency Department for evaluation of RLQ abdominal pain.  Patient reports that she woke up this morning with severe pain in her RLQ; she states that the pain was a burning type pain when she first woke up this morning, but it is now a sharper pain.  She states that it has been constant throughout the day.  She notes some mild radiation to her back and she reports that she had some mild radiation down her right leg earlier this morning, but the radiation down the leg has since resolved.  She notes associated mild nausea, but denies any episodes of vomiting.  She does feel that her abdomen is mildly distended.  She also denies any diarrhea or constipation, she reports that her last bowel movement was earlier this morning and was normal.  She denies any fevers, urinary symptoms, vaginal bleeding or vaginal discharge.  She is not sexually active.  She denies any history of kidney stones.  She denies any past abdominal surgeries.  She states that she has not had a significant appetite today and she has not been eating or drinking normally today.    PAST MEDICAL HISTORY  Past Medical History:   Diagnosis Date     Arthritis      Fibromyalgia 2008    [place]     Head pain 1/16/2015     MEDICAL HISTORY OF -     20 ativan per month (menstral period/BOOP)     obliterative bronchiolitis     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     Pneumonia     viral pneumonia-age 14yo, was intubated in the ICU     Sinus tachycardia  "     Temporary low platelet count (H) 6/12/2014     Thyrotoxicosis without mention of goiter or other cause, without mention of thyrotoxic crisis or storm 2002    Treated with irradiation-on synthroid     PAST SURGICAL HISTORY  Past Surgical History:   Procedure Laterality Date     THORACOSCOPIC BIOPSY RIB      x4     thyroid radiation      secondary to graves     FAMILY HISTORY  Family History   Problem Relation Age of Onset     Diabetes Paternal Aunt      Diabetes Paternal Uncle      Asthma Other      Asthma Other      Asthma Paternal Uncle      Thyroid Disease No family hx of         female family members has have thyroid issues     SOCIAL HISTORY  Social History     Tobacco Use     Smoking status: Never Smoker     Smokeless tobacco: Never Used   Substance Use Topics     Alcohol use: No     MEDICATIONS  No current facility-administered medications for this encounter.      Current Outpatient Medications   Medication     albuterol (VENTOLIN HFA) 108 (90 Base) MCG/ACT inhaler     aspirin 325 MG tablet     BENADRYL OR     Cholecalciferol (VITAMIN D PO)     cyclobenzaprine (FLEXERIL) 10 MG tablet     fluticasone (FLONASE) 50 MCG/ACT spray     gabapentin (NEURONTIN) 300 MG capsule     hydrocortisone 2.5 % cream     hydrocortisone valerate (WEST-GISELE) 0.2 % ointment     levothyroxine (SYNTHROID) 112 MCG tablet     LORazepam (ATIVAN) 0.5 MG tablet     metoprolol succinate (TOPROL-XL) 50 MG 24 hr tablet     phenylephrine (CASTRO-SYNEPHRINE) 0.25 % nasal spray     traMADol (ULTRAM) 50 MG tablet     ALLERGIES  Allergies   Allergen Reactions     Sulfa Drugs Other (See Comments)     Never taken medication however \"was told to put it down just in case\"         I have reviewed the Medications, Allergies, Past Medical and Surgical History, and Social History in the Epic system.    Review of Systems   Constitutional: Positive for appetite change (decrease). Negative for fever.   Gastrointestinal: Positive for abdominal distention " "(mild), abdominal pain (RLQ) and nausea (mild). Negative for anal bleeding, blood in stool, constipation, diarrhea and vomiting.   Genitourinary: Negative for decreased urine volume, difficulty urinating, dysuria, frequency, hematuria, urgency, vaginal bleeding and vaginal discharge.   All other systems reviewed and are negative.      Physical Exam   BP: 116/70  Heart Rate: 85  Temp: 97.3  F (36.3  C)  Resp: 20  Height: 160 cm (5' 3\")  SpO2: 100 %      Physical Exam   Constitutional: She is oriented to person, place, and time. She appears well-developed and well-nourished. No distress.   HENT:   Head: Normocephalic and atraumatic.   Nose: Nose normal.   Mouth/Throat: Mucous membranes are dry.   Eyes: Conjunctivae are normal. No scleral icterus.   Neck: Normal range of motion. Neck supple.   Cardiovascular: Normal rate, regular rhythm, normal heart sounds and intact distal pulses.   Pulmonary/Chest: Breath sounds normal. No stridor. No respiratory distress. She has no wheezes. She has no rales.   Abdominal: Soft. Bowel sounds are normal. She exhibits no distension and no mass. There is no tenderness. There is CVA tenderness. There is no rigidity, no rebound, no guarding and no tenderness at McBurney's point. No hernia.   Genitourinary: Vagina normal. Right adnexum displays no mass, no tenderness and no fullness. Left adnexum displays no mass and no fullness. No vaginal discharge found.   Musculoskeletal: Normal range of motion. She exhibits no deformity.   Neurological: She is alert and oriented to person, place, and time.   Skin: Skin is warm and dry. No rash noted. She is not diaphoretic. There is pallor.   Psychiatric: She has a normal mood and affect. Her behavior is normal. Thought content normal.   Nursing note and vitals reviewed.      ED Course   2:14 PM  The patient was seen and examined by Kate Laguna MD in [facility]        Procedures             Critical Care time:  none             Labs Ordered and " Resulted from Time of ED Arrival Up to the Time of Departure from the ED   CBC WITH PLATELETS DIFFERENTIAL - Abnormal; Notable for the following components:       Result Value    WBC 11.9 (*)     Absolute Neutrophil 9.4 (*)     All other components within normal limits   COMPREHENSIVE METABOLIC PANEL - Abnormal; Notable for the following components:    Glucose 133 (*)     Calcium 8.2 (*)     All other components within normal limits   ROUTINE UA WITH MICROSCOPIC - Abnormal; Notable for the following components:    Ketones Urine 5 (*)     Blood Urine Moderate (*)     Protein Albumin Urine 10 (*)     RBC Urine >182 (*)     Squamous Epithelial /HPF Urine 11 (*)     Mucous Urine Present (*)     All other components within normal limits   LIPASE   HCG QUALITATIVE URINE   PERIPHERAL IV CATHETER   PREP FOR PROCEDURE            Assessments & Plan (with Medical Decision Making)   Clemencia Rosales is a 43 year old female with history notable for fibromyalgia, obliterative bronchiolitis, and anxiety who presents to the Emergency Department for evaluation of RLQ abdominal pain.    Ddx: UTI/pyelo, UVJ stone, ovarian torsion, appy    Patient without notable abdominal tenderness on palpation in the area of described pain.  She does have minor right-sided flank tenderness.  Bimanual pelvic exam without adnexal tenderness or fullness.  Unable to palpate patient's cervix.  She does not have risk factors for STI stating that she is a virgin.  No discharge or unusual vaginal bleeding.  Urine with large RBCs.  No evidence of infection.  CT abdomen pelvis obtained for evaluation of appendicitis versus urolithiasis.  This resulted with a 2 mm punctate stone at the right UVJ.  Mild right hydroureteronephrosis.  This is likely explanation for patient's pain and correlates well with patient's physical exam.  Symptoms dramatically improved after IV Dilaudid and Zofran as well as IV fluids.  I discussed the results of patient's CT scan with  the patient and her mother.  She was agreeable to returning home with a prescription of tramadol in case her severe pain returns.  She declined anything for nausea.  Recommended straining her urine and following up with her primary care doctor.  I also recommended aggressive p.o. hydration.  Patient is on a beta-blocker and I did not think the risk of orthostatic hypotension or syncope from Flomax would outweigh the theoretic benefit.  Discharged with strict return precautions including worsening pain, fever, decreased urine output.    I have reviewed the nursing notes.    I have reviewed the findings, diagnosis, plan and need for follow up with the patient.       Medication List      Started    traMADol 50 MG tablet  Commonly known as:  ULTRAM  50 mg, Oral, EVERY 6 HOURS PRN            Final diagnoses:   Flank pain   Ureterolithiasis     I, Lion Aguilar, am serving as a trained medical scribe to document services personally performed by Kate Laguna MD, based on the provider's statements to me.   Kate MARTINEZ MD, was physically present and have reviewed and verified the accuracy of this note documented by Lion Aguilar.    2/26/2019   [facility], [place], EMERGENCY DEPARTMENT     Kate Laguna MD  02/26/19 2005

## 2019-02-26 NOTE — ED TRIAGE NOTES
BIBA for RLQ pain from home, pain started this am. Hx graves disease, lung disease, and fibromyalgia. Has not eaten anything today, bowel movement this am.

## 2019-03-06 ENCOUNTER — OFFICE VISIT (OUTPATIENT)
Dept: FAMILY MEDICINE | Facility: CLINIC | Age: 44
End: 2019-03-06
Payer: MEDICARE

## 2019-03-06 VITALS
HEIGHT: 63 IN | BODY MASS INDEX: 22.5 KG/M2 | SYSTOLIC BLOOD PRESSURE: 124 MMHG | OXYGEN SATURATION: 97 % | RESPIRATION RATE: 20 BRPM | DIASTOLIC BLOOD PRESSURE: 74 MMHG | TEMPERATURE: 97.3 F | HEART RATE: 144 BPM | WEIGHT: 127 LBS

## 2019-03-06 DIAGNOSIS — M79.7 FIBROMYALGIA: ICD-10-CM

## 2019-03-06 DIAGNOSIS — N20.1 URETEROLITHIASIS: Primary | ICD-10-CM

## 2019-03-06 PROCEDURE — 99000 SPECIMEN HANDLING OFFICE-LAB: CPT | Performed by: NURSE PRACTITIONER

## 2019-03-06 PROCEDURE — 82365 CALCULUS SPECTROSCOPY: CPT | Mod: 90 | Performed by: NURSE PRACTITIONER

## 2019-03-06 PROCEDURE — 99214 OFFICE O/P EST MOD 30 MIN: CPT | Performed by: NURSE PRACTITIONER

## 2019-03-06 ASSESSMENT — MIFFLIN-ST. JEOR: SCORE: 1205.2

## 2019-03-06 NOTE — PROGRESS NOTES
"  SUBJECTIVE:   Clemencia Rosales is a 43 year old female who presents to clinic today for the following health issues:      ED/UC Followup:    Facility:  [facility]  Date of visit: 02/26/2019  Reason for visit: abdominal pain- flank   Current Status: improved     Clemencia reports that on 2/26/2019 she had a sudden onset of pain and burning in her back.  She went to ER by 911.  CT scan showed a right kidney stone.  She was given a prescription of tramadol and \"never needed it.\"  She strained her urine.  The next day she passed a stone (she brought it in today).    Today no fever, chills.  She is eating and drinking normally.  No bladder infection symptoms.    \"I am back to feeling my normal self.\"    Thyroid:  Managed by endocrinology, Dr. Watson.  Recent med adjustments were made.    Fibromyalgia:  Stable.      Problem list and histories reviewed & adjusted, as indicated.  Additional history: as documented    Patient Active Problem List   Diagnosis     Thyrotoxicosis     Other congenital anomaly of lung     Fibromyalgia     CARDIOVASCULAR SCREENING; LDL GOAL LESS THAN 160     Seasonal allergic rhinitis     Postoperative hypothyroidism     RLQ abdominal pain     Polyarthralgia     obliterative bronchiolitis     H/O sinus tachycardia     Temporary low platelet count (H)     Head pain     Pain of right upper extremity     Controlled substance agreement signed     DANIELLE (generalized anxiety disorder)     Past Surgical History:   Procedure Laterality Date     THORACOSCOPIC BIOPSY RIB      x4     thyroid radiation      secondary to graves       Social History     Tobacco Use     Smoking status: Never Smoker     Smokeless tobacco: Never Used   Substance Use Topics     Alcohol use: No     Family History   Problem Relation Age of Onset     Diabetes Paternal Aunt      Diabetes Paternal Uncle      Asthma Other      Asthma Other      Asthma Paternal Uncle      Thyroid Disease No family hx of         female family members has have thyroid " "issues         Current Outpatient Medications   Medication Sig Dispense Refill     albuterol (VENTOLIN HFA) 108 (90 Base) MCG/ACT inhaler INHALE 1-2 PUFFS EVERY 4 HOURS AS NEEDED 18 g 11     aspirin 325 MG tablet Take  by mouth daily. Takes 2 tablets as needed       BENADRYL OR AS NEEDED       Cholecalciferol (VITAMIN D PO)        cyclobenzaprine (FLEXERIL) 10 MG tablet Take 0.5-1 tablets (5-10 mg) by mouth 3 times daily as needed for muscle spasms 30 tablet 5     fluticasone (FLONASE) 50 MCG/ACT spray INSTILL TWO SPRAYS INTO BOTH NOSTRILS DAILY 16 mL 4     gabapentin (NEURONTIN) 300 MG capsule Take 1 capsule (300 mg) by mouth 3 times daily 90 capsule 5.rx     hydrocortisone 2.5 % cream   2     levothyroxine (SYNTHROID) 112 MCG tablet Take 100 mcg by mouth daily        LORazepam (ATIVAN) 0.5 MG tablet Take 1 tablet by mouth every 8 hours as needed for anxiety. 30 tablets to last 30 days. 30 tablet 5     metoprolol succinate (TOPROL-XL) 50 MG 24 hr tablet Take 1 tablet (50 mg) by mouth daily 90 tablet 3     phenylephrine (CASTRO-SYNEPHRINE) 0.25 % nasal spray Spray 1 spray into both nostrils every 4 hours as needed for congestion       hydrocortisone valerate (WEST-GISELE) 0.2 % ointment Apply sparingly to affected area three times daily as needed. (Patient not taking: Reported on 3/6/2019) 45 g 0     Allergies   Allergen Reactions     Sulfa Drugs Other (See Comments)     Never taken medication however \"was told to put it down just in case\"     Recent Labs   Lab Test 02/26/19  1324 08/15/18  1147 10/10/17  1424  01/08/17  0913  06/30/16  1345  01/04/12  1109   LDL  --   --   --   --   --   --   --   --  133*   HDL  --   --   --   --   --   --   --   --  52   TRIG  --   --   --   --   --   --   --   --  78   ALT 28  --   --   --  22  --  18   < > 16   CR 0.98  --   --   --  1.06*  --  0.95   < > 0.91   GFRESTIMATED 71  --   --   --  57*  --  65   < > 70   GFRESTBLACK 82  --   --   --  69  --  79   < > 85   POTASSIUM 3.8  " "--   --   --  4.1  --  4.0   < > 4.4   TSH  --  0.70 0.98   < > 1.34   < >  --    < >  --     < > = values in this interval not displayed.      BP Readings from Last 3 Encounters:   03/06/19 124/74   02/26/19 128/75   01/11/19 116/76    Wt Readings from Last 3 Encounters:   03/06/19 57.6 kg (127 lb)   01/11/19 55.8 kg (123 lb)   11/13/18 56.7 kg (125 lb)               Labs reviewed in EPIC    Reviewed and updated as needed this visit by clinical staff       Reviewed and updated as needed this visit by Provider         ROS:  Constitutional, HEENT, cardiovascular, pulmonary, GI, , musculoskeletal, neuro, skin, endocrine and psych systems are negative, except as otherwise noted.    OBJECTIVE:     /74   Pulse 144   Temp 97.3  F (36.3  C) (Oral)   Resp 20   Ht 1.6 m (5' 3\")   Wt 57.6 kg (127 lb)   LMP 02/19/2019 (Approximate)   SpO2 97%   Breastfeeding? No   BMI 22.50 kg/m    Body mass index is 22.5 kg/m .    Constitutional: healthy, alert and no distress  Neck: supple, no adenopathy  Cardiovascular: RRR. No murmurs, clicks gallops or rub  Respiratory: Respirations easy and regular. No respiratory distress noted. Lung sounds clear to auscultation.  Gastrointestinal: abdomen soft, nontender to light or deep palpation. Bowel sounds active in 4 quadrants. No hepatosplenomegaly. No rebound or guarding. No CVA tenderness,  MS: ambulatory with a steady gait.  Neurologic: Gait normal. Reflexes normal and symmetric. Sensation grossly WNL.  Psychiatric: mentation appears normal and affect normal/bright      ASSESSMENT/PLAN:     (N20.1) Ureterolithiasis  (primary encounter diagnosis)  Comment: resolved  Plan: Stone analysis        The stone today was sent for analysis.  Results are pending.  I encouraged a healthy diet, pushing fluids, etc.  I did tell her that this kidney stone may be a \"fluke\" and may never effect her again.  If additional problems, ER if necessary or consider urology.  She verbalizes " understanding.    (M79.7) Fibromyalgia  Comment:   Plan: continue fibro care.        ARDEN Coelho [facility]

## 2019-03-06 NOTE — PATIENT INSTRUCTIONS
Patient Education     Lorazepam:  Continue to take sparingly/as prescribed.  Return to the clinic May 2019 for a recheck/refills, sooner if problems.          Kidney Stone, Passed    A kidney stone (nephrolithiasis) starts as tiny crystals that form inside the kidney where urine is made. Most kidney stones enlarge to about 1/8 to 1/4 inch in size before leaving the kidney and moving toward the bladder. The sharp, cramping pain and nausea/vomiting you had was the stone moving through the ureter (the narrow tube joining the kidney to the bladder). Once the stone reaches your bladder, the pain stops. Pain may start again as the stone passes through the bladder and out through the urethra. There are 4 types of kidney stones. Eighty percent are calcium stones--mostly calcium oxalate but also some with calcium phosphate. The other 3 types include uric acid stones, struvite stones (from a preceding infection), and rarely, cystine stones.  Home care  The following guidelines will help you care for yourself at home:    Drink plenty of fluids. This increases urine flow and reduces the chance that a new stone will form. Healthy adults (no heart/liver/kidney disease) who have had a kidney stone should drink 12, 8-ounce glasses of fluids per day. Most of this should be water. The goal is to produce 1.5 to 2 quarts of almost colorless urine per 24 hours.    Unless another NSAID (non-steroidal anti-inflammatory drug) was given, you may take ibuprofen or naproxen in addition to any narcotic pain medicine your healthcare provider prescribes. If you have chronic liver or kidney disease or ever had a stomach ulcer or GI bleeding, talk with your healthcare provider before using these medicines.    Collecting the stone. If you were given a strainer, urinate into a jar then pour the urine through the strainer and into the toilet. Keep doing this for 24 hours after your pain stops. Save any stone that you find in the strainer and bring it  to your healthcare provider for analysis. If you do not collect a stone, a 24-hour urine specimen can be done at a later time by your healthcare provider to figure out the cause of your stone.  Prevention  Each year, there is a chance that a new stone will form (50% chance over the next 5 to 7 years). The risk is highest if you have a family history of kidney stones or have certain chronic illnesses such as hypertension, obesity, or diabetes. However, there are lifestyle and dietary changes that you can make to reduce the risk of getting another kidney stone.  Most kidney stones are made of calcium. The following advice can help you prevent calcium stones. If you don t know the type of stone you have, follow this advice until the healthcare provider determines the cause of your stone.  Things that help:    The most important thing you can do is to drink plenty of fluids each day, as described above.    Certain foods, such as wheat, rice, rye, barley, and beans, contain phytate. Phytate is a compound that may lower the risk of recurrence of any type of stone.    Eat more fruits and vegetables, especially those high in potassium.    Eat foods high in natural citrate like fruit and fruit juices (using low sugar).    Low calcium contributes to calcium type kidney stones. Eat a normal calcium diet and talk with your healthcare provider if you are taking calcium supplements. It may be detrimental to lower your calcium intake. New research shows that eating calcium-rich and oxalate-rich foods together lowers your risk of stones. This is because eating these foods together binds the minerals in the stomach and intestines before they can reach the kidneys.    Limit salt intake to 2 grams (1 teaspoon) per day. Use limited amounts when cooking, and don t add salt at the table. Processed and canned foods are usually high in salt.    Spinach, rhubarb, peanuts, cashews, almonds, grapefruit, and grapefruit juice are all high  oxalate foods and should be reduced, or eaten with calcium-rich foods. These foods include dairy, dark leafy greens, soy products, calcium-enriched foods, and others.    Reduce the amount of animal meat and high protein foods in your diet. This may lower your risk of uric acid stones.    Don't have excess sugar (sucrose) and fructose (sweetener in many soft drinks) in your diet.    If you take vitamin C as a supplement, don't take more than 1,000 milligrams (mg) per day.    A dietitian or your healthcare provider can give you ideas on how to change your diet to prevent more kidney stones.  Follow-up care  Follow up with your healthcare provider, or as advised. Even if you don't collect the kidney stone, it is possible to analyze a 24-hour urine collection for the cause of this stone. Discuss this with your healthcare provider.  If you had an X-ray, CT scan, or other diagnostic test, you will be told of any findings that may affect your care.  Call 911  Call 911 if you have any of these:    Weakness, dizziness, or fainting  When to seek medical advice  Call your healthcare provider right away if any of the these occur:    Severe pain that returns and not relieved by pain medicines    Repeated vomiting or unable to keep down fluids    Fever of 100.4 F (38 C) or higher, or as directed by your healthcare provider    Blood clots in urine    Foul smelling or cloudy urine    Unable to pass urine for 8 hours or increasing bladder pressure  Date Last Reviewed: 10/1/2016    9163-3470 [employer]. [address] [place] [address], [place] [address]. All rights reserved. This information is not intended as a substitute for professional medical care. Always follow your healthcare professional's instructions.

## 2019-03-09 LAB
APPEARANCE STONE: NORMAL
COMPN STONE: NORMAL
NUMBER STONE: NORMAL
SIZE STONE: NORMAL MM
WT STONE: NORMAL MG

## 2019-03-12 ENCOUNTER — MYC MEDICAL ADVICE (OUTPATIENT)
Dept: FAMILY MEDICINE | Facility: CLINIC | Age: 44
End: 2019-03-12

## 2019-03-12 NOTE — RESULT ENCOUNTER NOTE
"Hello Clemencia,    This note is to let you know that the final stone analysis came back composed of \"organic material not typically associated with calculi composition.\" Essentially, this means it is inconclusive OR this was not a kidney stone.    Let me know if you have any questions.    Neda HER CNP  "

## 2019-04-09 ENCOUNTER — MYC REFILL (OUTPATIENT)
Dept: FAMILY MEDICINE | Facility: CLINIC | Age: 44
End: 2019-04-09

## 2019-04-09 DIAGNOSIS — Z86.79 H/O SINUS TACHYCARDIA: ICD-10-CM

## 2019-04-09 RX ORDER — METOPROLOL SUCCINATE 50 MG/1
50 TABLET, EXTENDED RELEASE ORAL DAILY
Qty: 90 TABLET | Refills: 3 | Status: CANCELLED | OUTPATIENT
Start: 2019-04-09

## 2019-04-10 RX ORDER — METOPROLOL SUCCINATE 50 MG/1
TABLET, EXTENDED RELEASE ORAL
Qty: 90 TABLET | Refills: 1 | Status: SHIPPED | OUTPATIENT
Start: 2019-04-10 | End: 2019-06-06

## 2019-04-10 NOTE — TELEPHONE ENCOUNTER
"Requested Prescriptions   Pending Prescriptions Disp Refills     metoprolol succinate ER (TOPROL-XL) 50 MG 24 hr tablet [Pharmacy Med Name: METOPROLOL ER SUCCINATE 50MG TABS] 90 tablet 0     Sig: TAKE ONE TABLET BY MOUTH DAILY       Beta-Blockers Protocol Passed - 4/9/2019  5:54 PM        Passed - Blood pressure under 140/90 in past 12 months     BP Readings from Last 3 Encounters:   03/06/19 124/74   02/26/19 128/75   01/11/19 116/76                 Passed - Patient is age 6 or older        Passed - Recent (12 mo) or future (30 days) visit within the authorizing provider's specialty     Patient had office visit in the last 12 months or has a visit in the next 30 days with authorizing provider or within the authorizing provider's specialty.  See \"Patient Info\" tab in inbasket, or \"Choose Columns\" in Meds & Orders section of the refill encounter.              Passed - Medication is active on med list          "

## 2019-05-30 DIAGNOSIS — J44.81 BRONCHIOLITIS OBLITERANS (H): ICD-10-CM

## 2019-05-30 RX ORDER — FLUTICASONE PROPIONATE 50 MCG
SPRAY, SUSPENSION (ML) NASAL
Qty: 16 ML | Refills: 0 | Status: SHIPPED | OUTPATIENT
Start: 2019-05-30 | End: 2021-06-17

## 2019-05-30 NOTE — TELEPHONE ENCOUNTER
Prescription approved per [facility] Refill Protocol.  Yesenia Joseph RN

## 2019-06-05 NOTE — PROGRESS NOTES
"Subjective     Clemencia Rosales is a 43 year old female who presents to clinic today for the following health issues:  Chief Complaint   Patient presents with     Recheck Medication     ativan     LAB REQUEST     tsh     Abdominal Pain     pain on right side, a while         Kidney stone February 2019.  Since that time, Clemencia has continued to have a constant pain in her right upper center abd.  No nausea, vomiting, bowel movement issues.   The pain is tender to the touch.  CT scan abd February 26th showed a normal liver and normal gallbladder.    Tsh:  Managed by Dr. Watson.  Beth she was \"hyperthyroid\" and her levothyroxine was reduced.    Lung issues:  Are stable.    Perimenopausal:  Weight is changing.  Body is changing.  Menstrual cycles are regular, short.  Light cramping.  Breast tenderness with her periods.  She has never had a mammogram.    Ativan:  Taking 1 table daily as needed.  Requesting refills today.      Patient Active Problem List   Diagnosis     Thyrotoxicosis     Other congenital anomaly of lung     Fibromyalgia     CARDIOVASCULAR SCREENING; LDL GOAL LESS THAN 160     Seasonal allergic rhinitis     Postoperative hypothyroidism     RLQ abdominal pain     Polyarthralgia     obliterative bronchiolitis     H/O sinus tachycardia     Temporary low platelet count (H)     Head pain     Pain of right upper extremity     Controlled substance agreement signed     DANIELLE (generalized anxiety disorder)     Ureterolithiasis     Perimenopause     Past Surgical History:   Procedure Laterality Date     THORACOSCOPIC BIOPSY RIB      x4     thyroid radiation      secondary to graves       Social History     Tobacco Use     Smoking status: Never Smoker     Smokeless tobacco: Never Used   Substance Use Topics     Alcohol use: No     Family History   Problem Relation Age of Onset     Diabetes Paternal Aunt      Diabetes Paternal Uncle      Asthma Other      Asthma Other      Asthma Paternal Uncle      Thyroid Disease " "No family hx of         female family members has have thyroid issues         Current Outpatient Medications   Medication Sig Dispense Refill     albuterol (VENTOLIN HFA) 108 (90 Base) MCG/ACT inhaler INHALE 1-2 PUFFS EVERY 4 HOURS AS NEEDED 18 g 11     aspirin 325 MG tablet Take  by mouth daily. Takes 2 tablets as needed       BENADRYL OR AS NEEDED       Cholecalciferol (VITAMIN D PO)        cyclobenzaprine (FLEXERIL) 10 MG tablet Take 0.5-1 tablets (5-10 mg) by mouth 3 times daily as needed for muscle spasms 30 tablet 5     fluticasone (FLONASE) 50 MCG/ACT nasal spray INSTILL TWO SPRAYS INTO BOTH NOSTRILS DAILY 16 mL 0     gabapentin (NEURONTIN) 300 MG capsule Take 1 capsule (300 mg) by mouth 3 times daily 90 capsule 5.rx     hydrocortisone 2.5 % cream   2     levothyroxine (SYNTHROID) 112 MCG tablet Take 100 mcg by mouth daily        LORazepam (ATIVAN) 0.5 MG tablet Take 1 tablet by mouth every 8 hours as needed for anxiety. 30 tablets to last 30 days. 30 tablet 5     metoprolol succinate ER (TOPROL-XL) 50 MG 24 hr tablet Take 1 tablet (50 mg) by mouth daily 90 tablet 3     phenylephrine (CASTRO-SYNEPHRINE) 0.25 % nasal spray Spray 1 spray into both nostrils every 4 hours as needed for congestion       hydrocortisone valerate (WEST-GISELE) 0.2 % ointment Apply sparingly to affected area three times daily as needed. (Patient not taking: Reported on 3/6/2019) 45 g 0     Allergies   Allergen Reactions     Sulfa Drugs Other (See Comments)     Never taken medication however \"was told to put it down just in case\"     Recent Labs   Lab Test 02/26/19  1324 08/15/18  1147 10/10/17  1424  01/08/17  0913  06/30/16  1345  01/04/12  1109   LDL  --   --   --   --   --   --   --   --  133*   HDL  --   --   --   --   --   --   --   --  52   TRIG  --   --   --   --   --   --   --   --  78   ALT 28  --   --   --  22  --  18   < > 16   CR 0.98  --   --   --  1.06*  --  0.95   < > 0.91   GFRESTIMATED 71  --   --   --  57*  --  65   < > " "70   GFRESTBLACK 82  --   --   --  69  --  79   < > 85   POTASSIUM 3.8  --   --   --  4.1  --  4.0   < > 4.4   TSH  --  0.70 0.98   < > 1.34   < >  --    < >  --     < > = values in this interval not displayed.      BP Readings from Last 3 Encounters:   06/06/19 110/73   03/06/19 124/74   02/26/19 128/75    Wt Readings from Last 3 Encounters:   06/06/19 56.7 kg (125 lb)   03/06/19 57.6 kg (127 lb)   01/11/19 55.8 kg (123 lb)               Reviewed and updated as needed this visit by Provider  Tobacco  Allergies  Meds  Problems  Med Hx  Surg Hx  Fam Hx         Review of Systems   ROS COMP: Constitutional, HEENT, cardiovascular, pulmonary, GI, , musculoskeletal, neuro, skin, endocrine and psych systems are negative, except as otherwise noted.      Objective    /73 (BP Location: Left arm, Patient Position: Sitting, Cuff Size: Adult Regular)   Pulse 131   Temp 96.7  F (35.9  C) (Oral)   Resp 16   Ht 1.6 m (5' 3\")   Wt 56.7 kg (125 lb)   LMP 05/15/2019 (Approximate)   SpO2 95%   BMI 22.14 kg/m    Body mass index is 22.14 kg/m .  Physical Exam   GENERAL: healthy, alert and no distress  EYES: Eyes grossly normal to inspection, PERRL and conjunctivae and sclerae normal  HENT: ear canals and TM's normal, nose and mouth without ulcers or lesions  NECK: no adenopathy and thyroid normal to palpation  RESP: lungs clear to auscultation - no rales, rhonchi or wheezes  CV: regular rate and rhythm, normal S1 S2, no S3 or S4, no murmur, click or rub, no peripheral edema and peripheral pulses strong  ABDOMEN: soft, nontender, no hepatosplenomegaly, no masses and bowel sounds normal. No rebound or guarding. No hernia noted.  No abnormalities noted in her mid upper abd in the area of her pain.   MS: no gross musculoskeletal defects noted, no edema. Ambulatory with a steady gait.   SKIN: warm and dry  NEURO: Normal strength and tone, mentation intact and speech normal  PSYCH: mentation appears normal, affect " normal/bright    Diagnostics:  Labs pending.         Assessment & Plan     (E89.0) Postoperative hypothyroidism  (primary encounter diagnosis)  Comment: chronic  Plan: TSH with free T4 reflex        Continue care with Dr. Watson.  I did order her TSH today and she will take that results to Dr. Watson.       (F41.1) DANIELLE (generalized anxiety disorder)  Comment: stable  Plan: LORazepam (ATIVAN) 0.5 MG tablet        I discussed her use of lorazepam.  Use less if possible.      (Z12.31) Screening for breast cancer  Comment: routine  Plan: MA SCREENING DIGITAL BILAT - Future  (s+30)        Routine screening mammogram at earliest convenience.     (J42) obliterative bronchiolitis  Comment: chronic   Plan: no plan of care changes today    (N20.1) Ureterolithiasis  Comment: history of   Plan: continue to drink clear fluids, urinate frequently, etc.  I talked to Clemencia and I reassured her that the mid upper abd pain seems musculoskeletal. No hernia noted, CT scan abd with no kidney or gallbaldder issues.  Recheck prn.     (Z86.79) H/O sinus tachycardia  Comment: improved  Plan: metoprolol succinate ER (TOPROL-XL) 50 MG 24 hr        tablet        Continue toprol.       (N95.1) Perimenopause  Comment:   Plan: I discussed perimenopause.   See AVS.     Regular exercise    Return in about 6 months (around 12/6/2019) for Medication follow up.    ARDEN Coelho [facility]

## 2019-06-06 ENCOUNTER — OFFICE VISIT (OUTPATIENT)
Dept: FAMILY MEDICINE | Facility: CLINIC | Age: 44
End: 2019-06-06
Payer: MEDICARE

## 2019-06-06 VITALS
OXYGEN SATURATION: 95 % | HEIGHT: 63 IN | HEART RATE: 131 BPM | TEMPERATURE: 96.7 F | BODY MASS INDEX: 22.15 KG/M2 | WEIGHT: 125 LBS | RESPIRATION RATE: 16 BRPM | DIASTOLIC BLOOD PRESSURE: 73 MMHG | SYSTOLIC BLOOD PRESSURE: 110 MMHG

## 2019-06-06 DIAGNOSIS — F41.1 GAD (GENERALIZED ANXIETY DISORDER): ICD-10-CM

## 2019-06-06 DIAGNOSIS — J44.81 BRONCHIOLITIS OBLITERANS (H): ICD-10-CM

## 2019-06-06 DIAGNOSIS — N20.1 URETEROLITHIASIS: ICD-10-CM

## 2019-06-06 DIAGNOSIS — E89.0 POSTOPERATIVE HYPOTHYROIDISM: Primary | ICD-10-CM

## 2019-06-06 DIAGNOSIS — Z12.39 SCREENING FOR BREAST CANCER: ICD-10-CM

## 2019-06-06 DIAGNOSIS — M41.9 SCOLIOSIS, UNSPECIFIED SCOLIOSIS TYPE, UNSPECIFIED SPINAL REGION: ICD-10-CM

## 2019-06-06 DIAGNOSIS — Z86.79 H/O SINUS TACHYCARDIA: ICD-10-CM

## 2019-06-06 DIAGNOSIS — N95.1 PERIMENOPAUSE: ICD-10-CM

## 2019-06-06 PROCEDURE — 84443 ASSAY THYROID STIM HORMONE: CPT | Performed by: NURSE PRACTITIONER

## 2019-06-06 PROCEDURE — 99214 OFFICE O/P EST MOD 30 MIN: CPT | Performed by: NURSE PRACTITIONER

## 2019-06-06 PROCEDURE — 36415 COLL VENOUS BLD VENIPUNCTURE: CPT | Performed by: NURSE PRACTITIONER

## 2019-06-06 RX ORDER — LORAZEPAM 0.5 MG/1
TABLET ORAL
Qty: 30 TABLET | Refills: 5 | Status: SHIPPED | OUTPATIENT
Start: 2019-06-06 | End: 2020-01-15

## 2019-06-06 RX ORDER — METOPROLOL SUCCINATE 50 MG/1
50 TABLET, EXTENDED RELEASE ORAL DAILY
Qty: 90 TABLET | Refills: 3 | Status: SHIPPED | OUTPATIENT
Start: 2019-06-06 | End: 2020-08-05

## 2019-06-06 ASSESSMENT — MIFFLIN-ST. JEOR: SCORE: 1196.13

## 2019-06-06 NOTE — PATIENT INSTRUCTIONS
Patient Education     Coping with Perimenopause     Being active in ways you enjoy can help you feel better.     For most women, the symptoms of perimenopause subside after entering menopause, although hot flashes and vaginal dryness can continue after periods have stopped. In the meantime, symptoms can be relieved to help you feel better. Leading a healthy lifestyle can keep you feeling your best. The tips below can help. Doing things you enjoy and spending time with people you love are great ways to keep your spirits up as your body goes through the changes of perimenopause.  Menstrual changes    What happens: As hormone levels go down, periods can become irregular and hard to predict. These changes are often the first sign of perimenopause.    What you can do: Keep pads or tampons on hand in case your period comes unexpectedly. You may also want to talk to your healthcare provider about taking birth control pills to help regulate your periods.  Hot flashes and night sweats    What happens: During a hot flash, you suddenly feel very warm. This may happen often, or just once in a while. Hot flashes at night may interrupt sleep. You may also wake up covered in sweat (night sweats).    What you can do: Wear layers that you can remove. Try all-cotton clothing, sheets, and blankets. At night, open your bedroom window. Keep a glass of water or small fan by your bed in case a hot flash wakes you up.  Bone changes    What happens: Lower levels of estrogen increase your risk of osteoporosis, a disease that weakens the bones. This can cause your bones to break more easily.    What you can do: Eating foods high in calcium and vitamin D helps protect your bones. Your healthcare provider may also suggest taking a calcium supplement. It's also helpful to quit smoking, if you smoke. Don't drink alcohol and get plenty of regular exercise.   Vaginal changes    What happens: In the absence of estrogen, the lining of the vagina can  become thin and dry. This can make sex painful. Vaginal infections may also be more likely.    What you can do: Apply a water-based lubricant before having sex. If you are not using condoms or diaphragms for birth control, you can also use silicone-based lubricants, which don't have to be reapplied as often as water-based lubricants. Over-the-counter vaginal moisturizers can be used anytime, not just when you are having sex. You can also talk with your healthcare provider about using a vaginal cream that contains estrogen.  Mood swings    What happens: As hormone levels decrease, so do chemicals that affect your mood. Hot flashes and trouble sleeping can make mood swings worse. Your sex drive may also decrease.    What you can do: Understand that these feelings are common. Talk to your partner and to other women your age about how you re feeling. Try exercising, which increases levels of mood-boosting chemicals in your body. If your mood swings are affecting your quality of life, talk with your healthcare provider about medicine or other options.  Stay active!  Activity can help you relax and gives you more energy. Exercise also helps keep bones and muscles strong. Staying fit may even give your sex drive a lift. Consider the following:    Weight-bearing activities, such as walking and jogging, help maintain bone density. This can help prevent osteoporosis.    Aerobic activities boost energy by moving oxygen through the body. Walking and jogging are aerobic. You might also try swimming or biking.    Sunlight helps raise levels of brain chemicals that boost your mood. Spending time outdoors can improve your mood, even on a cloudy day. Even a walk around the block can help!  If you re concerned about sex  You may notice that you re not as interested in sex as you used to be. This is very common during perimenopause. Since you re more likely to enjoy sex when you re comfortable, getting your symptoms under control  might help. Talk to your healthcare provider about medicines or other options. And remember that there s more to sex than intercourse. Relax and take your time. Talk to your partner about trying new things to help get you aroused.  Date Last Reviewed: 9/1/2017 2000-2018 [employer]. [address] [place] [address], [place] [address]. All rights reserved. This information is not intended as a substitute for professional medical care. Always follow your healthcare professional's instructions.

## 2019-06-07 LAB — TSH SERPL DL<=0.005 MIU/L-ACNC: 1.99 MU/L (ref 0.4–4)

## 2019-06-07 NOTE — RESULT ENCOUNTER NOTE
Mercy Khanna,    Thisnote is to let you know the results of your recent thyroid level. Please forward this to your endocrinologist.    Neda HER CNP

## 2019-07-11 ENCOUNTER — OFFICE VISIT (OUTPATIENT)
Dept: FAMILY MEDICINE | Facility: CLINIC | Age: 44
End: 2019-07-11
Payer: MEDICARE

## 2019-07-11 ENCOUNTER — ANCILLARY PROCEDURE (OUTPATIENT)
Dept: GENERAL RADIOLOGY | Facility: CLINIC | Age: 44
End: 2019-07-11
Attending: PHYSICIAN ASSISTANT
Payer: MEDICARE

## 2019-07-11 VITALS
DIASTOLIC BLOOD PRESSURE: 81 MMHG | RESPIRATION RATE: 19 BRPM | HEART RATE: 115 BPM | OXYGEN SATURATION: 95 % | BODY MASS INDEX: 22.85 KG/M2 | TEMPERATURE: 98.7 F | SYSTOLIC BLOOD PRESSURE: 119 MMHG | WEIGHT: 129 LBS

## 2019-07-11 DIAGNOSIS — R05.9 COUGH: ICD-10-CM

## 2019-07-11 DIAGNOSIS — M94.0 COSTOCHONDRITIS: ICD-10-CM

## 2019-07-11 DIAGNOSIS — M25.50 MULTIPLE JOINT PAIN: ICD-10-CM

## 2019-07-11 DIAGNOSIS — L56.8 PHOTOSENSITIVITY: ICD-10-CM

## 2019-07-11 DIAGNOSIS — R21 MALAR RASH: Primary | ICD-10-CM

## 2019-07-11 DIAGNOSIS — R07.89 CHEST WALL PAIN: ICD-10-CM

## 2019-07-11 LAB
BASOPHILS # BLD AUTO: 0.1 10E9/L (ref 0–0.2)
BASOPHILS NFR BLD AUTO: 0.9 %
DIFFERENTIAL METHOD BLD: ABNORMAL
EOSINOPHIL # BLD AUTO: 1 10E9/L (ref 0–0.7)
EOSINOPHIL NFR BLD AUTO: 11.5 %
ERYTHROCYTE [DISTWIDTH] IN BLOOD BY AUTOMATED COUNT: 13 % (ref 10–15)
ERYTHROCYTE [SEDIMENTATION RATE] IN BLOOD BY WESTERGREN METHOD: 11 MM/H (ref 0–20)
HCT VFR BLD AUTO: 43.1 % (ref 35–47)
HGB BLD-MCNC: 14 G/DL (ref 11.7–15.7)
LYMPHOCYTES # BLD AUTO: 1.9 10E9/L (ref 0.8–5.3)
LYMPHOCYTES NFR BLD AUTO: 21.7 %
MCH RBC QN AUTO: 29.9 PG (ref 26.5–33)
MCHC RBC AUTO-ENTMCNC: 32.5 G/DL (ref 31.5–36.5)
MCV RBC AUTO: 92 FL (ref 78–100)
MONOCYTES # BLD AUTO: 0.7 10E9/L (ref 0–1.3)
MONOCYTES NFR BLD AUTO: 8.3 %
NEUTROPHILS # BLD AUTO: 5 10E9/L (ref 1.6–8.3)
NEUTROPHILS NFR BLD AUTO: 57.6 %
PLATELET # BLD AUTO: 177 10E9/L (ref 150–450)
RBC # BLD AUTO: 4.69 10E12/L (ref 3.8–5.2)
WBC # BLD AUTO: 8.7 10E9/L (ref 4–11)

## 2019-07-11 PROCEDURE — 36415 COLL VENOUS BLD VENIPUNCTURE: CPT | Performed by: PHYSICIAN ASSISTANT

## 2019-07-11 PROCEDURE — 99214 OFFICE O/P EST MOD 30 MIN: CPT | Performed by: PHYSICIAN ASSISTANT

## 2019-07-11 PROCEDURE — 71046 X-RAY EXAM CHEST 2 VIEWS: CPT

## 2019-07-11 PROCEDURE — 86038 ANTINUCLEAR ANTIBODIES: CPT | Performed by: PHYSICIAN ASSISTANT

## 2019-07-11 PROCEDURE — 85025 COMPLETE CBC W/AUTO DIFF WBC: CPT | Performed by: PHYSICIAN ASSISTANT

## 2019-07-11 PROCEDURE — 85652 RBC SED RATE AUTOMATED: CPT | Performed by: PHYSICIAN ASSISTANT

## 2019-07-11 NOTE — PATIENT INSTRUCTIONS
Follow up with rheumatology for further evaluation  Apply ice/heat to sore area on chest wall.    Patient Education     Chest Wall Pain: Costochondritis    The chest pain that you have had today is caused by costochondritis. This condition is caused by an inflammation of the cartilage joining your ribs to your breastbone. It is not caused by heart or lung problems. Your healthcare team has made sure that the chest pain you feel is not from a life threatening cause of chest pain such as heart attack, collapsed lung, blood clot in the lung, tear in the aorta, or esophageal rupture. The inflammation may have been brought on by a blow to the chest, lifting heavy objects, intense exercise, or an illness that made you cough and sneeze a lot. It often occurs during times of emotional stress. It can be painful, but it is not dangerous. It usually goes away in 1 to 2 weeks. But it may happen again. Rarely, a more serious condition may cause symptoms similar to costochondritis. That s why it s important to watch for the warning signs listed below.  Home care  Follow these guidelines when caring for yourself at home:    If you feel that emotional stress is a cause of your condition, try to figure out the sources of that stress. It may not be obvious. Learn ways to deal with the stress in your life. This can include regular exercise, muscle relaxation, meditation, or simply taking time out for yourself.    You may use acetaminophen, ibuprofen, or naproxen to control pain, unless another pain medicine was prescribed. If you have liver or kidney disease or ever had a stomach ulcer, talk with your healthcare provider before using these medicines.    You can also help ease pain by using a hot, wet compress or heating pad. Use this with or without a medicated skin cream that helps relieves pain.    Do stretching exercise as advised by your provider.    Take any prescribed medicines as directed.  Follow-up care  Follow up with your  healthcare provider, or as advised, if you do not start to get better in the next 2 days.  When to seek medical advice  Call your healthcare provider right away if any of these occur:    A change in the type of pain. Call if it feels different, becomes more serious, lasts longer, or spreads into your shoulder, arm, neck, jaw, or back.    Shortness of breath or pain gets worse when you breathe    Weakness, dizziness, or fainting    Cough with dark-colored sputum (phlegm) or blood    Abdominal pain    Dark red or black stools    Fever of 100.4 F (38 C) or higher, or as directed by your healthcare provider  Date Last Reviewed: 12/1/2016 2000-2018 [employer]. [address] [place] [address], [place] [address]. All rights reserved. This information is not intended as a substitute for professional medical care. Always follow your healthcare professional's instructions.

## 2019-07-11 NOTE — PROGRESS NOTES
Subjective     Clemencia P Bobby is a 43 year old female who presents to clinic today for the following health issues:    HPI     Rash      Duration: few days    Description  Location: front of face  Itching: no    Intensity:  moderate    Accompanying signs and symptoms: red, mildly painful    History (similar episodes/previous evaluation): None. Has history of rosacea but this is different.     Precipitating or alleviating factors:  New exposures:  Was out in the sun before this started.   Recent travel: no    Other: Patient has history of Grave's Disease, fibromyalgia, joint pain primarily affecting her bilateral hands.    Therapies tried and outcome: hydrocortisone cream -  usually effective. Has helped some.     Other: Right lower rib pain/spasm, pain radiates to right arm with sharp pain x 2-3 weeks. Occasionally hurts to take a deep breath. Occasional dry cough. No recent long distance travel. No recent calf pain/swelling.       Patient Active Problem List   Diagnosis     Thyrotoxicosis     Other congenital anomaly of lung     Fibromyalgia     CARDIOVASCULAR SCREENING; LDL GOAL LESS THAN 160     Seasonal allergic rhinitis     Postoperative hypothyroidism     RLQ abdominal pain     Polyarthralgia     obliterative bronchiolitis     H/O sinus tachycardia     Temporary low platelet count (H)     Head pain     Pain of right upper extremity     Controlled substance agreement signed     DANIELLE (generalized anxiety disorder)     Ureterolithiasis     Perimenopause     Scoliosis, unspecified scoliosis type, unspecified spinal region     Past Surgical History:   Procedure Laterality Date     THORACOSCOPIC BIOPSY RIB      x4     thyroid radiation      secondary to graves       Social History     Tobacco Use     Smoking status: Never Smoker     Smokeless tobacco: Never Used   Substance Use Topics     Alcohol use: No     Family History   Problem Relation Age of Onset     Diabetes Paternal Aunt      Diabetes Paternal Uncle       "Asthma Other      Asthma Other      Asthma Paternal Uncle      Thyroid Disease No family hx of         female family members has have thyroid issues         Current Outpatient Medications   Medication Sig Dispense Refill     albuterol (VENTOLIN HFA) 108 (90 Base) MCG/ACT inhaler INHALE 1-2 PUFFS EVERY 4 HOURS AS NEEDED 18 g 11     aspirin 325 MG tablet Take  by mouth daily. Takes 2 tablets as needed       BENADRYL OR AS NEEDED       Cholecalciferol (VITAMIN D PO)        cyclobenzaprine (FLEXERIL) 10 MG tablet Take 0.5-1 tablets (5-10 mg) by mouth 3 times daily as needed for muscle spasms 30 tablet 5     fluticasone (FLONASE) 50 MCG/ACT nasal spray INSTILL TWO SPRAYS INTO BOTH NOSTRILS DAILY 16 mL 0     gabapentin (NEURONTIN) 300 MG capsule Take 1 capsule (300 mg) by mouth 3 times daily 90 capsule 5.rx     hydrocortisone 2.5 % cream   2     hydrocortisone valerate (WEST-GISELE) 0.2 % ointment Apply sparingly to affected area three times daily as needed. 45 g 0     levothyroxine (SYNTHROID) 112 MCG tablet Take 100 mcg by mouth daily        LORazepam (ATIVAN) 0.5 MG tablet Take 1 tablet by mouth every 8 hours as needed for anxiety. 30 tablets to last 30 days. 30 tablet 5     metoprolol succinate ER (TOPROL-XL) 50 MG 24 hr tablet Take 1 tablet (50 mg) by mouth daily 90 tablet 3     phenylephrine (CASTRO-SYNEPHRINE) 0.25 % nasal spray Spray 1 spray into both nostrils every 4 hours as needed for congestion       Allergies   Allergen Reactions     Sulfa Drugs Other (See Comments)     Never taken medication however \"was told to put it down just in case\"       Reviewed and updated as needed this visit by Provider         Review of Systems    ROS: 10 point ROS neg other than the symptoms noted above in the HPI.        Objective    /81   Pulse 115   Temp 98.7  F (37.1  C) (Oral)   Resp 19   Wt 58.5 kg (129 lb)   SpO2 95%   BMI 22.85 kg/m    Body mass index is 22.85 kg/m .  Physical Exam   Constitutional: She is oriented " to person, place, and time. She appears well-developed and well-nourished. No distress.   HENT:   Head: Normocephalic and atraumatic.   Nose: Nose normal.   Mouth/Throat: Oropharynx is clear and moist. No oropharyngeal exudate.   Eyes: Conjunctivae and EOM are normal.   Neck: Normal range of motion.   Cardiovascular: Regular rhythm and normal heart sounds. Tachycardia present.   Pulmonary/Chest: Effort normal and breath sounds normal.   Musculoskeletal: Normal range of motion.   Neurological: She is alert and oriented to person, place, and time.   Skin: Skin is warm and dry. Rash (malar rash) noted.   Psychiatric: She has a normal mood and affect. Her behavior is normal.   Nursing note and vitals reviewed.     Diagnostic Test Results:  Results for orders placed or performed in visit on 07/11/19 (from the past 24 hour(s))   CBC with platelets and differential   Result Value Ref Range    WBC 8.7 4.0 - 11.0 10e9/L    RBC Count 4.69 3.8 - 5.2 10e12/L    Hemoglobin 14.0 11.7 - 15.7 g/dL    Hematocrit 43.1 35.0 - 47.0 %    MCV 92 78 - 100 fl    MCH 29.9 26.5 - 33.0 pg    MCHC 32.5 31.5 - 36.5 g/dL    RDW 13.0 10.0 - 15.0 %    Platelet Count 177 150 - 450 10e9/L    % Neutrophils 57.6 %    % Lymphocytes 21.7 %    % Monocytes 8.3 %    % Eosinophils 11.5 %    % Basophils 0.9 %    Absolute Neutrophil 5.0 1.6 - 8.3 10e9/L    Absolute Lymphocytes 1.9 0.8 - 5.3 10e9/L    Absolute Monocytes 0.7 0.0 - 1.3 10e9/L    Absolute Eosinophils 1.0 (H) 0.0 - 0.7 10e9/L    Absolute Basophils 0.1 0.0 - 0.2 10e9/L    Diff Method Automated Method    ESR: Erythrocyte sedimentation rate   Result Value Ref Range    Sed Rate 11 0 - 20 mm/h     CXR - no significant changes compared to prior study from 1/11/19.         Assessment & Plan   Problem List Items Addressed This Visit     None      Visit Diagnoses     Malar rash    -  Primary    Relevant Orders    CBC with platelets and differential (Completed)    Anti Nuclear Areli IgG by IFA with Reflex  (Completed)    ESR: Erythrocyte sedimentation rate (Completed)    RHEUMATOLOGY REFERRAL    Chest wall pain        Relevant Orders    XR Chest 2 Views    Cough        Relevant Orders    CBC with platelets and differential (Completed)    XR Chest 2 Views    Multiple joint pain        Relevant Orders    RHEUMATOLOGY REFERRAL    Photosensitivity        Relevant Orders    RHEUMATOLOGY REFERRAL    Costochondritis          43 year old female with a history of Grave's Disease, fibromyalgia, joint pain, and interstitial lung disease presenting for evaluation of a rash to her face that began a few days ago after sun exposure. The rash is malar in nature. Also complains of dry cough and right posterior chest wall pain.The chest wall pain is reproducible on exam. Chest X-ray with no significant changes compared to prior study on 1/11/19. CBC unremarkable, ESR normal. ARABELLA pending. Patient to follow up with Rheumatology for further evaluation.     Patient Instructions   Follow up with rheumatology for further evaluation  Apply ice/heat to sore area on chest wall.    Patient Education     Chest Wall Pain: Costochondritis    The chest pain that you have had today is caused by costochondritis. This condition is caused by an inflammation of the cartilage joining your ribs to your breastbone. It is not caused by heart or lung problems. Your healthcare team has made sure that the chest pain you feel is not from a life threatening cause of chest pain such as heart attack, collapsed lung, blood clot in the lung, tear in the aorta, or esophageal rupture. The inflammation may have been brought on by a blow to the chest, lifting heavy objects, intense exercise, or an illness that made you cough and sneeze a lot. It often occurs during times of emotional stress. It can be painful, but it is not dangerous. It usually goes away in 1 to 2 weeks. But it may happen again. Rarely, a more serious condition may cause symptoms similar to  costochondritis. That s why it s important to watch for the warning signs listed below.  Home care  Follow these guidelines when caring for yourself at home:    If you feel that emotional stress is a cause of your condition, try to figure out the sources of that stress. It may not be obvious. Learn ways to deal with the stress in your life. This can include regular exercise, muscle relaxation, meditation, or simply taking time out for yourself.    You may use acetaminophen, ibuprofen, or naproxen to control pain, unless another pain medicine was prescribed. If you have liver or kidney disease or ever had a stomach ulcer, talk with your healthcare provider before using these medicines.    You can also help ease pain by using a hot, wet compress or heating pad. Use this with or without a medicated skin cream that helps relieves pain.    Do stretching exercise as advised by your provider.    Take any prescribed medicines as directed.  Follow-up care  Follow up with your healthcare provider, or as advised, if you do not start to get better in the next 2 days.  When to seek medical advice  Call your healthcare provider right away if any of these occur:    A change in the type of pain. Call if it feels different, becomes more serious, lasts longer, or spreads into your shoulder, arm, neck, jaw, or back.    Shortness of breath or pain gets worse when you breathe    Weakness, dizziness, or fainting    Cough with dark-colored sputum (phlegm) or blood    Abdominal pain    Dark red or black stools    Fever of 100.4 F (38 C) or higher, or as directed by your healthcare provider  Date Last Reviewed: 12/1/2016 2000-2018 [employer] [address] [place] [address], [place] [address]. All rights reserved. This information is not intended as a substitute for professional medical care. Always follow your healthcare professional's instructions.             Return in about 1 month (around 8/11/2019) for rheumatology  evaluation.    Barrington Bates PA-C  [facility]

## 2019-07-12 LAB — ANA SER QL IF: NEGATIVE

## 2019-12-08 ENCOUNTER — HEALTH MAINTENANCE LETTER (OUTPATIENT)
Age: 44
End: 2019-12-08

## 2019-12-16 ENCOUNTER — MYC REFILL (OUTPATIENT)
Dept: FAMILY MEDICINE | Facility: CLINIC | Age: 44
End: 2019-12-16

## 2019-12-16 DIAGNOSIS — M79.7 FIBROMYALGIA: ICD-10-CM

## 2019-12-18 RX ORDER — CYCLOBENZAPRINE HCL 10 MG
5-10 TABLET ORAL 3 TIMES DAILY PRN
Qty: 30 TABLET | Refills: 0 | Status: SHIPPED | OUTPATIENT
Start: 2019-12-18 | End: 2020-01-22

## 2019-12-18 NOTE — TELEPHONE ENCOUNTER
Requested Prescriptions   Pending Prescriptions Disp Refills     cyclobenzaprine (FLEXERIL) 10 MG tablet 30 tablet 5     Sig: Take 0.5-1 tablets (5-10 mg) by mouth 3 times daily as needed for muscle spasms       There is no refill protocol information for this order              Last Written Prescription Date:  6/24/19  Last Fill Quantity: 30,   # refills: 5  Last Office Visit: 7/11/19  Future Office visit:       Routing refill request to provider for review/approval because:  Drug not on the [facility], [facility] or [facility] refill protocol or controlled substance

## 2019-12-18 NOTE — TELEPHONE ENCOUNTER
Please call the patient and have her schedule her routine every 6 months med check appointment with me.  She is requesting cyclobenzaprine at this time but she will certainly be requesting her other medications including lorazepam soon.

## 2019-12-21 DIAGNOSIS — J44.81 BRONCHIOLITIS OBLITERANS (H): ICD-10-CM

## 2019-12-21 NOTE — TELEPHONE ENCOUNTER
"Requested Prescriptions   Pending Prescriptions Disp Refills     albuterol (PROAIR HFA/PROVENTIL HFA/VENTOLIN HFA) 108 (90 Base) MCG/ACT inhaler [Pharmacy Med Name: ALBUTEROL HFA INH (200 PUFFS) 18GM]  Last Written Prescription Date:  12/16/2018  Last Fill Quantity: 18 g,  # refills: 11   Last office visit: 7/11/2019 with prescribing provider:  Marisol   Future Office Visit:   18 g 11     Sig: INHALE 1 TO 2 PUFFS BY MOUTH EVERY 4 HOURS AS NEEDED       Asthma Maintenance Inhalers - Anticholinergics Passed - 12/21/2019 11:48 AM        Passed - Patient is age 12 years or older        Passed - Recent (12 mo) or future (30 days) visit within the authorizing provider's specialty     Patient has had an office visit with the authorizing provider or a provider within the authorizing providers department within the previous 12 mos or has a future within next 30 days. See \"Patient Info\" tab in inbasket, or \"Choose Columns\" in Meds & Orders section of the refill encounter.              Passed - Medication is active on med list         "

## 2019-12-23 DIAGNOSIS — J44.81 BRONCHIOLITIS OBLITERANS (H): ICD-10-CM

## 2019-12-23 RX ORDER — ALBUTEROL SULFATE 90 UG/1
AEROSOL, METERED RESPIRATORY (INHALATION)
Qty: 18 G | Refills: 7 | Status: SHIPPED | OUTPATIENT
Start: 2019-12-23 | End: 2019-12-27

## 2019-12-24 NOTE — TELEPHONE ENCOUNTER
"Requested Prescriptions   Pending Prescriptions Disp Refills     albuterol (PROAIR HFA/PROVENTIL HFA/VENTOLIN HFA) 108 (90 Base) MCG/ACT inhaler [Pharmacy Med Name: ALBUTEROL HFA INH (200 PUFFS) 18GM]  Last Written Prescription Date:  12-23-19  Last Fill Quantity: 18 g,  # refills: 7   Last office visit: 7/11/2019 with prescribing provider:  Barrington Bates     Future Office Visit:   90 g      Sig: INHALE 1 TO 2 PUFFS BY MOUTH EVERY 4 HOURS AS NEEDED       Asthma Maintenance Inhalers - Anticholinergics Passed - 12/23/2019  3:47 PM        Passed - Patient is age 12 years or older        Passed - Recent (12 mo) or future (30 days) visit within the authorizing provider's specialty     Patient has had an office visit with the authorizing provider or a provider within the authorizing providers department within the previous 12 mos or has a future within next 30 days. See \"Patient Info\" tab in inbasket, or \"Choose Columns\" in Meds & Orders section of the refill encounter.            Passed - Medication is active on med list         "

## 2019-12-27 RX ORDER — ALBUTEROL SULFATE 90 UG/1
AEROSOL, METERED RESPIRATORY (INHALATION)
Qty: 3 INHALER | Refills: 1 | Status: SHIPPED | OUTPATIENT
Start: 2019-12-27 | End: 2021-06-28

## 2020-01-15 ENCOUNTER — ANCILLARY PROCEDURE (OUTPATIENT)
Dept: GENERAL RADIOLOGY | Facility: CLINIC | Age: 45
End: 2020-01-15
Attending: NURSE PRACTITIONER
Payer: MEDICARE

## 2020-01-15 ENCOUNTER — OFFICE VISIT (OUTPATIENT)
Dept: FAMILY MEDICINE | Facility: CLINIC | Age: 45
End: 2020-01-15
Payer: MEDICARE

## 2020-01-15 VITALS
HEIGHT: 64 IN | DIASTOLIC BLOOD PRESSURE: 82 MMHG | BODY MASS INDEX: 21.85 KG/M2 | TEMPERATURE: 97.4 F | RESPIRATION RATE: 16 BRPM | SYSTOLIC BLOOD PRESSURE: 108 MMHG | HEART RATE: 139 BPM | OXYGEN SATURATION: 97 % | WEIGHT: 128 LBS

## 2020-01-15 DIAGNOSIS — M25.561 RIGHT KNEE PAIN, UNSPECIFIED CHRONICITY: ICD-10-CM

## 2020-01-15 DIAGNOSIS — M79.7 FIBROMYALGIA: ICD-10-CM

## 2020-01-15 DIAGNOSIS — F41.1 GAD (GENERALIZED ANXIETY DISORDER): Primary | ICD-10-CM

## 2020-01-15 DIAGNOSIS — Z86.79 H/O SINUS TACHYCARDIA: ICD-10-CM

## 2020-01-15 PROCEDURE — 73562 X-RAY EXAM OF KNEE 3: CPT | Mod: RT

## 2020-01-15 PROCEDURE — 99214 OFFICE O/P EST MOD 30 MIN: CPT | Performed by: NURSE PRACTITIONER

## 2020-01-15 RX ORDER — LORAZEPAM 0.5 MG/1
0.5 TABLET ORAL DAILY PRN
Qty: 30 TABLET | Refills: 5 | Status: SHIPPED | OUTPATIENT
Start: 2020-01-15 | End: 2020-08-20

## 2020-01-15 ASSESSMENT — MIFFLIN-ST. JEOR: SCORE: 1220.6

## 2020-01-15 NOTE — PROGRESS NOTES
"Subjective     Clemencia JESSIKA Rosales is a 44 year old female who presents to clinic today for the following health issues:    HPI   Chief Complaint   Patient presents with     Anxiety     Knee Pain     right knee since october, swollen in dec       Right knee pain.  Started in October without apparent injury.  At [place], the pain ramped up.  Walking flat is okay.  \"Trying to hurry isn't good due to pain.\"  Sometimes it feels like her knee is going to give out.  Going down stairs is worse.  Sleeping on her cough because she has trouble getting in and out of her lofted bed.  She does intermittently have swelling/fullness below her right outer knee cap.    Thyroid:  January hyper.  November hypo.  [facility].    Dermatology:  She has had a flare up of a rash on face (on her nose and cheeks) that responded easily to topicals.  Intermittent bruising issues on her hands. These Come and go without trauma.  She has had 4 spots in the past couple of months and she has None today.  Dr. Urbano, [facility].  She is wondering if the flare up on her cheeks and the intermittent spots on her hands are related.    Lorazepam:  Going well.  30 tablets will last 30 days.  Requesting refills today.      Patient Active Problem List   Diagnosis     Thyrotoxicosis     Other congenital anomaly of lung     Fibromyalgia     CARDIOVASCULAR SCREENING; LDL GOAL LESS THAN 160     Seasonal allergic rhinitis     Postoperative hypothyroidism     RLQ abdominal pain     Polyarthralgia     obliterative bronchiolitis     H/O sinus tachycardia     Temporary low platelet count (H)     Head pain     Pain of right upper extremity     Controlled substance agreement signed     DANIELLE (generalized anxiety disorder)     Ureterolithiasis     Perimenopause     Scoliosis, unspecified scoliosis type, unspecified spinal region     Past Surgical History:   Procedure Laterality Date     THORACOSCOPIC BIOPSY RIB      x4     " "thyroid radiation      secondary to graves       Social History     Tobacco Use     Smoking status: Never Smoker     Smokeless tobacco: Never Used   Substance Use Topics     Alcohol use: No     Family History   Problem Relation Age of Onset     Diabetes Paternal Aunt      Diabetes Paternal Uncle      Asthma Other      Asthma Other      Asthma Paternal Uncle      Thyroid Disease No family hx of         female family members has have thyroid issues         Current Outpatient Medications   Medication Sig Dispense Refill     albuterol (PROAIR HFA/PROVENTIL HFA/VENTOLIN HFA) 108 (90 Base) MCG/ACT inhaler INHALE 1 TO 2 PUFFS BY MOUTH EVERY 4 HOURS AS NEEDED 3 Inhaler 1     aspirin 325 MG tablet Take  by mouth daily. Takes 2 tablets as needed       BENADRYL OR AS NEEDED       Cholecalciferol (VITAMIN D PO)        cyclobenzaprine (FLEXERIL) 10 MG tablet Take 0.5-1 tablets (5-10 mg) by mouth 3 times daily as needed for muscle spasms APPOINTMENT REQUIRED PRIOR TO ADD'L REFILLS. 30 tablet 0     fluticasone (FLONASE) 50 MCG/ACT nasal spray INSTILL TWO SPRAYS INTO BOTH NOSTRILS DAILY 16 mL 0     gabapentin (NEURONTIN) 300 MG capsule TAKE ONE CAPSULE BY MOUTH THREE TIMES DAILY 90 capsule 11     hydrocortisone 2.5 % cream   2     hydrocortisone valerate (WEST-GISELE) 0.2 % ointment Apply sparingly to affected area three times daily as needed. 45 g 0     levothyroxine (SYNTHROID) 112 MCG tablet Take 100 mcg by mouth daily        LORazepam (ATIVAN) 0.5 MG tablet Take 1 tablet by mouth every 8 hours as needed for anxiety. 30 tablets to last 30 days. 30 tablet 5     metoprolol succinate ER (TOPROL-XL) 50 MG 24 hr tablet Take 1 tablet (50 mg) by mouth daily 90 tablet 3     phenylephrine (CASTRO-SYNEPHRINE) 0.25 % nasal spray Spray 1 spray into both nostrils every 4 hours as needed for congestion       Allergies   Allergen Reactions     Sulfa Drugs Other (See Comments)     Never taken medication however \"was told to put it down just in case\" " "    Recent Labs   Lab Test 06/06/19  1506 02/26/19  1324 08/15/18  1147  01/08/17  0913  06/30/16  1345  01/04/12  1109   LDL  --   --   --   --   --   --   --   --  133*   HDL  --   --   --   --   --   --   --   --  52   TRIG  --   --   --   --   --   --   --   --  78   ALT  --  28  --   --  22  --  18   < > 16   CR  --  0.98  --   --  1.06*  --  0.95   < > 0.91   GFRESTIMATED  --  71  --   --  57*  --  65   < > 70   GFRESTBLACK  --  82  --   --  69  --  79   < > 85   POTASSIUM  --  3.8  --   --  4.1  --  4.0   < > 4.4   TSH 1.99  --  0.70   < > 1.34   < >  --    < >  --     < > = values in this interval not displayed.      BP Readings from Last 3 Encounters:   01/15/20 108/82   07/11/19 119/81   06/06/19 110/73    Wt Readings from Last 3 Encounters:   01/15/20 58.1 kg (128 lb)   07/11/19 58.5 kg (129 lb)   06/06/19 56.7 kg (125 lb)            Reviewed and updated as needed this visit by Provider         Review of Systems   ROS COMP: Constitutional, HEENT, cardiovascular, pulmonary, GI, , musculoskeletal, neuro, skin, endocrine and psych systems are negative, except as otherwise noted.      Objective    /82 (BP Location: Right arm, Patient Position: Sitting, Cuff Size: Adult Regular)   Pulse 139   Temp 97.4  F (36.3  C) (Tympanic)   Resp 16   Ht 1.626 m (5' 4\")   Wt 58.1 kg (128 lb)   LMP 01/03/2020 (Exact Date)   SpO2 97%   BMI 21.97 kg/m     Body mass index is 21.97 kg/m .  Physical Exam   GENERAL: healthy, alert and no distress  EYES: Eyes grossly normal to inspection, PERRL and conjunctivae and sclerae normal  NECK: no adenopathy and thyroid normal to palpation  RESP: lungs clear to auscultation - no rales, rhonchi or wheezes  CV: regular rate and rhythm, normal S1 S2, no S3 or S4, no murmur, click or rub, no peripheral edema and peripheral pulses strong  ABDOMEN: soft, nontender, no hepatosplenomegaly, no masses and bowel sounds normal. No rebound or guarding.   MS: no gross musculoskeletal " defects noted, no edema. Ambulatory with a steady gait.     Right knee with no redness or swelling. No obvious deformity. Anterior and posterior drawer negative. Varus and valgus stress negative. Passive and active ROM intact. LE strong with resistence. Right DP and PT pulses 2+. CWMS intact distally with brisk capillary refill.    SKIN: warm and dry  NEURO: Normal strength and tone, mentation intact and speech normal  PSYCH: mentation appears normal, affect normal/bright      Diagnostic Test Results:  Labs reviewed in Epic  Right knee x-ray I discussed with the patient in clinic that her right knee x-ray came back Negative for acute changes.        Assessment & Plan     (F41.1) DANIELLE (generalized anxiety disorder)  (primary encounter diagnosis)  Comment: Controlled  Plan: LORazepam (ATIVAN) 0.5 MG tablet        Clemencia's anxiety is controlled and she is been using Ativan approximately once a day for many years for her anxiety.  I did go ahead and refill her medication today.  She is return to clinic to see me in 6 months for recheck/refills, sooner problems.    (M25.561) Right knee pain, unspecified chronicity  Comment: Acute  Plan: ORTHOPEDICS ADULT REFERRAL, CANCELED: XR Knee         Right 1/2 Views        I reassured Clemencia today that her x-ray is normal.  She did schedule follow-up appointment with Ortho to be seen in the next 1 to 2 weeks regarding her knee.  In the meantime she is to avoid aggravating activities, and she is to follow-up sooner if she has any problems.  I also talked her about the [facility] orthopedic walk-in clinic which she will consider if her pain worsens.    (Z86.79) H/O sinus tachycardia  Comment: Continues  Plan: She is to continue her beta-blocker.  According to Clemencia, she took her beta-blocker less than 1 hour prior to arriving to her appointment today.      (M79.7) Fibromyalgia  Comment: Stable  Plan:       See Patient Instructions    Return in about 6 months (around  7/15/2020) for Physical Exam, Medication follow up.    ARDEN Coelho [facility]

## 2020-01-15 NOTE — PATIENT INSTRUCTIONS
Dr. Wagner at [facility]--schedule through the [facility].

## 2020-01-16 NOTE — RESULT ENCOUNTER NOTE
Mercy Khanna,    This note is to confirm to you that the radiologist did not see any new or acute changes in your right knee.  Please follow-up with orthopedist as we discussed.    Neda HER CNP

## 2020-01-22 ENCOUNTER — MYC REFILL (OUTPATIENT)
Dept: FAMILY MEDICINE | Facility: CLINIC | Age: 45
End: 2020-01-22

## 2020-01-22 DIAGNOSIS — M79.7 FIBROMYALGIA: ICD-10-CM

## 2020-01-23 RX ORDER — CYCLOBENZAPRINE HCL 10 MG
5-10 TABLET ORAL 3 TIMES DAILY PRN
Qty: 30 TABLET | Refills: 0 | Status: SHIPPED | OUTPATIENT
Start: 2020-01-23 | End: 2020-02-26

## 2020-01-23 NOTE — TELEPHONE ENCOUNTER
Routing refill request to provider for review/approval because:  Medication not on nursing protocol    Last visit 1/15/2020 Return in about 6 months (around 7/15/2020)    Last refill 12/18/2019 #30 no refills

## 2020-01-29 ENCOUNTER — OFFICE VISIT (OUTPATIENT)
Dept: ORTHOPEDICS | Facility: CLINIC | Age: 45
End: 2020-01-29
Payer: MEDICARE

## 2020-01-29 VITALS — BODY MASS INDEX: 21.85 KG/M2 | WEIGHT: 128 LBS | HEIGHT: 64 IN

## 2020-01-29 DIAGNOSIS — M22.2X1 PATELLOFEMORAL PAIN SYNDROME OF RIGHT KNEE: Primary | ICD-10-CM

## 2020-01-29 PROCEDURE — 99203 OFFICE O/P NEW LOW 30 MIN: CPT | Performed by: PREVENTIVE MEDICINE

## 2020-01-29 ASSESSMENT — MIFFLIN-ST. JEOR: SCORE: 1220.6

## 2020-01-29 NOTE — PROGRESS NOTES
SPORTS & ORTHOPEDIC WALK-IN VISIT 1/29/2020      Reason for visit:     What part of your body is injured / painful?  left knee    What caused the injury /pain? No inciting event     How long ago did your injury occur or pain begin? several months ago    What are your most bothersome symptoms? Pain    How would you characterize your symptom?  aching and sharp    What makes your symptoms better? Rest, Ice and Tylenol    What makes your symptoms worse? Walking, Bending and Squatting    Have you been previously seen for this problem? No    Medical History:    Any recent changes to your medical history? No    Any new medication prescribed since last visit? No    Have you had surgery on this body part before? No    Social History:    Occupation: disabled

## 2020-01-29 NOTE — LETTER
1/29/2020         RE: Clemencia Rosales  [address]  [facility] [address]        Dear Colleague,    Thank you for referring your patient, Clemencia Rosales, to the [facility]. Please see a copy of my visit note below.          SPORTS & ORTHOPEDIC WALK-IN VISIT 1/29/2020      Reason for visit:     What part of your body is injured / painful?  left knee    What caused the injury /pain? No inciting event     How long ago did your injury occur or pain begin? several months ago    What are your most bothersome symptoms? Pain    How would you characterize your symptom?  aching and sharp    What makes your symptoms better? Rest, Ice and Tylenol    What makes your symptoms worse? Walking, Bending and Squatting    Have you been previously seen for this problem? No    Medical History:    Any recent changes to your medical history? No    Any new medication prescribed since last visit? No    Have you had surgery on this body part before? No    Social History:    Occupation: disabled           HISTORY OF PRESENT ILLNESS  Ms. Rosales is a pleasant 44 year old year old female who presents to clinic today with right knee pain  Clemencia explains that she has developed right knee pain and recurrent swelling over the past few months  Location: right knee  Quality:  achy pain    Severity: 6/10 at worst    Duration: several months  Timing: occurs intermittently  Context: occurs while walking incline or going down stairs  Modifying factors:  resting and non-use makes it better, movement and use makes it worse  Associated signs & symptoms: swelling    Additional history: as documented    MEDICAL HISTORY  Patient Active Problem List   Diagnosis     Thyrotoxicosis     Other congenital anomaly of lung     Fibromyalgia     CARDIOVASCULAR SCREENING; LDL GOAL LESS THAN 160     Seasonal allergic rhinitis     Postoperative hypothyroidism     RLQ abdominal pain     Polyarthralgia     obliterative bronchiolitis  "    H/O sinus tachycardia     Temporary low platelet count (H)     Head pain     Pain of right upper extremity     Controlled substance agreement signed     DANIELLE (generalized anxiety disorder)     Ureterolithiasis     Perimenopause     Scoliosis, unspecified scoliosis type, unspecified spinal region       Current Outpatient Medications   Medication Sig Dispense Refill     albuterol (PROAIR HFA/PROVENTIL HFA/VENTOLIN HFA) 108 (90 Base) MCG/ACT inhaler INHALE 1 TO 2 PUFFS BY MOUTH EVERY 4 HOURS AS NEEDED 3 Inhaler 1     aspirin 325 MG tablet Take  by mouth daily. Takes 2 tablets as needed       BENADRYL OR AS NEEDED       Cholecalciferol (VITAMIN D PO)        cyclobenzaprine (FLEXERIL) 10 MG tablet Take 0.5-1 tablets (5-10 mg) by mouth 3 times daily as needed for muscle spasms APPOINTMENT REQUIRED PRIOR TO ADD'L REFILLS. 30 tablet 0     fluticasone (FLONASE) 50 MCG/ACT nasal spray INSTILL TWO SPRAYS INTO BOTH NOSTRILS DAILY 16 mL 0     gabapentin (NEURONTIN) 300 MG capsule TAKE ONE CAPSULE BY MOUTH THREE TIMES DAILY 90 capsule 11     hydrocortisone 2.5 % cream   2     hydrocortisone valerate (WEST-GISELE) 0.2 % ointment Apply sparingly to affected area three times daily as needed. 45 g 0     levothyroxine (SYNTHROID) 112 MCG tablet Take 100 mcg by mouth daily        LORazepam (ATIVAN) 0.5 MG tablet Take 1 tablet (0.5 mg) by mouth daily as needed for anxiety 30 tablets to last 30 days. 30 tablet 5     metoprolol succinate ER (TOPROL-XL) 50 MG 24 hr tablet Take 1 tablet (50 mg) by mouth daily 90 tablet 3     phenylephrine (CASTRO-SYNEPHRINE) 0.25 % nasal spray Spray 1 spray into both nostrils every 4 hours as needed for congestion         Allergies   Allergen Reactions     Sulfa Drugs Other (See Comments)     Never taken medication however \"was told to put it down just in case\"       Family History   Problem Relation Age of Onset     Diabetes Paternal Aunt      Diabetes Paternal Uncle      Asthma Other      Asthma Other      " "Asthma Paternal Uncle      Thyroid Disease No family hx of         female family members has have thyroid issues       Additional medical/Social/Surgical histories reviewed in [facility] and updated as appropriate.     REVIEW OF SYSTEMS (1/29/2020)  10 point ROS of systems including Constitutional, Eyes, Respiratory, Cardiovascular, Gastroenterology, Genitourinary, Integumentary, Musculoskeletal, Psychiatric were all negative except for pertinent positives noted in my HPI.     PHYSICAL EXAM  Vitals:    01/29/20 1424   Weight: 58.1 kg (128 lb)   Height: 1.626 m (5' 4\")     Vital Signs: Ht 1.626 m (5' 4\")   Wt 58.1 kg (128 lb)   LMP 01/03/2020 (Exact Date)   BMI 21.97 kg/m    Patient declined being weighed. Body mass index is 21.97 kg/m .    General  - normal appearance, in no obvious distress  CV  - normal popliteal pulse  Pulm  - normal respiratory pattern, non-labored  Musculoskeletal - right knee  - stance: normal gait without limp, no obvious leg length discrepancy, single-leg squat exhibits knee valgus, internal rotation of the hip, contralateral hip drop  - inspection: no swelling or effusion, normal muscle tone, normal bone and joint alignment, no obvious deformity  - palpation: no joint line tenderness, patellar tendon non-tender, tender medial patellofemoral joint  - ROM: 135 degrees flexion, -5 degrees extension, not painful, NO crepitus with weight-bearing flexion  - strength: 5/5 in flexion, 5/5 in extension  - neuro: no sensory or motor deficit  - special tests:  (-) Lachman  (-) anterior drawer  (-) Ceasar  (-) Thessaly  (-) varus at 0 and 30 degrees flexion  (-) valgus at 0 and 30 degrees flexion  (+) El s compression test  (+) patellar grind  (-) patellar apprehension  Neuro  - no sensory or motor deficit, grossly normal coordination, normal muscle tone  Skin  - no ecchymosis, erythema, warmth, or induration, no obvious rash  Psych  - interactive, appropriate, normal mood and affect    ASSESSMENT & " PLAN  45 yo female with patellofemoral pain  Reviewed xrays: WNL, some patellar tilt  Given HEP  Ice PRN  Topicals PRN  Consider PT  Consider injection  Patellar stabilizer brace      Freddy Kirkland MD, CAM    Again, thank you for allowing me to participate in the care of your patient.        Sincerely,        Freddy Kirkland MD

## 2020-01-29 NOTE — PROGRESS NOTES
HISTORY OF PRESENT ILLNESS  Ms. Rosales is a pleasant 44 year old year old female who presents to clinic today with right knee pain  Clemencia explains that she has developed right knee pain and recurrent swelling over the past few months  Location: right knee  Quality:  achy pain    Severity: 6/10 at worst    Duration: several months  Timing: occurs intermittently  Context: occurs while walking incline or going down stairs  Modifying factors:  resting and non-use makes it better, movement and use makes it worse  Associated signs & symptoms: swelling    Additional history: as documented    MEDICAL HISTORY  Patient Active Problem List   Diagnosis     Thyrotoxicosis     Other congenital anomaly of lung     Fibromyalgia     CARDIOVASCULAR SCREENING; LDL GOAL LESS THAN 160     Seasonal allergic rhinitis     Postoperative hypothyroidism     RLQ abdominal pain     Polyarthralgia     obliterative bronchiolitis     H/O sinus tachycardia     Temporary low platelet count (H)     Head pain     Pain of right upper extremity     Controlled substance agreement signed     DANIELLE (generalized anxiety disorder)     Ureterolithiasis     Perimenopause     Scoliosis, unspecified scoliosis type, unspecified spinal region       Current Outpatient Medications   Medication Sig Dispense Refill     albuterol (PROAIR HFA/PROVENTIL HFA/VENTOLIN HFA) 108 (90 Base) MCG/ACT inhaler INHALE 1 TO 2 PUFFS BY MOUTH EVERY 4 HOURS AS NEEDED 3 Inhaler 1     aspirin 325 MG tablet Take  by mouth daily. Takes 2 tablets as needed       BENADRYL OR AS NEEDED       Cholecalciferol (VITAMIN D PO)        cyclobenzaprine (FLEXERIL) 10 MG tablet Take 0.5-1 tablets (5-10 mg) by mouth 3 times daily as needed for muscle spasms APPOINTMENT REQUIRED PRIOR TO ADD'L REFILLS. 30 tablet 0     fluticasone (FLONASE) 50 MCG/ACT nasal spray INSTILL TWO SPRAYS INTO BOTH NOSTRILS DAILY 16 mL 0     gabapentin (NEURONTIN) 300 MG capsule TAKE ONE CAPSULE BY MOUTH THREE TIMES DAILY 90  "capsule 11     hydrocortisone 2.5 % cream   2     hydrocortisone valerate (WEST-GISELE) 0.2 % ointment Apply sparingly to affected area three times daily as needed. 45 g 0     levothyroxine (SYNTHROID) 112 MCG tablet Take 100 mcg by mouth daily        LORazepam (ATIVAN) 0.5 MG tablet Take 1 tablet (0.5 mg) by mouth daily as needed for anxiety 30 tablets to last 30 days. 30 tablet 5     metoprolol succinate ER (TOPROL-XL) 50 MG 24 hr tablet Take 1 tablet (50 mg) by mouth daily 90 tablet 3     phenylephrine (CASTRO-SYNEPHRINE) 0.25 % nasal spray Spray 1 spray into both nostrils every 4 hours as needed for congestion         Allergies   Allergen Reactions     Sulfa Drugs Other (See Comments)     Never taken medication however \"was told to put it down just in case\"       Family History   Problem Relation Age of Onset     Diabetes Paternal Aunt      Diabetes Paternal Uncle      Asthma Other      Asthma Other      Asthma Paternal Uncle      Thyroid Disease No family hx of         female family members has have thyroid issues       Additional medical/Social/Surgical histories reviewed in [facility] and updated as appropriate.     REVIEW OF SYSTEMS (1/29/2020)  10 point ROS of systems including Constitutional, Eyes, Respiratory, Cardiovascular, Gastroenterology, Genitourinary, Integumentary, Musculoskeletal, Psychiatric were all negative except for pertinent positives noted in my HPI.     PHYSICAL EXAM  Vitals:    01/29/20 1424   Weight: 58.1 kg (128 lb)   Height: 1.626 m (5' 4\")     Vital Signs: Ht 1.626 m (5' 4\")   Wt 58.1 kg (128 lb)   LMP 01/03/2020 (Exact Date)   BMI 21.97 kg/m   Patient declined being weighed. Body mass index is 21.97 kg/m .    General  - normal appearance, in no obvious distress  CV  - normal popliteal pulse  Pulm  - normal respiratory pattern, non-labored  Musculoskeletal - right knee  - stance: normal gait without limp, no obvious leg length discrepancy, single-leg squat exhibits knee valgus, internal " rotation of the hip, contralateral hip drop  - inspection: no swelling or effusion, normal muscle tone, normal bone and joint alignment, no obvious deformity  - palpation: no joint line tenderness, patellar tendon non-tender, tender medial patellofemoral joint  - ROM: 135 degrees flexion, -5 degrees extension, not painful, NO crepitus with weight-bearing flexion  - strength: 5/5 in flexion, 5/5 in extension  - neuro: no sensory or motor deficit  - special tests:  (-) Lachman  (-) anterior drawer  (-) Ceasar  (-) Thessaly  (-) varus at 0 and 30 degrees flexion  (-) valgus at 0 and 30 degrees flexion  (+) El s compression test  (+) patellar grind  (-) patellar apprehension  Neuro  - no sensory or motor deficit, grossly normal coordination, normal muscle tone  Skin  - no ecchymosis, erythema, warmth, or induration, no obvious rash  Psych  - interactive, appropriate, normal mood and affect    ASSESSMENT & PLAN  45 yo female with patellofemoral pain  Reviewed xrays: WNL, some patellar tilt  Given HEP  Ice PRN  Topicals PRN  Consider PT  Consider injection  Patellar stabilizer brace      Freddy Kirkland MD, CAQSM

## 2020-02-12 NOTE — TELEPHONE ENCOUNTER
Routing to nurse team due to pending medication.    Niurka HENDERSON     [facility]

## 2020-02-26 ENCOUNTER — MYC REFILL (OUTPATIENT)
Dept: FAMILY MEDICINE | Facility: CLINIC | Age: 45
End: 2020-02-26

## 2020-02-26 DIAGNOSIS — M79.7 FIBROMYALGIA: ICD-10-CM

## 2020-02-26 NOTE — TELEPHONE ENCOUNTER
Routing refill request to provider for review/approval because:  Drug not on the [facility] refill protocol     Last filled 1/20/2020 #30 no refills    Last visit 1/15/2020

## 2020-03-02 RX ORDER — CYCLOBENZAPRINE HCL 10 MG
5-10 TABLET ORAL 3 TIMES DAILY PRN
Qty: 90 TABLET | Refills: 5 | Status: SHIPPED | OUTPATIENT
Start: 2020-03-02 | End: 2021-05-06

## 2020-03-15 ENCOUNTER — HEALTH MAINTENANCE LETTER (OUTPATIENT)
Age: 45
End: 2020-03-15

## 2020-08-03 DIAGNOSIS — Z86.79 H/O SINUS TACHYCARDIA: ICD-10-CM

## 2020-08-05 RX ORDER — METOPROLOL SUCCINATE 50 MG/1
TABLET, EXTENDED RELEASE ORAL
Qty: 90 TABLET | Refills: 1 | Status: SHIPPED | OUTPATIENT
Start: 2020-08-05 | End: 2020-08-20

## 2020-08-10 ENCOUNTER — MYC REFILL (OUTPATIENT)
Dept: FAMILY MEDICINE | Facility: CLINIC | Age: 45
End: 2020-08-10

## 2020-08-10 DIAGNOSIS — F41.1 GAD (GENERALIZED ANXIETY DISORDER): ICD-10-CM

## 2020-08-10 RX ORDER — LORAZEPAM 0.5 MG/1
0.5 TABLET ORAL DAILY PRN
Qty: 30 TABLET | Refills: 5 | Status: CANCELLED | OUTPATIENT
Start: 2020-08-10

## 2020-08-13 NOTE — TELEPHONE ENCOUNTER
Last Written Prescription Date:  1/15/2020  Last Fill Quantity: 30,   # refills: 5  Last Office Visit: 1/15/220  Future Office visit:       Routing refill request to provider for review/approval because:  Drug not on the [facility], [facility] or [facility] refill protocol or controlled substance    Pt advised previously to make an appointment in July for refills. Sent Mychart notifying her again an appointment is needed. Jewels Dee RN

## 2020-08-20 ENCOUNTER — VIRTUAL VISIT (OUTPATIENT)
Dept: FAMILY MEDICINE | Facility: CLINIC | Age: 45
End: 2020-08-20
Payer: MEDICARE

## 2020-08-20 VITALS — HEIGHT: 64 IN | WEIGHT: 130 LBS | BODY MASS INDEX: 22.2 KG/M2

## 2020-08-20 DIAGNOSIS — Z71.89 ADVICE GIVEN ABOUT COVID-19 VIRUS INFECTION: ICD-10-CM

## 2020-08-20 DIAGNOSIS — F41.1 GAD (GENERALIZED ANXIETY DISORDER): Primary | ICD-10-CM

## 2020-08-20 DIAGNOSIS — R10.11 RUQ ABDOMINAL PAIN: ICD-10-CM

## 2020-08-20 DIAGNOSIS — J44.81 BRONCHIOLITIS OBLITERANS (H): ICD-10-CM

## 2020-08-20 DIAGNOSIS — Z86.79 H/O SINUS TACHYCARDIA: ICD-10-CM

## 2020-08-20 PROCEDURE — 99214 OFFICE O/P EST MOD 30 MIN: CPT | Mod: 95 | Performed by: NURSE PRACTITIONER

## 2020-08-20 RX ORDER — METOPROLOL SUCCINATE 50 MG/1
50 TABLET, EXTENDED RELEASE ORAL DAILY
Qty: 90 TABLET | Refills: 3 | Status: SHIPPED | OUTPATIENT
Start: 2020-08-20 | End: 2021-11-03

## 2020-08-20 RX ORDER — LORAZEPAM 0.5 MG/1
0.5 TABLET ORAL DAILY PRN
Qty: 30 TABLET | Refills: 5 | Status: SHIPPED | OUTPATIENT
Start: 2020-08-20 | End: 2021-02-10

## 2020-08-20 ASSESSMENT — MIFFLIN-ST. JEOR: SCORE: 1229.68

## 2020-08-20 NOTE — PROGRESS NOTES
"Clemencia Rosales is a 44 year old female who is being evaluated via a billable video visit.      The patient has been notified of following:     \"This video visit will be conducted via a call between you and your physician/provider. We have found that certain health care needs can be provided without the need for an in-person physical exam.  This service lets us provide the care you need with a video conversation.  If a prescription is necessary we can send it directly to your pharmacy.  If lab work is needed we can place an order for that and you can then stop by our lab to have the test done at a later time.    Video visits are billed at different rates depending on your insurance coverage.  Please reach out to your insurance provider with any questions.    If during the course of the call the physician/provider feels a video visit is not appropriate, you will not be charged for this service.\"    Patient has given verbal consent for Video visit? Yes  How would you like to obtain your AVS? MyChart  If you are dropped from the video visit, the video invite should be resent to: Text to cell phone: [phone]  Will anyone else be joining your video visit? No    Subjective     Clemencia Rosales is a 44 year old female who presents today via video visit for the following health issues:    HPI        Chief Complaint   Patient presents with     Anxiety     needing refills of lorazepam       Struggling with thyroid issues.  \"i've had problems since January 2019.\"  Currently having hyperthyroid symptoms.  Seeing the endocrology, Dr. Watson.    Lorazepam:  She takes lorazepam daily as needed for panic and anxiety.  She has been out of her lorazepam for a few weeks and she is requesting refills today.  She takes the medication daily, only as prescribed.  She reports she does not overuse this medication.  It works well when she takes it she denies any significant side effects.    Betablocker:  She is taking her " "betablocker.  She is having no problems with the medication.  Needing refills today.    Family history of gallbladder problems.  Clemencia has been having problems with RUQ abd discomfort.  The pain is mild but noticeable.  \"I feel better when I don't eat.\"  She is wondering if she should have imaging done today to check for gallstones.  She denies fever or chills.  She denies vomiting.  She denies changes in her stools.      Video Start Time: 11:23 AM      Review of Systems   Constitutional, HEENT, cardiovascular, pulmonary, GI, , musculoskeletal, neuro, skin, endocrine and psych systems are negative, except as otherwise noted.      Objective           Vitals:  No vitals were obtained today due to virtual visit.    Physical Exam     GENERAL: Healthy, alert and no distress  EYES: Eyes grossly normal to inspection.  No discharge or erythema, or obvious scleral/conjunctival abnormalities.  RESP: No audible wheeze, cough, or visible cyanosis.  No visible retractions or increased work of breathing.    SKIN: Visible skin clear. No significant rash, abnormal pigmentation or lesions.  NEURO: Cranial nerves grossly intact.  Mentation and speech appropriate for age.  PSYCH: Mentation appears normal, affect normal/bright, judgement and insight intact, normal speech and appearance well-groomed.      Diagnostics:  Abdominal ultrasound ordered.        Assessment & Plan     (F41.1) DANIELLE (generalized anxiety disorder)  (primary encounter diagnosis)  Comment: Chronic   plan: LORazepam (ATIVAN) 0.5 MG tablet        For today, I did go ahead and refill her lorazepam.  She is to take her medication, 1 tablet daily only as needed.  She is aware of the limited 30 tablets/month and she is also aware of the need for appointments for refills every 6 months.  In the event that she needs has any worsening symptoms she is to be seen sooner.    (Z86.79) H/O sinus tachycardia  Comment: Controlled  Plan: metoprolol succinate ER (TOPROL-XL) 50 MG " 24 hr        tablet        Continue beta-blockers prescribed.  Refills were sent in.  Continue healthy diet, fluid intake, healthy self cares etc.    (R10.11) RUQ abdominal pain  Comment: Etiology uncertain  Plan: US Abdomen Limited        I discussed with the patient different causes for possible right upper quadrant pain.  It sounds like her symptoms are very mild and with her family history she is requesting an ultrasound.  We discussed and considered labs but waived that today.  She will proceed with an ultrasound at her earliest convenience.  I told her I will let her know through my chart those results and she is let me know if she has any worsening or problems in the meantime.    (J42) obliterative bronchiolitis  Comment: Chronic  Plan: Continue lung care.  The patient states she has not been seen by pulmonologist in quite a while and I did tell her to follow-up with pulmonology for routine follow-up.  She agrees and understands think this is a great idea.  I did tell her that with the COVID-19 pandemic ongoing, it would be very good for her to have a preventative visit with pulmonology rather than wait till she has a COVID infection and a significant illness.  She agrees.    (Z71.89) Advice given about COVID-19 virus infection  Comment: Requested by patient today  Plan: COVID-19 Virus (Coronavirus) Antibody & Titer         Reflex        The patient request to have a COVID antibody test done.  I did go ahead and approve this request.  I did tell her that the COVID team will be in touch with her with the testing protocol and set up.  She is appreciative and she will look forward to the COVID team telephone call.       See Patient Instructions    Return in about 6 months (around 2/20/2021) for Medication follow up.    ARDEN Coelho [facility]      Video-Visit Details    Type of service:  Video Visit    Video End Time:11:38 AM    Originating Location (pt. Location):  Home    Distant Location (provider location):  [facility]     Platform used for Video Visit: Keely

## 2020-09-22 ENCOUNTER — HOSPITAL ENCOUNTER (OUTPATIENT)
Dept: ULTRASOUND IMAGING | Facility: CLINIC | Age: 45
Discharge: HOME | End: 2020-09-22
Attending: NURSE PRACTITIONER | Admitting: NURSE PRACTITIONER
Payer: MEDICARE

## 2020-09-22 DIAGNOSIS — R10.11 RUQ ABDOMINAL PAIN: ICD-10-CM

## 2020-09-22 PROCEDURE — 76705 ECHO EXAM OF ABDOMEN: CPT

## 2020-09-23 NOTE — RESULT ENCOUNTER NOTE
Mercy Khanna,    This note is to let you know that your pelvic ultrasound did come back with a normal report.  There is no evidence of gallbladder issues.    I hope you are doing better.  Let me know if you have any questions.    Neda HER CNP

## 2020-10-13 DIAGNOSIS — G44.219 EPISODIC TENSION-TYPE HEADACHE, NOT INTRACTABLE: ICD-10-CM

## 2020-10-14 NOTE — TELEPHONE ENCOUNTER
Routing refill request to provider for review/approval because:  Drug not on the [facility] refill protocol     Last filled: 9/3/2019 #90 ( 1 tid)  x 11 refills    Last visit: 8/20/2020

## 2020-10-16 RX ORDER — GABAPENTIN 300 MG/1
CAPSULE ORAL
Qty: 90 CAPSULE | Refills: 11 | Status: SHIPPED | OUTPATIENT
Start: 2020-10-16 | End: 2021-11-30

## 2021-01-10 ENCOUNTER — HEALTH MAINTENANCE LETTER (OUTPATIENT)
Age: 46
End: 2021-01-10

## 2021-02-10 ENCOUNTER — OFFICE VISIT (OUTPATIENT)
Dept: FAMILY MEDICINE | Facility: CLINIC | Age: 46
End: 2021-02-10
Payer: MEDICARE

## 2021-02-10 ENCOUNTER — ANCILLARY PROCEDURE (OUTPATIENT)
Dept: GENERAL RADIOLOGY | Facility: CLINIC | Age: 46
End: 2021-02-10
Attending: NURSE PRACTITIONER
Payer: MEDICARE

## 2021-02-10 VITALS
WEIGHT: 129 LBS | HEART RATE: 146 BPM | DIASTOLIC BLOOD PRESSURE: 72 MMHG | TEMPERATURE: 97.8 F | BODY MASS INDEX: 22.02 KG/M2 | SYSTOLIC BLOOD PRESSURE: 108 MMHG | OXYGEN SATURATION: 98 % | HEIGHT: 64 IN | RESPIRATION RATE: 16 BRPM

## 2021-02-10 DIAGNOSIS — J44.81 BRONCHIOLITIS OBLITERANS (H): ICD-10-CM

## 2021-02-10 DIAGNOSIS — R05.9 COUGH: ICD-10-CM

## 2021-02-10 DIAGNOSIS — R05.9 COUGH: Primary | ICD-10-CM

## 2021-02-10 DIAGNOSIS — R00.0 SINUS TACHYCARDIA: ICD-10-CM

## 2021-02-10 DIAGNOSIS — F41.1 GAD (GENERALIZED ANXIETY DISORDER): ICD-10-CM

## 2021-02-10 PROCEDURE — 86769 SARS-COV-2 COVID-19 ANTIBODY: CPT | Performed by: NURSE PRACTITIONER

## 2021-02-10 PROCEDURE — 71046 X-RAY EXAM CHEST 2 VIEWS: CPT | Performed by: RADIOLOGY

## 2021-02-10 PROCEDURE — 99214 OFFICE O/P EST MOD 30 MIN: CPT | Performed by: NURSE PRACTITIONER

## 2021-02-10 PROCEDURE — 36415 COLL VENOUS BLD VENIPUNCTURE: CPT | Performed by: NURSE PRACTITIONER

## 2021-02-10 RX ORDER — LORAZEPAM 0.5 MG/1
0.5 TABLET ORAL DAILY PRN
Qty: 30 TABLET | Refills: 5 | Status: SHIPPED | OUTPATIENT
Start: 2021-02-10 | End: 2021-10-05

## 2021-02-10 ASSESSMENT — MIFFLIN-ST. JEOR: SCORE: 1220.14

## 2021-02-10 NOTE — PROGRESS NOTES
Assessment & Plan     (R05) Cough  (primary encounter diagnosis)  Comment: Chronic/worse  Plan: XR Chest 2 Views, PULMONARY MEDICINE REFERRAL,         COVID-19 Virus (Coronavirus) Antibody & Titer         Reflex, CANCELED: COVID-19 Virus (Coronavirus)         Antibody & Titer Reflex        For today, I talked about her cough, her musculoskeletal/chest wall pain, her history of bronchiolitis, her use of inhalers, etc.  This patient has previously seen by pulmonology but she has not seen a pulmonologist in many years.  I did give her a referral to follow-up with pulmonology ASAP for both chronic and acute changes.    (J42) obliterative bronchiolitis  Comment: Chronic  Plan: XR Chest 2 Views, PULMONARY MEDICINE REFERRAL,         CANCELED: COVID-19 Virus (Coronavirus) Antibody        & Titer Reflex       See above    (R00.0) Sinus tachycardia  Comment: History of  Plan: Clemencia has a longstanding history of sinus tach.  She continues to take her beta-blocker.    (F41.1) DANIELLE (generalized anxiety disorder)  Comment: Chronic  Plan: LORazepam (ATIVAN) 0.5 MG tablet        Clemencia has been on lorazepam for several years for panic and anxiety.  She does take it regularly.  I did talk to her and reminded her of the controlled substance policy, medication appointments are needed for refills etc.  I did refill her medication today and an appointment with me as needed in 6 months, face-to-face appointment, for medication refills.      See Patient Instructions    Return in 6 months (on 8/10/2021), or if symptoms worsen or fail to improve, for Medication follow up.    ARDEN Coelho [facility]    Subjective   Clemencia is a 45 year old who presents for the following health issues:    HPI     Chief Complaint   Patient presents with     Back Pain     right side not sure if its stomach pain or back pain x for a while     Anxiety     Lorazepam refills needed     Thyroid Problem     Chronic  "thyroid issues     Clemencia is in the clinic today with c/o pain in her right lower posterior ribs.  This pain started a few months ago and is not going away.  The pain is more constant, dull but present.  She bought a pulse oximeter to use at home and her pulse oximetry readings have been normal.  She is feeling more short of breath than she would like.  Today she has questions about if the pain is related to her history of lung issues, an acute problem, pneumonia, Covid related.      Thyroid:  Under the care of [facility].  Clemencia had a thyroidectomy.  Since that time, she has had many struggles with her thyroid.  She has difficulty maintaining a normal.  She frequently has questions and concerns about her symptoms and how she is feeling if they are related to her thyroid.  Today She is at 112 mcg of levothyroxine.  \"this is my 5th dose change since 2019.\"  Next planned follow up in 1-2 months.  She does have some frustrations with endocrinology clinic of [facility] but she feels like she is doing better with them that she has done with other clinics.  Previous endocrinology: [facility], [facility],     Lorazepam:  Clemencia has been taking lorazepam fairly regularly for several years for anxiety and panic.  She does not overuse or misuse these.  She follows a controlled substance  Agreement with a face-to-face appointment every 6 months for refills.  She is requesting refills today.    Review of Systems   Constitutional, HEENT, cardiovascular, pulmonary, GI, , musculoskeletal, neuro, skin, endocrine and psych systems are negative, except as otherwise noted.      Objective    /72 (BP Location: Left arm, Patient Position: Sitting, Cuff Size: Adult Regular)   Pulse 146   Temp 97.8  F (36.6  C) (Temporal)   Resp 16   Ht 1.626 m (5' 4\")   Wt 58.5 kg (129 lb)   LMP 02/01/2021 (Exact Date)   SpO2 98%   BMI 22.14 kg/m    Body mass index is 22.14 kg/m .  Physical Exam "   GENERAL: healthy, alert and no distress  NECK: Supple, no adenopathy.    RESP: lungs clear to auscultation - no rales, rhonchi or wheezes  CV: regular rate and rhythm, normal S1 S2, no S3 or S4, no murmur, click or rub, no peripheral edema and peripheral pulses strong  MS: Ambulatory with a steady gait    Diagnostics:  Chest x-ray done today.  No acute changes were seen.  I did discuss this with the patient and I will will send her finalized chest x-ray results, get them.

## 2021-02-10 NOTE — RESULT ENCOUNTER NOTE
[facility] [place],    The finalized chest x-ray results are the same as the preliminary results that we discussed and reviewed when you are in the clinic.  Please follow-up with the pulmonologist as you have planned and let me know if you have any other questions.    Neda HER CNP

## 2021-02-11 ENCOUNTER — TELEPHONE (OUTPATIENT)
Dept: PULMONOLOGY | Facility: CLINIC | Age: 46
End: 2021-02-11

## 2021-02-11 DIAGNOSIS — J84.9 ILD (INTERSTITIAL LUNG DISEASE) (H): Primary | ICD-10-CM

## 2021-02-11 LAB
SARS-COV-2 AB PNL SERPL IA: NEGATIVE
SARS-COV-2 IGG SERPL IA-ACNC: NORMAL

## 2021-02-11 NOTE — TELEPHONE ENCOUNTER
[facility]    Phone Message    May a detailed message be left on voicemail: yes     Reason for Call: Appointment Intake    Referring Provider Name: Neda Damon    Diagnosis and/or Symptoms: Bronchiolitis obliterans  - patient has been seen in [facility] clinic previously, with Dr. Appiah    Action Taken: Message routed to:  [facility] Pulm [facility]    Travel Screening: Not Applicable

## 2021-02-24 NOTE — RESULT ENCOUNTER NOTE
[facility],    This note is confirmed to you that your Covid antibody screening came back negative.  Let me know if you have any questions.    Neda HER CNP

## 2021-02-25 ENCOUNTER — TELEPHONE (OUTPATIENT)
Facility: CLINIC | Age: 46
End: 2021-02-25

## 2021-02-25 NOTE — TELEPHONE ENCOUNTER
Patient has been called several times for an appointment with Dr. Appiah in Pulmonary, has not returned any of my calls, clinic phone number left in patient's answering machine to call.

## 2021-05-08 ENCOUNTER — HEALTH MAINTENANCE LETTER (OUTPATIENT)
Age: 46
End: 2021-05-08

## 2021-09-27 DIAGNOSIS — F41.1 GAD (GENERALIZED ANXIETY DISORDER): ICD-10-CM

## 2021-09-28 ENCOUNTER — MYC MEDICAL ADVICE (OUTPATIENT)
Dept: FAMILY MEDICINE | Facility: CLINIC | Age: 46
End: 2021-09-28

## 2021-09-28 RX ORDER — LORAZEPAM 0.5 MG/1
TABLET ORAL
Qty: 30 TABLET | OUTPATIENT
Start: 2021-09-28

## 2021-09-28 NOTE — TELEPHONE ENCOUNTER
Prescription refused; pt needs a new PCP and follow up.  "[employer] message sent.    COLE Talavera RN  [facility]

## 2021-10-04 NOTE — PROGRESS NOTES
Assessment & Plan     Multiple joint pain  Foot swelling  Unclear etiology - multiple vague associated symptoms, will start with labs and xray and potential to see rheumatology; consider follow up with podiatry pending above as well. Return to clinic with any worsening or changes in symptoms and follow up for routine care.   - EMOTIONAL / BEHAVIORAL ASSESSMENT  - CRP inflammation; Future  - Erythrocyte sedimentation rate auto; Future  - CBC with Platelets & Differential; Future  - Iron & Iron Binding Capacity; Future  - Ferritin; Future  - Vitamin B12; Future  - Rheumatoid factor; Future  - Rheumatology Referral; Future  - Uric acid; Future  - XR Foot Right G/E 3 Views; Future  - XR Ankle Right G/E 3 Views; Future    Postoperative hypothyroidism  Long standing, chronic, controlled-  Will update labs today and option to see different endocrinology discussed with patient with referral placed today.  - Adult Endocrinology Referral; Future  - TSH with free T4 reflex; Future    DANIELLE (generalized anxiety disorder)  Long standing, chronic, controlled-   patient has been on lorazepam for several years for panic and anxiety.  She does not take it regularly since being on BB.  Proper use and risk for abuse discussed with patient at length. Patient to return to clinic when refills needed.   - LORazepam (ATIVAN) 0.5 MG tablet; Take 1 tablet (0.5 mg) by mouth daily as needed for anxiety 30 tablets to last 30 days.    Vitamin D deficiency  - Vitamin D Deficiency; Future    Need for immunization against influenza  - INFLUENZA VACCINE IM >6 MO VALENT IIV4 (ALFURIA/FLUZONE)    Encounter for hepatitis C screening test for low risk patient  - Hepatitis C Screen Reflex to HCV RNA Quant and Genotype; Future    Screening for HIV without presence of risk factors  - HIV Antigen Antibody Combo; Future        Encounter for screening mammogram for breast cancer  - *MA Screening Digital Bilateral; Future    Review of prior external note(s)  from - previous PCP notes  40 minutes spent on the date of the encounter doing chart review, history and exam, documentation and further activities per the note       Patient Instructions   Did you know?      You can schedule a video visit for follow-up appointments as well as future appointments for certain conditions.  Please see the below link.     [email or web address]    If you have not already done so,  I encourage you to sign up for [employer] ([email or web address]).  This will allow you to review your results, securely communicate with a provider, and schedule virtual visits as well.      Return in about 3 months (around 1/5/2022) for Follow up, Routine Visit, or sooner with worsening symptoms.    Milena Moreau PA-C  [facility]    Allie Khanna is a 45 year old who presents for the following health issues     HPI     Anxiety Follow-Up    How are you doing with your anxiety since your last visit? No change    Are you having other symptoms that might be associated with anxiety? No    Have you had a significant life event? No     Are you feeling depressed? No    Do you have any concerns with your use of alcohol or other drugs? No    Social History     Tobacco Use     Smoking status: Never Smoker     Smokeless tobacco: Never Used   Substance Use Topics     Alcohol use: No     Drug use: No     DANIELLE-7 SCORE 1/16/2017 8/14/2017 10/5/2021   Total Score - - 2 (minimal anxiety)   Total Score 6 1 2     PHQ 10/5/2021   PHQ-9 Total Score 5   Q9: Thoughts of better off dead/self-harm past 2 weeks Not at all     Last PHQ-9 10/5/2021   1.  Little interest or pleasure in doing things 0   2.  Feeling down, depressed, or hopeless 0   3.  Trouble falling or staying asleep, or sleeping too much 2   4.  Feeling tired or having little energy 3   5.  Poor appetite or overeating 0   6.  Feeling bad about yourself 0   7.  Trouble concentrating 0   8.   Moving slowly or restless 0   Q9: Thoughts of better off dead/self-harm past 2 weeks 0   PHQ-9 Total Score 5     DANIELLE-7  10/5/2021   1. Feeling nervous, anxious, or on edge 1   2. Not being able to stop or control worrying 0   3. Worrying too much about different things 0   4. Trouble relaxing 1   5. Being so restless that it is hard to sit still 0   6. Becoming easily annoyed or irritable 0   7. Feeling afraid, as if something awful might happen 0   DANIELLE-7 Total Score 2   If you checked any problems, how difficult have they made it for you to do your work, take care of things at home, or get along with other people? -         Musculoskeletal problem/pain  Patient would like to get tested for RA   Onset/Duration: Mid July  Description  Location: foot - either  Joint Swelling: no  Redness: YES (toes)  Pain: YES in the the hands and ankles and also in the hip area  Warmth: no  Intensity:  severe  Progression of Symptoms:  worsening  Accompanying signs and symptoms:   Fevers: no  Numbness/tingling/weakness: YES  History  Trauma to the area: no  Recent illness:  no  Previous similar problem: no  Previous evaluation:  no  Precipitating or alleviating factors:  Aggravating factors include: walking  Therapies tried and outcome: rest/inactivity; gabapentin does help some    Right foot swells especially and makes to hard to walk.  Right hip pain/stiffness after sitting for a while.  History of patellar arthritis.  Decreased energy.  History of fibromyalgia but these feel different.  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    Patient taking women's multivit and Vitamin D 2000 units    Review of Systems   Constitutional, HEENT, cardiovascular, pulmonary, GI, , musculoskeletal, neuro, skin, endocrine and psych systems are negative, except as otherwise noted.      Objective   "  /72 (Patient Position: Sitting, Cuff Size: Adult Regular)   Pulse (!) 141   Temp 97.5  F (36.4  C)   Ht 1.6 m (5' 3\")   Wt 56.7 kg (124 lb 14.4 oz)   LMP 09/06/2021   SpO2 97%   BMI 22.13 kg/m    Body mass index is 22.13 kg/m .  Physical Exam   GENERAL: healthy, alert and no distress  RESP: lungs clear to auscultation - no rales, rhonchi or wheezes  CV: regular rate and rhythm, normal S1 S2, no S3 or S4, no murmur, click or rub, no peripheral edema and peripheral pulses strong  MS: no gross musculoskeletal defects noted, no edema  SKIN: no suspicious lesions or rashes  NEURO: Normal strength and tone, mentation intact and speech normal  PSYCH: mentation appears normal, affect normal/bright            Answers for HPI/ROS submitted by the patient on 10/5/2021  If you checked off any problems, how difficult have these problems made it for you to do your work, take care of things at home, or get along with other people?: Not difficult at all  PHQ9 TOTAL SCORE: 5  DANIELLE 7 TOTAL SCORE: 2      "

## 2021-10-05 ENCOUNTER — ANCILLARY PROCEDURE (OUTPATIENT)
Dept: GENERAL RADIOLOGY | Facility: CLINIC | Age: 46
End: 2021-10-05
Attending: PHYSICIAN ASSISTANT
Payer: MEDICARE

## 2021-10-05 ENCOUNTER — OFFICE VISIT (OUTPATIENT)
Dept: FAMILY MEDICINE | Facility: CLINIC | Age: 46
End: 2021-10-05
Payer: MEDICARE

## 2021-10-05 VITALS
HEART RATE: 141 BPM | WEIGHT: 124.9 LBS | OXYGEN SATURATION: 97 % | HEIGHT: 63 IN | SYSTOLIC BLOOD PRESSURE: 108 MMHG | TEMPERATURE: 97.5 F | DIASTOLIC BLOOD PRESSURE: 72 MMHG | BODY MASS INDEX: 22.13 KG/M2

## 2021-10-05 DIAGNOSIS — M25.50 MULTIPLE JOINT PAIN: Primary | ICD-10-CM

## 2021-10-05 DIAGNOSIS — E55.9 VITAMIN D DEFICIENCY: ICD-10-CM

## 2021-10-05 DIAGNOSIS — M79.89 FOOT SWELLING: ICD-10-CM

## 2021-10-05 DIAGNOSIS — Z12.31 ENCOUNTER FOR SCREENING MAMMOGRAM FOR BREAST CANCER: ICD-10-CM

## 2021-10-05 DIAGNOSIS — E89.0 POSTOPERATIVE HYPOTHYROIDISM: ICD-10-CM

## 2021-10-05 DIAGNOSIS — Z11.4 SCREENING FOR HIV WITHOUT PRESENCE OF RISK FACTORS: ICD-10-CM

## 2021-10-05 DIAGNOSIS — Z23 NEED FOR IMMUNIZATION AGAINST INFLUENZA: ICD-10-CM

## 2021-10-05 DIAGNOSIS — Z11.59 ENCOUNTER FOR HEPATITIS C SCREENING TEST FOR LOW RISK PATIENT: ICD-10-CM

## 2021-10-05 DIAGNOSIS — F41.1 GAD (GENERALIZED ANXIETY DISORDER): ICD-10-CM

## 2021-10-05 LAB
BASOPHILS # BLD AUTO: 0 10E3/UL (ref 0–0.2)
BASOPHILS NFR BLD AUTO: 0 %
EOSINOPHIL # BLD AUTO: 0.2 10E3/UL (ref 0–0.7)
EOSINOPHIL NFR BLD AUTO: 2 %
ERYTHROCYTE [DISTWIDTH] IN BLOOD BY AUTOMATED COUNT: 13.2 % (ref 10–15)
ERYTHROCYTE [SEDIMENTATION RATE] IN BLOOD BY WESTERGREN METHOD: 8 MM/HR (ref 0–20)
HCT VFR BLD AUTO: 43.9 % (ref 35–47)
HGB BLD-MCNC: 14 G/DL (ref 11.7–15.7)
LYMPHOCYTES # BLD AUTO: 1.2 10E3/UL (ref 0.8–5.3)
LYMPHOCYTES NFR BLD AUTO: 16 %
MCH RBC QN AUTO: 30 PG (ref 26.5–33)
MCHC RBC AUTO-ENTMCNC: 31.9 G/DL (ref 31.5–36.5)
MCV RBC AUTO: 94 FL (ref 78–100)
MONOCYTES # BLD AUTO: 0.5 10E3/UL (ref 0–1.3)
MONOCYTES NFR BLD AUTO: 7 %
NEUTROPHILS # BLD AUTO: 5.6 10E3/UL (ref 1.6–8.3)
NEUTROPHILS NFR BLD AUTO: 75 %
PLATELET # BLD AUTO: 173 10E3/UL (ref 150–450)
RBC # BLD AUTO: 4.67 10E6/UL (ref 3.8–5.2)
WBC # BLD AUTO: 7.5 10E3/UL (ref 4–11)

## 2021-10-05 PROCEDURE — 82728 ASSAY OF FERRITIN: CPT | Performed by: PHYSICIAN ASSISTANT

## 2021-10-05 PROCEDURE — 82607 VITAMIN B-12: CPT | Performed by: PHYSICIAN ASSISTANT

## 2021-10-05 PROCEDURE — 84550 ASSAY OF BLOOD/URIC ACID: CPT | Performed by: PHYSICIAN ASSISTANT

## 2021-10-05 PROCEDURE — 85025 COMPLETE CBC W/AUTO DIFF WBC: CPT | Performed by: PHYSICIAN ASSISTANT

## 2021-10-05 PROCEDURE — 73610 X-RAY EXAM OF ANKLE: CPT | Mod: RT | Performed by: RADIOLOGY

## 2021-10-05 PROCEDURE — 85652 RBC SED RATE AUTOMATED: CPT | Performed by: PHYSICIAN ASSISTANT

## 2021-10-05 PROCEDURE — 82306 VITAMIN D 25 HYDROXY: CPT | Performed by: PHYSICIAN ASSISTANT

## 2021-10-05 PROCEDURE — 84443 ASSAY THYROID STIM HORMONE: CPT | Performed by: PHYSICIAN ASSISTANT

## 2021-10-05 PROCEDURE — 86431 RHEUMATOID FACTOR QUANT: CPT | Performed by: PHYSICIAN ASSISTANT

## 2021-10-05 PROCEDURE — 87389 HIV-1 AG W/HIV-1&-2 AB AG IA: CPT | Performed by: PHYSICIAN ASSISTANT

## 2021-10-05 PROCEDURE — 86140 C-REACTIVE PROTEIN: CPT | Performed by: PHYSICIAN ASSISTANT

## 2021-10-05 PROCEDURE — 83550 IRON BINDING TEST: CPT | Performed by: PHYSICIAN ASSISTANT

## 2021-10-05 PROCEDURE — 99215 OFFICE O/P EST HI 40 MIN: CPT | Mod: 25 | Performed by: PHYSICIAN ASSISTANT

## 2021-10-05 PROCEDURE — 86803 HEPATITIS C AB TEST: CPT | Performed by: PHYSICIAN ASSISTANT

## 2021-10-05 PROCEDURE — G0008 ADMIN INFLUENZA VIRUS VAC: HCPCS | Performed by: PHYSICIAN ASSISTANT

## 2021-10-05 PROCEDURE — 36415 COLL VENOUS BLD VENIPUNCTURE: CPT | Performed by: PHYSICIAN ASSISTANT

## 2021-10-05 PROCEDURE — 96127 BRIEF EMOTIONAL/BEHAV ASSMT: CPT | Performed by: PHYSICIAN ASSISTANT

## 2021-10-05 PROCEDURE — 90686 IIV4 VACC NO PRSV 0.5 ML IM: CPT | Performed by: PHYSICIAN ASSISTANT

## 2021-10-05 PROCEDURE — 73630 X-RAY EXAM OF FOOT: CPT | Mod: RT | Performed by: RADIOLOGY

## 2021-10-05 RX ORDER — LORAZEPAM 0.5 MG/1
0.5 TABLET ORAL DAILY PRN
Qty: 30 TABLET | Refills: 5 | Status: SHIPPED | OUTPATIENT
Start: 2021-10-05 | End: 2022-05-31

## 2021-10-05 ASSESSMENT — ANXIETY QUESTIONNAIRES
GAD7 TOTAL SCORE: 2
2. NOT BEING ABLE TO STOP OR CONTROL WORRYING: NOT AT ALL
GAD7 TOTAL SCORE: 2
7. FEELING AFRAID AS IF SOMETHING AWFUL MIGHT HAPPEN: NOT AT ALL
6. BECOMING EASILY ANNOYED OR IRRITABLE: NOT AT ALL
GAD7 TOTAL SCORE: 2
3. WORRYING TOO MUCH ABOUT DIFFERENT THINGS: NOT AT ALL
5. BEING SO RESTLESS THAT IT IS HARD TO SIT STILL: NOT AT ALL
8. IF YOU CHECKED OFF ANY PROBLEMS, HOW DIFFICULT HAVE THESE MADE IT FOR YOU TO DO YOUR WORK, TAKE CARE OF THINGS AT HOME, OR GET ALONG WITH OTHER PEOPLE?: NOT DIFFICULT AT ALL
7. FEELING AFRAID AS IF SOMETHING AWFUL MIGHT HAPPEN: NOT AT ALL
1. FEELING NERVOUS, ANXIOUS, OR ON EDGE: SEVERAL DAYS
4. TROUBLE RELAXING: SEVERAL DAYS

## 2021-10-05 ASSESSMENT — PATIENT HEALTH QUESTIONNAIRE - PHQ9
SUM OF ALL RESPONSES TO PHQ QUESTIONS 1-9: 5
10. IF YOU CHECKED OFF ANY PROBLEMS, HOW DIFFICULT HAVE THESE PROBLEMS MADE IT FOR YOU TO DO YOUR WORK, TAKE CARE OF THINGS AT HOME, OR GET ALONG WITH OTHER PEOPLE: NOT DIFFICULT AT ALL
SUM OF ALL RESPONSES TO PHQ QUESTIONS 1-9: 5

## 2021-10-05 ASSESSMENT — MIFFLIN-ST. JEOR: SCORE: 1185.67

## 2021-10-05 NOTE — NURSING NOTE
Prior to immunization administration, verified patients identity using patient s name and date of birth. Please see Immunization Activity for additional information.     Screening Questionnaire for Adult Immunization    Are you sick today?   No   Do you have allergies to medications, food, a vaccine component or latex?   No   Have you ever had a serious reaction after receiving a vaccination?   No   Do you have a long-term health problem with heart, lung, kidney, or metabolic disease (e.g., diabetes), asthma, a blood disorder, no spleen, complement component deficiency, a cochlear implant, or a spinal fluid leak?  Are you on long-term aspirin therapy?   No   Do you have cancer, leukemia, HIV/AIDS, or any other immune system problem?   No   Do you have a parent, brother, or sister with an immune system problem?   No   In the past 3 months, have you taken medications that affect  your immune system, such as prednisone, other steroids, or anticancer drugs; drugs for the treatment of rheumatoid arthritis, Crohn s disease, or psoriasis; or have you had radiation treatments?   No   Have you had a seizure, or a brain or other nervous system problem?   No   During the past year, have you received a transfusion of blood or blood    products, or been given immune (gamma) globulin or antiviral drug?   No   For women: Are you pregnant or is there a chance you could become       pregnant during the next month?   No   Have you received any vaccinations in the past 4 weeks?   No     Immunization questionnaire answers were all negative.        Per orders of Jc Moreau PA-C, injection of Fluzone given by Asuncion Butts MA. Patient instructed to remain in clinic for 15 minutes afterwards, and to report any adverse reaction to me immediately.       Screening performed by Asuncion Butts MA on 10/5/2021 at 3:31 PM.  Asuncion Butts MA on 10/5/2021 at 3:31 PM

## 2021-10-05 NOTE — PATIENT INSTRUCTIONS
Did you know?      You can schedule a video visit for follow-up appointments as well as future appointments for certain conditions.  Please see the below link.     [email or web address]    If you have not already done so,  I encourage you to sign up for [employer] ([email or web address]).  This will allow you to review your results, securely communicate with a provider, and schedule virtual visits as well.

## 2021-10-06 LAB
CRP SERPL-MCNC: <2.9 MG/L (ref 0–8)
DEPRECATED CALCIDIOL+CALCIFEROL SERPL-MC: 74 UG/L (ref 20–75)
FERRITIN SERPL-MCNC: 27 NG/ML (ref 12–150)
HCV AB SERPL QL IA: NONREACTIVE
HIV 1+2 AB+HIV1 P24 AG SERPL QL IA: NONREACTIVE
IRON SATN MFR SERPL: 24 % (ref 15–46)
IRON SERPL-MCNC: 80 UG/DL (ref 35–180)
RHEUMATOID FACT SER NEPH-ACNC: <7 IU/ML
TIBC SERPL-MCNC: 336 UG/DL (ref 240–430)
TSH SERPL DL<=0.005 MIU/L-ACNC: 1.86 MU/L (ref 0.4–4)
URATE SERPL-MCNC: 2.4 MG/DL (ref 2.6–6)
VIT B12 SERPL-MCNC: 412 PG/ML (ref 193–986)

## 2021-10-06 ASSESSMENT — ANXIETY QUESTIONNAIRES: GAD7 TOTAL SCORE: 2

## 2021-10-07 NOTE — RESULT ENCOUNTER NOTE
"Hello Clemencia  Your attached FOOT xray is within normal limits.  Please contact the office with any questions or concerns.    Milena Allen \"Jc\" SWAPNIL Moreau    "

## 2021-10-07 NOTE — RESULT ENCOUNTER NOTE
"Heltamiko Clemencia  Your attached labs are ALL reassuringly within normal limits.  Your iron and Vitamin D levels are on the low end of normal and supplements of these can often help.  I typically recommend 5916-1454 units of Vitamin D daily and Vitron C (iron supplements) one tab by mouth daily to every other day. This medication is available over the counter. The vit C in the tab enhances the absorption of iron. Please take the medication in between meals and do not combine  with dairy products.  Side effects of iron  are as follows: rash, abdominal pain, constipation, blackish discoloration of tongue and stools.  We can recheck these levels in 3 months.     Please contact the office with any questions or concerns.    Milena Allen \"Jc\" SWAPNIL Moreau    "

## 2021-10-07 NOTE — RESULT ENCOUNTER NOTE
"Hello Clemencia  Your attached ANKLE xray is within normal limits.  Please contact the office with any questions or concerns.    Milena Allen \"Jc\" SWAPNIL Moreau    "

## 2021-10-18 DIAGNOSIS — M79.7 FIBROMYALGIA: ICD-10-CM

## 2021-10-19 RX ORDER — CYCLOBENZAPRINE HCL 10 MG
TABLET ORAL
Qty: 90 TABLET | Refills: 1 | Status: SHIPPED | OUTPATIENT
Start: 2021-10-19 | End: 2022-04-12

## 2021-11-02 DIAGNOSIS — Z86.79 H/O SINUS TACHYCARDIA: ICD-10-CM

## 2021-11-03 RX ORDER — METOPROLOL SUCCINATE 50 MG/1
TABLET, EXTENDED RELEASE ORAL
Qty: 90 TABLET | Refills: 0 | Status: SHIPPED | OUTPATIENT
Start: 2021-11-03 | End: 2022-02-04

## 2021-11-03 NOTE — TELEPHONE ENCOUNTER
1 refill approved. Per 10/45/2021 visit: Return in about 3 months (around 1/5/2022) for Follow up, Routine Visit, or sooner with worsening symptoms.

## 2022-01-17 ENCOUNTER — DOCUMENTATION ONLY (OUTPATIENT)
Dept: LAB | Facility: CLINIC | Age: 47
End: 2022-01-17
Payer: MEDICARE

## 2022-01-17 DIAGNOSIS — E05.80 OTHER THYROTOXICOSIS WITHOUT THYROTOXIC CRISIS OR STORM: Primary | ICD-10-CM

## 2022-01-17 DIAGNOSIS — E89.0 POSTOPERATIVE HYPOTHYROIDISM: ICD-10-CM

## 2022-01-18 ENCOUNTER — LAB (OUTPATIENT)
Dept: LAB | Facility: CLINIC | Age: 47
End: 2022-01-18
Payer: MEDICARE

## 2022-01-18 DIAGNOSIS — E89.0 POSTOPERATIVE HYPOTHYROIDISM: ICD-10-CM

## 2022-01-18 LAB
T3 SERPL-MCNC: 70 NG/DL (ref 60–181)
T3FREE SERPL-MCNC: 2.1 PG/ML (ref 2.3–4.2)
T4 FREE SERPL-MCNC: 1.11 NG/DL (ref 0.76–1.46)
TSH SERPL DL<=0.005 MIU/L-ACNC: 4.2 MU/L (ref 0.4–4)

## 2022-01-18 PROCEDURE — 84439 ASSAY OF FREE THYROXINE: CPT

## 2022-01-18 PROCEDURE — 36415 COLL VENOUS BLD VENIPUNCTURE: CPT

## 2022-01-18 PROCEDURE — 84443 ASSAY THYROID STIM HORMONE: CPT

## 2022-01-18 PROCEDURE — 86038 ANTINUCLEAR ANTIBODIES: CPT

## 2022-01-18 PROCEDURE — 84481 FREE ASSAY (FT-3): CPT

## 2022-01-18 PROCEDURE — 86431 RHEUMATOID FACTOR QUANT: CPT

## 2022-01-19 LAB
ANA SER QL IF: NEGATIVE
RHEUMATOID FACT SER NEPH-ACNC: <7 IU/ML

## 2022-01-20 ENCOUNTER — MYC MEDICAL ADVICE (OUTPATIENT)
Dept: FAMILY MEDICINE | Facility: CLINIC | Age: 47
End: 2022-01-20
Payer: MEDICARE

## 2022-01-20 NOTE — TELEPHONE ENCOUNTER
MP  Please see "[employer] message, response from lab notes:    Jefersontamiko Khanna,  Your attached labs are overall within normal limits, but your TSH level is a little high - how much synthroid are you currently taking? We could increase this a little OR repeat labs again in 6-8 weeks with a lab only appointment before making any changes.  Let me know your thoughts/preferences.     Please contact the office with any questions or concerns.    Thank you,  Anisha Mary RN  [place]

## 2022-01-20 NOTE — RESULT ENCOUNTER NOTE
"Mercy Khanna,  Your attached labs are overall within normal limits, but your TSH level is a little high - how much synthroid are you currently taking? We could increase this a little OR repeat labs again in 6-8 weeks with a lab only appointment before making any changes.  Let me know your thoughts/preferences.    Please contact the office with any questions or concerns.    Milena Allen \"Jc\" SWAPNIL Moreau    "

## 2022-01-21 NOTE — TELEPHONE ENCOUNTER
"Let her endo deal with it then...      Please have patient update her specialist with results.  If they are in [place] system should be able to see results and patient can contact them via phone or Mychart.    Thanks  Milena \"Jc\" SWAPNIL Moreau    "

## 2022-01-26 NOTE — TELEPHONE ENCOUNTER
Patient called and said they never received Results Read her the number we faxed [phone]  Patient just sent via [employer] that the Correct Fax number  Is [phone]  Refaxed Results to [phone]  [facility] Coordinator

## 2022-01-26 NOTE — TELEPHONE ENCOUNTER
Fax Number Confusion  Called Dr Watson's Office and the Correct Fax number is [phone] Faxed Results  [facility] Coordinator

## 2022-02-24 NOTE — PROGRESS NOTES
Assessment & Plan     Postoperative hypothyroidism  H/O sinus tachycardia  Followed by endocrinology, but trial of higher dose metoprolol (up to 100 mg daily) sent to pharmacy to help with associated symptoms. Caution with any lightheadedness/dizziness especially initially.  - metoprolol succinate ER (TOPROL-XL) 50 MG 24 hr tablet; Take 2 tablets (100 mg) by mouth daily  - Hemoglobin A1c; Future    Mitral valve insufficiency, unspecified etiology  Long standing, chronic, was advised to repeat echo in 1 year but does not appear was ever done, will order today.  - Echocardiogram Complete; Future    MRI of brain abnormal  History of demyelination but normal further work up, due to various possible associated symptoms encouraged regular follow up with neurology.  - Adult Neurology  Referral; Future    Bronchiolitis obliterans (H)  Long standing, chronic, but has not seen pulmonology in many years as well; referral updated.  - Adult Pulmonary Medicine Referral; Future    Abnormal finding of blood chemistry, unspecified   - Hemoglobin A1c; Future    High priority for 2019-nCoV vaccine  - COVID-19,PF,PFIZER (12+ Yrs GRAY LABEL)    Review of prior external note(s) from - previous routine notes  50 minutes spent on the date of the encounter doing chart review, history and exam, documentation and further activities per the note       Patient Instructions   Rheumatology specialist -  Representative at [phone].  - rash (pics) and strong family history    Consider follow up with neurology - updated opinion/imaging    Referral ordered for ECHO - patient to call Cardiovascular Clinic to set up: [phone].      Did you know?      You can schedule a video visit for follow-up appointments as well as future appointments for certain conditions.  Please see the below link.     [email or web address]    If you have not already done so,  I encourage you to sign up for [employer]  ([email or web address]).  This will allow you to review your results, securely communicate with a provider, and schedule virtual visits as well.      Return in about 3 months (around 5/25/2022) for Follow up, Routine Visit, or sooner with worsening symptoms.    SWAPNIL Calderon [facility]    Allie Khanna is a 46 year old who presents for the following health issues     History of Present Illness     Reason for visit:  Medication check, booster She exercises with enough effort to increase her heart rate 7 days per week.   She is taking medications regularly.       Hypertension Follow-up      Do you check your blood pressure regularly outside of the clinic? No     Are you following a low salt diet? No    Are your blood pressures ever more than 140 on the top number (systolic) OR more   than 90 on the bottom number (diastolic), for example 140/90? No     Patient taking metoprolol XL 50 mg daily, wondering about possible increase in dosage.    Anxiety Follow-Up    How are you doing with your anxiety since your last visit? No change    Are you having other symptoms that might be associated with anxiety? No    Have you had a significant life event? No     Are you feeling depressed? No    Do you have any concerns with your use of alcohol or other drugs? No  Patient has been on lorazepam (ativan) 0.5 mg for several years for panic and anxiety.  She does not take it regularly since being on BB; prescription for #30 with 5 refills given 10/5/21.    Social History     Tobacco Use     Smoking status: Never Smoker     Smokeless tobacco: Never Used   Substance Use Topics     Alcohol use: No     Drug use: No     DANIELLE-7 SCORE 1/16/2017 8/14/2017 10/5/2021   Total Score - - 2 (minimal anxiety)   Total Score 6 1 2     PHQ 10/5/2021   PHQ-9 Total Score 5   Q9: Thoughts of better off dead/self-harm past 2 weeks Not at all     Last PHQ-9 10/5/2021   1.  Little interest or pleasure  in doing things 0   2.  Feeling down, depressed, or hopeless 0   3.  Trouble falling or staying asleep, or sleeping too much 2   4.  Feeling tired or having little energy 3   5.  Poor appetite or overeating 0   6.  Feeling bad about yourself 0   7.  Trouble concentrating 0   8.  Moving slowly or restless 0   Q9: Thoughts of better off dead/self-harm past 2 weeks 0   PHQ-9 Total Score 5     DANIELLE-7  10/5/2021   1. Feeling nervous, anxious, or on edge 1   2. Not being able to stop or control worrying 0   3. Worrying too much about different things 0   4. Trouble relaxing 1   5. Being so restless that it is hard to sit still 0   6. Becoming easily annoyed or irritable 0   7. Feeling afraid, as if something awful might happen 0   DANIELLE-7 Total Score 2   If you checked any problems, how difficult have they made it for you to do your work, take care of things at home, or get along with other people? -       History of fibromyalgia, per [place].  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  Wondering about trying to see rheumatology, labs within normal limits but strong family history and associated symptoms.  History of abnormal MRI in 2014 with demyelination, should also follow up with neurology.  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    Review of Systems   Constitutional, HEENT, cardiovascular, pulmonary, GI, , musculoskeletal, neuro, skin, endocrine and psych systems are negative, except as otherwise noted.      Objective    /68   Pulse (!) 127   Temp 97.7  F (36.5  C) (Temporal)   Wt 56.7 kg (124 lb 14.4 oz)   LMP 01/31/2022 (Approximate)   SpO2 96%   BMI 22.13 kg/m    Body mass index is 22.13 kg/m .  Physical Exam   GENERAL: healthy, alert and no distress  NECK: no adenopathy, no asymmetry, masses, or scars and thyroid normal to  palpation  RESP: lungs clear to auscultation - no rales, rhonchi or wheezes  CV: regular rate and rhythm, normal S1 S2, no S3 or S4, no murmur, click or rub, no peripheral edema and peripheral pulses strong  MS: no gross musculoskeletal defects noted, no edema  NEURO: Normal strength and tone, mentation intact and speech normal  PSYCH: mentation appears normal, affect normal/bright

## 2022-02-25 ENCOUNTER — OFFICE VISIT (OUTPATIENT)
Dept: FAMILY MEDICINE | Facility: CLINIC | Age: 47
End: 2022-02-25
Payer: MEDICARE

## 2022-02-25 VITALS
OXYGEN SATURATION: 96 % | DIASTOLIC BLOOD PRESSURE: 68 MMHG | SYSTOLIC BLOOD PRESSURE: 112 MMHG | TEMPERATURE: 97.7 F | WEIGHT: 124.9 LBS | BODY MASS INDEX: 22.13 KG/M2 | HEART RATE: 127 BPM

## 2022-02-25 DIAGNOSIS — R90.89 MRI OF BRAIN ABNORMAL: ICD-10-CM

## 2022-02-25 DIAGNOSIS — R79.9 ABNORMAL FINDING OF BLOOD CHEMISTRY, UNSPECIFIED: ICD-10-CM

## 2022-02-25 DIAGNOSIS — I34.0 MITRAL VALVE INSUFFICIENCY, UNSPECIFIED ETIOLOGY: ICD-10-CM

## 2022-02-25 DIAGNOSIS — Z23 HIGH PRIORITY FOR 2019-NCOV VACCINE: ICD-10-CM

## 2022-02-25 DIAGNOSIS — Z86.79 H/O SINUS TACHYCARDIA: ICD-10-CM

## 2022-02-25 DIAGNOSIS — E89.0 POSTOPERATIVE HYPOTHYROIDISM: Primary | ICD-10-CM

## 2022-02-25 DIAGNOSIS — J44.81 BRONCHIOLITIS OBLITERANS (H): ICD-10-CM

## 2022-02-25 LAB — HBA1C MFR BLD: 5.4 % (ref 0–5.6)

## 2022-02-25 PROCEDURE — 91305 COVID-19,PF,PFIZER (12+ YRS): CPT | Performed by: PHYSICIAN ASSISTANT

## 2022-02-25 PROCEDURE — 0054A COVID-19,PF,PFIZER (12+ YRS): CPT | Performed by: PHYSICIAN ASSISTANT

## 2022-02-25 PROCEDURE — 83036 HEMOGLOBIN GLYCOSYLATED A1C: CPT | Performed by: PHYSICIAN ASSISTANT

## 2022-02-25 PROCEDURE — 99215 OFFICE O/P EST HI 40 MIN: CPT | Performed by: PHYSICIAN ASSISTANT

## 2022-02-25 PROCEDURE — 36415 COLL VENOUS BLD VENIPUNCTURE: CPT | Performed by: PHYSICIAN ASSISTANT

## 2022-02-25 RX ORDER — METOPROLOL SUCCINATE 50 MG/1
100 TABLET, EXTENDED RELEASE ORAL DAILY
Qty: 180 TABLET | Refills: 3 | Status: SHIPPED | OUTPATIENT
Start: 2022-02-25 | End: 2023-03-21

## 2022-02-25 RX ORDER — LEVOTHYROXINE SODIUM 100 MCG
100 TABLET ORAL DAILY
COMMUNITY
Start: 2022-01-03 | End: 2022-02-25 | Stop reason: DRUGHIGH

## 2022-02-25 RX ORDER — METOPROLOL SUCCINATE 50 MG/1
50 TABLET, EXTENDED RELEASE ORAL DAILY
Qty: 90 TABLET | Refills: 3 | Status: CANCELLED | OUTPATIENT
Start: 2022-02-25

## 2022-02-25 NOTE — PATIENT INSTRUCTIONS
Rheumatology specialist -  Representative at [phone].  - rash (pics) and strong family history    Consider follow up with neurology - updated opinion/imaging    Referral ordered for ECHO - patient to call Cardiovascular Clinic to set up: [phone].      Did you know?      You can schedule a video visit for follow-up appointments as well as future appointments for certain conditions.  Please see the below link.     [email or web address]    If you have not already done so,  I encourage you to sign up for 3 day [employer] ([email or web address]).  This will allow you to review your results, securely communicate with a provider, and schedule virtual visits as well.

## 2022-02-28 NOTE — RESULT ENCOUNTER NOTE
"Mercy Khanna  Your attached lab is within normal limits/negative for diabetes.  Please contact the office with any questions or concerns.    Milena Allen \"Jc\" SWAPNIL Moreau    "

## 2022-04-12 DIAGNOSIS — M79.7 FIBROMYALGIA: ICD-10-CM

## 2022-04-12 RX ORDER — CYCLOBENZAPRINE HCL 10 MG
TABLET ORAL
Qty: 90 TABLET | Refills: 1 | Status: SHIPPED | OUTPATIENT
Start: 2022-04-12 | End: 2022-09-19

## 2022-04-12 NOTE — TELEPHONE ENCOUNTER
Routing refill request to provider for review/approval because:  Drug not on the [facility] refill protocol       Last Written Prescription Date:  10/19/21  Last Fill Quantity: 90,  # refills: 1   Last office visit: 2/10/2021 with prescribing provider:     Future Office Visit:    Lorraine Ramos RN  [facility]

## 2022-05-30 DIAGNOSIS — F41.1 GAD (GENERALIZED ANXIETY DISORDER): ICD-10-CM

## 2022-05-31 RX ORDER — LORAZEPAM 0.5 MG/1
TABLET ORAL
Qty: 30 TABLET | Refills: 0 | Status: SHIPPED | OUTPATIENT
Start: 2022-05-31 | End: 2024-04-18

## 2022-05-31 NOTE — TELEPHONE ENCOUNTER
Routing refill request to provider for review/approval because:  Drug not on the [facility] refill protocol   Kandy OBREGON RN

## 2022-06-04 ENCOUNTER — HEALTH MAINTENANCE LETTER (OUTPATIENT)
Age: 47
End: 2022-06-04

## 2022-06-29 ENCOUNTER — MYC MEDICAL ADVICE (OUTPATIENT)
Dept: FAMILY MEDICINE | Facility: CLINIC | Age: 47
End: 2022-06-29

## 2022-06-29 DIAGNOSIS — E89.0 POSTOPERATIVE HYPOTHYROIDISM: ICD-10-CM

## 2022-06-29 DIAGNOSIS — M79.7 FIBROMYALGIA: Primary | ICD-10-CM

## 2022-06-29 DIAGNOSIS — Z00.00 ROUTINE HISTORY AND PHYSICAL EXAMINATION OF ADULT: ICD-10-CM

## 2022-06-29 DIAGNOSIS — Z13.220 SCREENING CHOLESTEROL LEVEL: ICD-10-CM

## 2022-06-29 DIAGNOSIS — M25.50 MULTIPLE JOINT PAIN: ICD-10-CM

## 2022-06-29 NOTE — TELEPHONE ENCOUNTER
"Sounds good, thanks, signed.  Can make a lab only appt to start.    Thanks  Milena \"Jc\" SWAPNIL Moreau   "

## 2022-06-29 NOTE — TELEPHONE ENCOUNTER
MP  Please see mychart message  Pended labs and added a few more since pt is due for screening and physical.  Add any others?    Please approve if appropriate  Anisha Mary RN           [place] AMBULATORY ENCOUNTER  FAMILY PRACTICE OFFICE VISIT    CHIEF COMPLAINT:    Chief Complaint   Patient presents with   • Shoulder Pain     Right shoulder pain, radiates to hands, fingertips- going to therapy       SUBJECTIVE:  Reddy Engel is a 68 year old male who presented requesting evaluation for adhesive capsulitis.  The patient was diagnosed with this condition in December of 2019. They were seen in the emergency room, and was placed on a Medrol Dosepak.  This actually seemed to help significantly, but the effect only lasted 2 weeks.  Physical therapy seems to help for a few hours, then symptoms intensify once again.  He has had difficulty with numerous activities of daily living, and is essentially unable to abduct his right shoulder greater than 90°.  He has also described some numbness in his right hand which seems to radiate from his wrist up into his forearm.  He states the pain in the shoulder seems to radiate from his neck all the way down into and through his shoulder and down into the right forearm area.  There has been no redness or swelling noted.  No injury.  He is essentially ambidextrous.  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  Cracking and popping is noted in the right shoulder as well.  He also describes significant weakness in with the right shoulder.           OBJECTIVE:  PROBLEM LIST:   Patient Active Problem List   Diagnosis   • Lumbago   • Elevated cholesterol   • Gout, unspecified   • Essential hypertension, benign   • Other vitreous opacities   • Posterior vitreous detachment of both eyes   • Corneal scars, both eyes   • Allergic conjunctivitis   • Hollenhorst plaque, right eye   • Dyslipidemia   • PVD (peripheral vascular disease) (CMS/HCC)   • Tobacco dependence   • Neck mass   • Warthin's tumor   • Lumbosacral radiculopathy at S1   • Polyneuropathy   • Corneal scar, right eye       PAST HISTORIES:   I have  reviewed the past medical history, family history, social history, medications and allergies listed in the medical record as obtained by my nursing staff and support staff and agree with their documentation.  ALLERGIES:   Allergen Reactions   • Simvastatin Nausea & Vomiting     Myalgia      Current Outpatient Medications   Medication Sig Dispense Refill   • atorvastatin (LIPITOR) 10 MG tablet TAKE 1 TABLET BY MOUTH DAILY 90 tablet 0   • lisinopril-hydroCHLOROthiazide (PRINZIDE,ZESTORETIC) 20-12.5 MG per tablet TAKE 2 TABLETS BY MOUTH DAILY 180 tablet 0   • sildenafil (REVATIO) 20 MG tablet Take 2 - 5 tablets as needed 1 hour for erectile dysfunction 50 tablet 2   • cannabidiol (CBD) oil Indications: CBD gummies     • allopurinol (ZYLOPRIM) 100 MG tablet Take 2 tablets by mouth daily. 180 tablet 0   • naproxen (NAPROSYN) 500 MG tablet Take 1 tablet by mouth 2 times daily as needed for Pain (take with food). 60 tablet 1   • HYDROcodone-acetaminophen (NORCO) 5-325 MG per tablet Take 1 tablet by mouth every 6 hours as needed. 28 tablet 0   • nitroGLYcerin (NITROSTAT) 0.3 MG sublingual tablet Place 1 tablet under the tongue every 5 minutes as needed for Chest pain. 25 tablet 1   • aspirin 81 MG tablet Take 81 mg by mouth daily.     • Multiple Vitamins-Minerals (DAILY MULTIPLE VITAMINS/) TABS Take 1 tablet by mouth daily.     • Turmeric 400 MG Cap        No current facility-administered medications for this visit.      Past Medical History:   Diagnosis Date   • Allergic conjunctivitis 9/25/2013   • Arthritis    • Chronic pain    • Corneal scars, both eyes 2/28/2013   • Difficult intubation    • Erectile dysfunction    • Failed moderate sedation during procedure     anesthesia told him extremely strong gag reflex    • Fracture    • Gastroesophageal reflux disease    • Gout    • HLD (hyperlipidemia)    • Hollenhorst plaque, right eye 4/2/2014   • HTN (hypertension)    • Myalgia    • Obesity    • Other vitreous  opacities 2/28/2013   • Posterior vitreous detachment of both eyes 2/28/2013   • RAD (reactive airway disease)     childhood    • Tobacco dependence 9/7/2017   • Wears glasses      Past Surgical History:   Procedure Laterality Date   • Back surgery      fusion lumbar    • Cataract extrac w/ intraocular lens imp&ant vit,bilaterl  06/05/2012    x2   • Cholecystectomy  03/01/1999   • Colonoscopy w/ polypectomy  09/08/2005   • Eye surgery      cataract bilaterally    • Hernia repair      x3   • Ir guided biopsy Right 10/19/2017    PAROTID   • Laminectomy     • Occult blood test tube  12/02/2011   • Ptca     • Removal gallbladder     • Spine surgery     • Tumor removal  Decemebr 2017     Social History     Tobacco Use   • Smoking status: Current Some Day Smoker     Packs/day: 0.00     Years: 40.00     Pack years: 0.00     Types: Cigarettes   • Smokeless tobacco: Former User     Types: Chew   • Tobacco comment: socially - 3x/month   Substance Use Topics   • Alcohol use: Yes     Alcohol/week: 2.0 standard drinks     Types: 2 Shots of liquor per week     Comment: socially   • Drug use: No     Social History     Tobacco Use   Smoking Status Current Some Day Smoker   • Packs/day: 0.00   • Years: 40.00   • Pack years: 0.00   • Types: Cigarettes   Smokeless Tobacco Former User   • Types: Chew   Tobacco Comment    socially - 3x/month     Family History   Problem Relation Age of Onset   • Stroke Mother    • Neuropathy Mother         left foot   • Heart disease Father         CHF    • Hypertension Father    • Diabetes Brother         NIDDM   • Hypertension Brother        PHYSICAL EXAM:   Visit Vitals  /80   Pulse 72   Resp 16   Ht 6' 3\" (1.905 m)   Wt 116.1 kg   BMI 32.00 kg/m²       General Appearance:  Alert, cooperative, no distress  Musculoskeletal:  Examination of the right shoulder notes decreased range of motion with abduction and flexion, both are limited to about 90°.  Good range of motion with extension noted.   Negative drop-arm test.  Weakness noted with forced abduction, right shoulder.  Mild tenderness appreciated in the lower cervical spinal area, Spurling's test was negative.  Palpation does elicit some tenderness in the infraspinatus muscle, rhomboid muscle, posterior deltoid and anterior deltoid muscle areas.  Audible popping is noted with range of motion testing.  Neurologic:  Median radial and ulnar nerve functions do appear intact to the right hand.  Tinel's test is positive at the wrist, albeit slightly with some paresthesias being created in the right hand.  Also, Tinel's is positive at the elbow.  This seem to reproduce some of the paresthesias in the right upper arm area.          ASSESSMENT:   Reddy was seen today for shoulder pain.    Diagnoses and all orders for this visit:    Adhesive capsulitis of right shoulder  -     SERVICE TO [facility]         PLAN:     In regards to the adhesive capsulitis, we did refer to Sports Medicine for possible corticosteroid injection.  Regards to the possible cubital tunnel, we recommended adjusting his sleeping position with his right elbow.  The patient typically sleeps with his elbows flexed.  He was going to try a towel wrap around the elbow while sleeping to see this will help.  He was given an informational sheet on adhesive capsulitis.  We talked about the potential long-term duration of this condition as well.    No follow-ups on file.    Instructions provided as documented in the after visit summary.  Varun Keenan PA-C  Supervising Physician: Dr Madhu Nielsen

## 2022-07-05 ENCOUNTER — LAB (OUTPATIENT)
Dept: LAB | Facility: CLINIC | Age: 47
End: 2022-07-05
Payer: MEDICARE

## 2022-07-05 DIAGNOSIS — Z00.00 ROUTINE HISTORY AND PHYSICAL EXAMINATION OF ADULT: ICD-10-CM

## 2022-07-05 DIAGNOSIS — M25.50 MULTIPLE JOINT PAIN: ICD-10-CM

## 2022-07-05 DIAGNOSIS — M79.7 FIBROMYALGIA: ICD-10-CM

## 2022-07-05 DIAGNOSIS — Z13.220 SCREENING CHOLESTEROL LEVEL: ICD-10-CM

## 2022-07-05 DIAGNOSIS — E89.0 POSTOPERATIVE HYPOTHYROIDISM: ICD-10-CM

## 2022-07-05 LAB
BASOPHILS # BLD AUTO: 0.1 10E3/UL (ref 0–0.2)
BASOPHILS NFR BLD AUTO: 1 %
EOSINOPHIL # BLD AUTO: 0.4 10E3/UL (ref 0–0.7)
EOSINOPHIL NFR BLD AUTO: 4 %
ERYTHROCYTE [DISTWIDTH] IN BLOOD BY AUTOMATED COUNT: 13.4 % (ref 10–15)
HCT VFR BLD AUTO: 43.6 % (ref 35–47)
HGB BLD-MCNC: 14.5 G/DL (ref 11.7–15.7)
LYMPHOCYTES # BLD AUTO: 2.7 10E3/UL (ref 0.8–5.3)
LYMPHOCYTES NFR BLD AUTO: 30 %
MCH RBC QN AUTO: 30.4 PG (ref 26.5–33)
MCHC RBC AUTO-ENTMCNC: 33.3 G/DL (ref 31.5–36.5)
MCV RBC AUTO: 91 FL (ref 78–100)
MONOCYTES # BLD AUTO: 0.9 10E3/UL (ref 0–1.3)
MONOCYTES NFR BLD AUTO: 9 %
NEUTROPHILS # BLD AUTO: 5 10E3/UL (ref 1.6–8.3)
NEUTROPHILS NFR BLD AUTO: 56 %
PLATELET # BLD AUTO: 176 10E3/UL (ref 150–450)
RBC # BLD AUTO: 4.77 10E6/UL (ref 3.8–5.2)
WBC # BLD AUTO: 9 10E3/UL (ref 4–11)

## 2022-07-05 PROCEDURE — 36415 COLL VENOUS BLD VENIPUNCTURE: CPT

## 2022-07-05 PROCEDURE — 80050 GENERAL HEALTH PANEL: CPT

## 2022-07-05 PROCEDURE — 80061 LIPID PANEL: CPT

## 2022-07-05 PROCEDURE — 86038 ANTINUCLEAR ANTIBODIES: CPT

## 2022-07-05 PROCEDURE — 86431 RHEUMATOID FACTOR QUANT: CPT

## 2022-07-05 PROCEDURE — 84439 ASSAY OF FREE THYROXINE: CPT

## 2022-07-06 ENCOUNTER — MYC MEDICAL ADVICE (OUTPATIENT)
Dept: FAMILY MEDICINE | Facility: CLINIC | Age: 47
End: 2022-07-06

## 2022-07-06 LAB
ALBUMIN SERPL-MCNC: 4.1 G/DL (ref 3.4–5)
ALP SERPL-CCNC: 56 U/L (ref 40–150)
ALT SERPL W P-5'-P-CCNC: 26 U/L (ref 0–50)
ANA SER QL IF: NEGATIVE
ANION GAP SERPL CALCULATED.3IONS-SCNC: 7 MMOL/L (ref 3–14)
AST SERPL W P-5'-P-CCNC: 23 U/L (ref 0–45)
BILIRUB SERPL-MCNC: 0.5 MG/DL (ref 0.2–1.3)
BUN SERPL-MCNC: 16 MG/DL (ref 7–30)
CALCIUM SERPL-MCNC: 8 MG/DL (ref 8.5–10.1)
CHLORIDE BLD-SCNC: 107 MMOL/L (ref 94–109)
CHOLEST SERPL-MCNC: 214 MG/DL
CO2 SERPL-SCNC: 25 MMOL/L (ref 20–32)
CREAT SERPL-MCNC: 0.83 MG/DL (ref 0.52–1.04)
FASTING STATUS PATIENT QL REPORTED: YES
GFR SERPL CREATININE-BSD FRML MDRD: 88 ML/MIN/1.73M2
GLUCOSE BLD-MCNC: 115 MG/DL (ref 70–99)
HDLC SERPL-MCNC: 42 MG/DL
LDLC SERPL CALC-MCNC: 140 MG/DL
NONHDLC SERPL-MCNC: 172 MG/DL
POTASSIUM BLD-SCNC: 4.4 MMOL/L (ref 3.4–5.3)
PROT SERPL-MCNC: 7.1 G/DL (ref 6.8–8.8)
RHEUMATOID FACT SER NEPH-ACNC: <6 IU/ML
SODIUM SERPL-SCNC: 139 MMOL/L (ref 133–144)
T4 FREE SERPL-MCNC: 1.34 NG/DL (ref 0.76–1.46)
TRIGL SERPL-MCNC: 162 MG/DL
TSH SERPL DL<=0.005 MIU/L-ACNC: 0.26 MU/L (ref 0.4–4)

## 2022-07-07 NOTE — RESULT ENCOUNTER NOTE
"[facility]  Your attached \"inflammation markers\" are within normal limits, but your TSH level is now too low - meaning you're taking too much synthroid. We may just want to repeat these in 6-8 weeks with a lab only appointment before making further dose adjustments though. I will place this order and you can make an appointment at your convenience.  Your cholesterol was a bit high, but no need for further medicine at this time. Continue to work on healthy diet and exercise.    Please contact the office with any questions or concerns.    Milena Allen \"Jc\" SWAPNIL Moreau    "

## 2022-07-07 NOTE — TELEPHONE ENCOUNTER
TC,    Please see [employer] message.  Please fax thyroid lab results to Dr. Watson.    Thank you,  Selene Stewart RN

## 2022-07-18 DIAGNOSIS — J44.81 BRONCHIOLITIS OBLITERANS (H): ICD-10-CM

## 2022-07-19 DIAGNOSIS — F41.1 GAD (GENERALIZED ANXIETY DISORDER): ICD-10-CM

## 2022-07-20 RX ORDER — ALBUTEROL SULFATE 90 UG/1
1-2 AEROSOL, METERED RESPIRATORY (INHALATION) EVERY 4 HOURS PRN
Qty: 18 G | Refills: 0 | Status: SHIPPED | OUTPATIENT
Start: 2022-07-20 | End: 2022-09-19

## 2022-07-21 RX ORDER — LORAZEPAM 0.5 MG/1
TABLET ORAL
Qty: 1 TABLET | Refills: 0 | OUTPATIENT
Start: 2022-07-21

## 2022-07-21 NOTE — TELEPHONE ENCOUNTER
[employer] message sent to patient.  Pharmacy informed patient needs appointment.  Selene Stewart RN

## 2022-08-20 DIAGNOSIS — G44.219 EPISODIC TENSION-TYPE HEADACHE, NOT INTRACTABLE: ICD-10-CM

## 2022-08-22 RX ORDER — GABAPENTIN 300 MG/1
CAPSULE ORAL
Qty: 90 CAPSULE | Refills: 3 | Status: SHIPPED | OUTPATIENT
Start: 2022-08-22 | End: 2023-09-20

## 2022-08-22 NOTE — TELEPHONE ENCOUNTER
Routing refill request to provider for review/approval because:  Drug not on the [facility] refill protocol   Ngozi ALEXANDRA RN

## 2022-09-01 NOTE — TELEPHONE ENCOUNTER
Message from MyChart:  Original authorizing provider: ARDEN Coelho CNP would like a refill of the following medications:  metoprolol (TOPROL-XL) 50 MG 24 hr tablet [ARDEN Coelho CNP]    Preferred pharmacy: [facility] [address] [place] [address] & [address]    Comment:     Admission

## 2022-09-16 DIAGNOSIS — J44.81 BRONCHIOLITIS OBLITERANS (H): ICD-10-CM

## 2022-09-16 DIAGNOSIS — M79.7 FIBROMYALGIA: ICD-10-CM

## 2022-09-19 DIAGNOSIS — J44.81 BRONCHIOLITIS OBLITERANS (H): ICD-10-CM

## 2022-09-19 RX ORDER — ALBUTEROL SULFATE 90 UG/1
AEROSOL, METERED RESPIRATORY (INHALATION)
Qty: 18 G | Refills: 0 | Status: SHIPPED | OUTPATIENT
Start: 2022-09-19 | End: 2024-02-08

## 2022-09-19 RX ORDER — CYCLOBENZAPRINE HCL 10 MG
TABLET ORAL
Qty: 90 TABLET | Refills: 1 | Status: SHIPPED | OUTPATIENT
Start: 2022-09-19 | End: 2023-03-21

## 2022-09-19 NOTE — TELEPHONE ENCOUNTER
Prescription approved per [facility] Refill Protocol.  1 month refill only; patient due for appointment (physical/follow-up)  Ngozi ALEXANDRA RN

## 2022-09-21 NOTE — TELEPHONE ENCOUNTER
Routing refill request to provider for review/approval because:  Last ordered by non uptown provider.  Ngozi ALEXANDRA RN

## 2022-09-22 RX ORDER — FLUTICASONE PROPIONATE 50 MCG
1 SPRAY, SUSPENSION (ML) NASAL DAILY
Qty: 16 ML | Refills: 11 | Status: SHIPPED | OUTPATIENT
Start: 2022-09-22

## 2022-10-10 ENCOUNTER — HEALTH MAINTENANCE LETTER (OUTPATIENT)
Age: 47
End: 2022-10-10

## 2022-10-19 ENCOUNTER — MYC MEDICAL ADVICE (OUTPATIENT)
Dept: FAMILY MEDICINE | Facility: CLINIC | Age: 47
End: 2022-10-19

## 2022-10-19 DIAGNOSIS — E89.0 POSTOPERATIVE HYPOTHYROIDISM: Primary | ICD-10-CM

## 2022-10-20 ENCOUNTER — LAB (OUTPATIENT)
Dept: LAB | Facility: CLINIC | Age: 47
End: 2022-10-20
Payer: MEDICARE

## 2022-10-20 DIAGNOSIS — E89.0 POSTOPERATIVE HYPOTHYROIDISM: ICD-10-CM

## 2022-10-20 LAB
HOLD SPECIMEN: NORMAL
TSH SERPL DL<=0.005 MIU/L-ACNC: 3.15 MU/L (ref 0.4–4)

## 2022-10-20 PROCEDURE — 84443 ASSAY THYROID STIM HORMONE: CPT

## 2022-10-20 PROCEDURE — 2894A T3 TOTAL: CPT

## 2022-10-20 PROCEDURE — 83001 ASSAY OF GONADOTROPIN (FSH): CPT

## 2022-10-20 PROCEDURE — 36415 COLL VENOUS BLD VENIPUNCTURE: CPT

## 2022-10-20 PROCEDURE — 84481 FREE ASSAY (FT-3): CPT

## 2022-10-20 NOTE — TELEPHONE ENCOUNTER
DAVID,  Please see below [employer] message and advise.  Patient has TSH future order  Additional requested orders pended if you approve  Thanks,  Marely BLUM RN

## 2022-10-21 ENCOUNTER — DOCUMENTATION ONLY (OUTPATIENT)
Dept: LAB | Facility: CLINIC | Age: 47
End: 2022-10-21

## 2022-10-21 LAB
FSH SERPL IRP2-ACNC: 6.1 MIU/ML
T3 SERPL-MCNC: 66 NG/DL (ref 85–202)
T3FREE SERPL-MCNC: 2.2 PG/ML (ref 2–4.4)

## 2022-10-27 NOTE — RESULT ENCOUNTER NOTE
"Hello Clem[facility]  Your attached labs are within normal limits and improved. Keep up the good work!  Please contact the office with any questions or concerns.    Milena Allen \"Jc\" SWAPNIL Moreau    "

## 2023-01-20 ENCOUNTER — DOCUMENTATION ONLY (OUTPATIENT)
Dept: LAB | Facility: CLINIC | Age: 48
End: 2023-01-20
Payer: MEDICARE

## 2023-01-20 DIAGNOSIS — E89.0 POSTOPERATIVE HYPOTHYROIDISM: Primary | ICD-10-CM

## 2023-01-23 ENCOUNTER — LAB (OUTPATIENT)
Dept: LAB | Facility: CLINIC | Age: 48
End: 2023-01-23
Payer: MEDICARE

## 2023-01-23 ENCOUNTER — ALLIED HEALTH/NURSE VISIT (OUTPATIENT)
Dept: FAMILY MEDICINE | Facility: CLINIC | Age: 48
End: 2023-01-23
Payer: MEDICARE

## 2023-01-23 DIAGNOSIS — Z23 NEED FOR PNEUMOCOCCAL VACCINATION: Primary | ICD-10-CM

## 2023-01-23 DIAGNOSIS — E89.0 POSTOPERATIVE HYPOTHYROIDISM: ICD-10-CM

## 2023-01-23 LAB
T3 SERPL-MCNC: 74 NG/DL (ref 85–202)
T3FREE SERPL-MCNC: 2.1 PG/ML (ref 2–4.4)
T4 FREE SERPL-MCNC: 1.23 NG/DL (ref 0.9–1.7)
TSH SERPL DL<=0.005 MIU/L-ACNC: 1.73 UIU/ML (ref 0.3–4.2)

## 2023-01-23 PROCEDURE — 99207 PR NO CHARGE NURSE ONLY: CPT

## 2023-01-23 PROCEDURE — 84443 ASSAY THYROID STIM HORMONE: CPT

## 2023-01-23 PROCEDURE — 84481 FREE ASSAY (FT-3): CPT

## 2023-01-23 PROCEDURE — 90677 PCV20 VACCINE IM: CPT

## 2023-01-23 PROCEDURE — 84439 ASSAY OF FREE THYROXINE: CPT

## 2023-01-23 PROCEDURE — 36415 COLL VENOUS BLD VENIPUNCTURE: CPT

## 2023-01-23 PROCEDURE — G0009 ADMIN PNEUMOCOCCAL VACCINE: HCPCS

## 2023-01-23 NOTE — PROGRESS NOTES
Prior to immunization administration, verified patients identity using patient s name and date of birth. Please see Immunization Activity for additional information.     Screening Questionnaire for Adult Immunization    Are you sick today?   No   Do you have allergies to medications, food, a vaccine component or latex?   No   Have you ever had a serious reaction after receiving a vaccination?   No   Do you have a long-term health problem with heart, lung, kidney, or metabolic disease (e.g., diabetes), asthma, a blood disorder, no spleen, complement component deficiency, a cochlear implant, or a spinal fluid leak?  Are you on long-term aspirin therapy?   No   Do you have cancer, leukemia, HIV/AIDS, or any other immune system problem?   No   Do you have a parent, brother, or sister with an immune system problem?   No   In the past 3 months, have you taken medications that affect  your immune system, such as prednisone, other steroids, or anticancer drugs; drugs for the treatment of rheumatoid arthritis, Crohn s disease, or psoriasis; or have you had radiation treatments?   No   Have you had a seizure, or a brain or other nervous system problem?   No   During the past year, have you received a transfusion of blood or blood    products, or been given immune (gamma) globulin or antiviral drug?   No   For women: Are you pregnant or is there a chance you could become       pregnant during the next month?   No   Have you received any vaccinations in the past 4 weeks?   No     Immunization questionnaire answers were all negative.        Per orders of Dr. Moreau, injection of Prevnar 20  given by Randell Cho CMA. Patient instructed to remain in clinic for 15 minutes afterwards, and to report any adverse reaction to me immediately.       Screening performed by Randell Cho CMA on 1/23/2023 at 2:41 PM.

## 2023-01-24 NOTE — RESULT ENCOUNTER NOTE
"Hello [facility]  Your attached labs are within normal limits and stable.    Please contact the office with any questions or concerns.    Milena Allen \"Jc\" SWAPNIL Moreau    "

## 2023-04-21 ENCOUNTER — LAB (OUTPATIENT)
Dept: FAMILY MEDICINE | Facility: CLINIC | Age: 48
End: 2023-04-21

## 2023-04-21 ENCOUNTER — ANCILLARY PROCEDURE (OUTPATIENT)
Dept: GENERAL RADIOLOGY | Facility: CLINIC | Age: 48
End: 2023-04-21
Attending: PHYSICIAN ASSISTANT
Payer: MEDICARE

## 2023-04-21 ENCOUNTER — OFFICE VISIT (OUTPATIENT)
Dept: FAMILY MEDICINE | Facility: CLINIC | Age: 48
End: 2023-04-21
Payer: MEDICARE

## 2023-04-21 VITALS
HEIGHT: 63 IN | DIASTOLIC BLOOD PRESSURE: 69 MMHG | OXYGEN SATURATION: 96 % | HEART RATE: 127 BPM | SYSTOLIC BLOOD PRESSURE: 115 MMHG | RESPIRATION RATE: 16 BRPM | BODY MASS INDEX: 22.5 KG/M2 | TEMPERATURE: 97.3 F | WEIGHT: 127 LBS

## 2023-04-21 DIAGNOSIS — J44.81 BRONCHIOLITIS OBLITERANS (H): ICD-10-CM

## 2023-04-21 DIAGNOSIS — Z12.31 VISIT FOR SCREENING MAMMOGRAM: ICD-10-CM

## 2023-04-21 DIAGNOSIS — R22.31 NODULE OF FINGER OF RIGHT HAND: ICD-10-CM

## 2023-04-21 DIAGNOSIS — Z12.11 SCREEN FOR COLON CANCER: ICD-10-CM

## 2023-04-21 DIAGNOSIS — Z23 NEED FOR DIPHTHERIA-TETANUS-PERTUSSIS (TDAP) VACCINE: ICD-10-CM

## 2023-04-21 DIAGNOSIS — Z13.6 CARDIOVASCULAR SCREENING; LDL GOAL LESS THAN 160: ICD-10-CM

## 2023-04-21 DIAGNOSIS — Z00.00 ROUTINE GENERAL MEDICAL EXAMINATION AT A HEALTH CARE FACILITY: Primary | ICD-10-CM

## 2023-04-21 DIAGNOSIS — D69.6 TEMPORARY LOW PLATELET COUNT (H): ICD-10-CM

## 2023-04-21 DIAGNOSIS — E89.0 POSTOPERATIVE HYPOTHYROIDISM: ICD-10-CM

## 2023-04-21 DIAGNOSIS — Z13.1 SCREENING FOR DIABETES MELLITUS: ICD-10-CM

## 2023-04-21 DIAGNOSIS — Z13.0 SCREENING, ANEMIA, DEFICIENCY, IRON: ICD-10-CM

## 2023-04-21 DIAGNOSIS — M25.50 MULTIPLE JOINT PAIN: ICD-10-CM

## 2023-04-21 PROBLEM — F41.1 GAD (GENERALIZED ANXIETY DISORDER): Status: RESOLVED | Noted: 2018-03-29 | Resolved: 2023-04-21

## 2023-04-21 LAB
ALBUMIN SERPL BCG-MCNC: 4.5 G/DL (ref 3.5–5.2)
ALP SERPL-CCNC: 72 U/L (ref 35–104)
ALT SERPL W P-5'-P-CCNC: 18 U/L (ref 10–35)
ANION GAP SERPL CALCULATED.3IONS-SCNC: 15 MMOL/L (ref 7–15)
AST SERPL W P-5'-P-CCNC: 25 U/L (ref 10–35)
BASOPHILS # BLD AUTO: 0.1 10E3/UL (ref 0–0.2)
BASOPHILS NFR BLD AUTO: 1 %
BILIRUB SERPL-MCNC: 0.3 MG/DL
BUN SERPL-MCNC: 13.2 MG/DL (ref 6–20)
CALCIUM SERPL-MCNC: 9.2 MG/DL (ref 8.6–10)
CHLORIDE SERPL-SCNC: 102 MMOL/L (ref 98–107)
CHOLEST SERPL-MCNC: 243 MG/DL
CREAT SERPL-MCNC: 0.89 MG/DL (ref 0.51–0.95)
DEPRECATED HCO3 PLAS-SCNC: 24 MMOL/L (ref 22–29)
EOSINOPHIL # BLD AUTO: 0.4 10E3/UL (ref 0–0.7)
EOSINOPHIL NFR BLD AUTO: 5 %
ERYTHROCYTE [DISTWIDTH] IN BLOOD BY AUTOMATED COUNT: 13.7 % (ref 10–15)
FERRITIN SERPL-MCNC: 41 NG/ML (ref 6–175)
GFR SERPL CREATININE-BSD FRML MDRD: 81 ML/MIN/1.73M2
GLUCOSE SERPL-MCNC: 114 MG/DL (ref 70–99)
HCT VFR BLD AUTO: 44.1 % (ref 35–47)
HDLC SERPL-MCNC: 46 MG/DL
HGB BLD-MCNC: 14.2 G/DL (ref 11.7–15.7)
IRON BINDING CAPACITY (ROCHE): 277 UG/DL (ref 240–430)
IRON SATN MFR SERPL: 26 % (ref 15–46)
IRON SERPL-MCNC: 73 UG/DL (ref 37–145)
LDLC SERPL CALC-MCNC: 167 MG/DL
LYMPHOCYTES # BLD AUTO: 2.2 10E3/UL (ref 0.8–5.3)
LYMPHOCYTES NFR BLD AUTO: 27 %
MCH RBC QN AUTO: 29.6 PG (ref 26.5–33)
MCHC RBC AUTO-ENTMCNC: 32.2 G/DL (ref 31.5–36.5)
MCV RBC AUTO: 92 FL (ref 78–100)
MONOCYTES # BLD AUTO: 0.8 10E3/UL (ref 0–1.3)
MONOCYTES NFR BLD AUTO: 10 %
NEUTROPHILS # BLD AUTO: 4.7 10E3/UL (ref 1.6–8.3)
NEUTROPHILS NFR BLD AUTO: 59 %
NONHDLC SERPL-MCNC: 197 MG/DL
PLATELET # BLD AUTO: 186 10E3/UL (ref 150–450)
POTASSIUM SERPL-SCNC: 4.3 MMOL/L (ref 3.4–5.3)
PROT SERPL-MCNC: 7.6 G/DL (ref 6.4–8.3)
RBC # BLD AUTO: 4.8 10E6/UL (ref 3.8–5.2)
SODIUM SERPL-SCNC: 141 MMOL/L (ref 136–145)
T3 SERPL-MCNC: 86 NG/DL (ref 85–202)
T3FREE SERPL-MCNC: 2.5 PG/ML (ref 2–4.4)
T4 FREE SERPL-MCNC: 1.39 NG/DL (ref 0.9–1.7)
TRIGL SERPL-MCNC: 152 MG/DL
TSH SERPL DL<=0.005 MIU/L-ACNC: 1.58 UIU/ML (ref 0.3–4.2)
WBC # BLD AUTO: 8 10E3/UL (ref 4–11)

## 2023-04-21 PROCEDURE — 73130 X-RAY EXAM OF HAND: CPT | Mod: TC | Performed by: RADIOLOGY

## 2023-04-21 PROCEDURE — 84439 ASSAY OF FREE THYROXINE: CPT | Performed by: PHYSICIAN ASSISTANT

## 2023-04-21 PROCEDURE — 90471 IMMUNIZATION ADMIN: CPT | Performed by: PHYSICIAN ASSISTANT

## 2023-04-21 PROCEDURE — 83550 IRON BINDING TEST: CPT | Performed by: PHYSICIAN ASSISTANT

## 2023-04-21 PROCEDURE — 80061 LIPID PANEL: CPT | Performed by: PHYSICIAN ASSISTANT

## 2023-04-21 PROCEDURE — 71046 X-RAY EXAM CHEST 2 VIEWS: CPT | Mod: TC | Performed by: RADIOLOGY

## 2023-04-21 PROCEDURE — 83540 ASSAY OF IRON: CPT | Performed by: PHYSICIAN ASSISTANT

## 2023-04-21 PROCEDURE — 80050 GENERAL HEALTH PANEL: CPT | Performed by: PHYSICIAN ASSISTANT

## 2023-04-21 PROCEDURE — 82728 ASSAY OF FERRITIN: CPT | Performed by: PHYSICIAN ASSISTANT

## 2023-04-21 PROCEDURE — 36415 COLL VENOUS BLD VENIPUNCTURE: CPT | Performed by: PHYSICIAN ASSISTANT

## 2023-04-21 PROCEDURE — 99213 OFFICE O/P EST LOW 20 MIN: CPT | Mod: 25 | Performed by: PHYSICIAN ASSISTANT

## 2023-04-21 PROCEDURE — G0439 PPPS, SUBSEQ VISIT: HCPCS | Performed by: PHYSICIAN ASSISTANT

## 2023-04-21 PROCEDURE — 90715 TDAP VACCINE 7 YRS/> IM: CPT | Performed by: PHYSICIAN ASSISTANT

## 2023-04-21 PROCEDURE — 84481 FREE ASSAY (FT-3): CPT | Performed by: PHYSICIAN ASSISTANT

## 2023-04-21 ASSESSMENT — ENCOUNTER SYMPTOMS
HEARTBURN: 0
EYE PAIN: 0
MYALGIAS: 1
SORE THROAT: 0
WEAKNESS: 1
CHILLS: 0
DIARRHEA: 0
PALPITATIONS: 0
ABDOMINAL PAIN: 0
HEMATOCHEZIA: 0
PARESTHESIAS: 1
FEVER: 0
HEMATURIA: 0
NERVOUS/ANXIOUS: 0
FREQUENCY: 0
ARTHRALGIAS: 1
BREAST MASS: 0
DYSURIA: 0
NAUSEA: 0
SHORTNESS OF BREATH: 1
JOINT SWELLING: 1
DIZZINESS: 0

## 2023-04-21 ASSESSMENT — PAIN SCALES - GENERAL: PAINLEVEL: SEVERE PAIN (6)

## 2023-04-21 ASSESSMENT — ACTIVITIES OF DAILY LIVING (ADL): CURRENT_FUNCTION: NO ASSISTANCE NEEDED

## 2023-04-21 NOTE — PROGRESS NOTES
"SUBJECTIVE:   Clemencia is a 47 year old who presents for Preventive Visit.       View : No data to display.            Patient has been advised of split billing requirements and indicates understanding: Yes  Are you in the first 12 months of your Medicare coverage?  No    Healthy Habits:     In general, how would you rate your overall health?  Poor    Frequency of exercise:  2-3 days/week    Duration of exercise:  N/A    Do you usually eat at least 4 servings of fruit and vegetables a day, include whole grains    & fiber and avoid regularly eating high fat or \"junk\" foods?  No    Taking medications regularly:  Yes    Medication side effects:  Not applicable    Ability to successfully perform activities of daily living:  No assistance needed    Home Safety:  No safety concerns identified    Hearing Impairment:  Difficulty following a conversation in a noisy restaurant or crowded room and difficulty understanding soft or whispered speech    In the past 6 months, have you been bothered by leaking of urine?  No    In general, how would you rate your overall mental or emotional health?  Good      PHQ-2 Total Score: 0    Additional concerns today:  Yes    Dealing with facial rash, nodules (toes especially) joints swelling and pain - dermatology recently did labs, but doesn't have results yet (ARABELLA, RF etc).  FH with rheumatoid arthritis - mom and dad and aunts as well.  Patient seen by endocrinology regarding thyroid regularly, but doing well and stable.      Have you ever done Advance Care Planning? (For example, a Health Directive, POLST, or a discussion with a medical provider or your loved ones about your wishes): No, advance care planning information given to patient to review.  Patient declined advance care planning discussion at this time.       Fall risk       Cognitive Screening   1) Repeat 3 items (Leader, Season, Table)    2) Clock draw: ABNORMAL .  3) 3 item recall: Recalls 3 objects  Results: 3 items recalled: " COGNITIVE IMPAIRMENT LESS LIKELY    Mini-CogTM Copyright FAHAD Moe. Licensed by the author for use in [facility]; reprinted with permission (agustín@.[facility]). All rights reserved.      Do you have sleep apnea, excessive snoring or daytime drowsiness?: no    Reviewed and updated as needed this visit by clinical staff   Tobacco  Allergies  Meds  Problems  Med Hx  Surg Hx  Fam Hx          Reviewed and updated as needed this visit by Provider   Tobacco  Allergies  Meds  Problems  Med Hx  Surg Hx  Fam Hx         Social History     Tobacco Use     Smoking status: Never     Smokeless tobacco: Never   Vaping Use     Vaping status: Not on file   Substance Use Topics     Alcohol use: No           4/21/2023     3:14 AM   Alcohol Use   Prescreen: >3 drinks/day or >7 drinks/week? Not Applicable          View : No data to display.              Do you have a current opioid prescription? No  Do you use any other controlled substances or medications that are not prescribed by a provider? None          PROBLEMS TO ADD ON...    Current providers sharing in care for this patient include:   Patient Care Team:  Milena Moreau PA-C as PCP - General (Family Medicine)  Deepak Watson MD as Milena Rodney PA-C as Assigned PCP    The following health maintenance items are reviewed in Epic and correct as of today:  Health Maintenance   Topic Date Due     ANNUAL REVIEW OF HM ORDERS  Never done     COPD ACTION PLAN  Never done     MAMMO SCREENING  Never done     HEPATITIS B IMMUNIZATION (1 of 3 - 3-dose series) Never done     COLORECTAL CANCER SCREENING  Never done     HPV TEST  08/15/2023     PAP  08/15/2023     TSH W/FREE T4 REFLEX  01/23/2024     MEDICARE ANNUAL WELLNESS VISIT  04/21/2024     LIPID  07/05/2027     ADVANCE CARE PLANNING  04/21/2028     DTAP/TDAP/TD IMMUNIZATION (3 - Td or Tdap) 04/21/2033     SPIROMETRY  Completed     HEPATITIS C SCREENING  Completed     HIV SCREENING   Completed     PHQ-2 (once per calendar year)  Completed     INFLUENZA VACCINE  Completed     Pneumococcal Vaccine: Pediatrics (0 to 5 Years) and At-Risk Patients (6 to 64 Years)  Completed     COVID-19 Vaccine  Completed     IPV IMMUNIZATION  Aged Out     MENINGITIS IMMUNIZATION  Aged Out     Labs reviewed in EPIC  BP Readings from Last 3 Encounters:   04/21/23 115/69   02/25/22 112/68   10/05/21 108/72    Wt Readings from Last 3 Encounters:   04/21/23 57.6 kg (127 lb)   02/25/22 56.7 kg (124 lb 14.4 oz)   10/05/21 56.7 kg (124 lb 14.4 oz)                  Patient Active Problem List   Diagnosis     Thyrotoxicosis     Other congenital anomaly of lung     Fibromyalgia     CARDIOVASCULAR SCREENING; LDL GOAL LESS THAN 160     Seasonal allergic rhinitis     Postoperative hypothyroidism     RLQ abdominal pain     Polyarthralgia     obliterative bronchiolitis     H/O sinus tachycardia     Temporary low platelet count (H)     Head pain     Pain of right upper extremity     Controlled substance agreement signed     Ureterolithiasis     Perimenopause     Scoliosis, unspecified scoliosis type, unspecified spinal region     Sinus tachycardia     Past Surgical History:   Procedure Laterality Date     BIOPSY  2007    lung biopsy     THORACOSCOPIC BIOPSY RIB      x4     thyroid radiation      secondary to graves       Social History     Tobacco Use     Smoking status: Never     Smokeless tobacco: Never   Vaping Use     Vaping status: Not on file   Substance Use Topics     Alcohol use: No     Family History   Problem Relation Age of Onset     Diabetes Father      Other Cancer Maternal Grandfather         lung cancer     Diabetes Paternal Aunt      Diabetes Paternal Uncle      Asthma Other      Thyroid Disease Other      Asthma Other      Asthma Paternal Uncle          Current Outpatient Medications   Medication Sig Dispense Refill     albuterol (PROAIR HFA/PROVENTIL HFA/VENTOLIN HFA) 108 (90 Base) MCG/ACT inhaler INHALE 1 TO 2  "PUFFS INTO THE LUNGS EVERY 4 HOURS AS NEEDED FOR SHORTNESS OF BREATH OR DIFFICULT BREATHING OR WHEEZING 18 g 0     aspirin 325 MG tablet Take  by mouth daily. Takes 2 tablets as needed       BENADRYL OR AS NEEDED       Cholecalciferol (VITAMIN D PO)        cyclobenzaprine (FLEXERIL) 10 MG tablet TAKE 1/2 TO 1 TABLET(5 TO 10 MG) BY MOUTH THREE TIMES DAILY AS NEEDED FOR MUSCLE SPASMS 90 tablet 0     fluticasone (FLONASE) 50 MCG/ACT nasal spray Spray 1 spray into both nostrils daily 16 mL 11     gabapentin (NEURONTIN) 300 MG capsule TAKE 1 CAPSULE BY MOUTH THREE TIMES DAILY 90 capsule 3     hydrocortisone 2.5 % cream   2     hydrocortisone valerate (WEST-GISELE) 0.2 % ointment Apply sparingly to affected area three times daily as needed. 45 g 0     levothyroxine (SYNTHROID) 112 MCG tablet Take 100 mcg by mouth daily        LORazepam (ATIVAN) 0.5 MG tablet TAKE ONE TABLET BY MOUTH DAILY AS NEEDED FOR ANXIETY. MUST LAST 30 DAYS 30 tablet 0     metoprolol succinate ER (TOPROL XL) 50 MG 24 hr tablet TAKE 2 TABLETS(100 MG) BY MOUTH DAILY 60 tablet 0     phenylephrine (CASTRO-SYNEPHRINE) 0.25 % nasal spray Spray 1 spray into both nostrils every 4 hours as needed for congestion       Allergies   Allergen Reactions     Sulfa Drugs Other (See Comments)     Never taken medication however \"was told to put it down just in case\"     Recent Labs   Lab Test 01/23/23  1419 10/20/22  1400 07/05/22  1424 02/25/22  1618 06/06/19  1506 02/26/19  1324 05/24/17  1458 01/08/17  0913   A1C  --   --   --  5.4  --   --   --   --    LDL  --   --  140*  --   --   --   --   --    HDL  --   --  42*  --   --   --   --   --    TRIG  --   --  162*  --   --   --   --   --    ALT  --   --  26  --   --  28  --  22   CR  --   --  0.83  --   --  0.98  --  1.06*   GFRESTIMATED  --   --  88  --   --  71  --  57*   GFRESTBLACK  --   --   --   --   --  82  --  69   POTASSIUM  --   --  4.4  --   --  3.8  --  4.1   TSH 1.73 3.15 0.26*  --    < >  --    < > 1.34    < " "> = values in this interval not displayed.      Mammogram Screening: Mammogram Screening: Recommended mammography every 1-2 years with patient discussion and risk factor consideration        4/21/2023     3:15 AM   Breast CA Risk Assessment (FHS-7)   Do you have a family history of breast, colon, or ovarian cancer? No / Unknown       Mammogram Screening: Recommended annual mammography  Pertinent mammograms are reviewed under the imaging tab.    Review of Systems   Constitutional: Negative for chills and fever.   HENT: Positive for hearing loss. Negative for congestion, ear pain and sore throat.    Eyes: Negative for pain and visual disturbance.   Respiratory: Positive for shortness of breath.    Cardiovascular: Negative for chest pain and palpitations.   Gastrointestinal: Negative for abdominal pain, diarrhea, heartburn, hematochezia and nausea.   Breasts:  Negative for tenderness, breast mass and discharge.   Genitourinary: Negative for dysuria, frequency, genital sores, hematuria, pelvic pain, urgency, vaginal bleeding and vaginal discharge.   Musculoskeletal: Positive for arthralgias, joint swelling and myalgias.   Skin: Positive for rash.   Neurological: Positive for weakness and paresthesias. Negative for dizziness.   Psychiatric/Behavioral: Negative for mood changes. The patient is not nervous/anxious.        OBJECTIVE:   /69   Pulse (!) 127   Temp 97.3  F (36.3  C) (Temporal)   Resp 16   Ht 1.6 m (5' 3\")   Wt 57.6 kg (127 lb)   SpO2 96%   BMI 22.50 kg/m   Estimated body mass index is 22.5 kg/m  as calculated from the following:    Height as of this encounter: 1.6 m (5' 3\").    Weight as of this encounter: 57.6 kg (127 lb).  Physical Exam  GENERAL: healthy, alert and no distress  EYES: Eyes grossly normal to inspection, PERRL and conjunctivae and sclerae normal  HENT: ear canals and TM's normal, nose and mouth without ulcers or lesions  NECK: no adenopathy, no asymmetry, masses, or scars and " thyroid normal to palpation  RESP: lungs clear to auscultation - no rales, rhonchi or wheezes  BREAST: normal without masses, tenderness or nipple discharge and no palpable axillary masses or adenopathy  CV: regular rate and rhythm, normal S1 S2, no S3 or S4, no murmur, click or rub, no peripheral edema and peripheral pulses strong  ABDOMEN: soft, nontender, no hepatosplenomegaly, no masses and bowel sounds normal   (female): normal female external genitalia, normal urethral meatus, vaginal mucosa pink, moist, well rugated, and normal cervix/adnexa/uterus without masses or discharge; pap smear done today  MS: no gross musculoskeletal defects noted, no edema  SKIN: no suspicious lesions or rashes  NEURO: Normal strength and tone, mentation intact and speech normal  PSYCH: mentation appears normal, affect normal/bright    Diagnostic Test Results:  Labs reviewed in Epic    ASSESSMENT / PLAN:       ICD-10-CM    1. Routine general medical examination at a health care facility  Z00.00       2. Nodule of finger of right hand  R22.31 XR Hand Right G/E 3 Views     Adult Rheumatology  Referral      3. Multiple joint pain  M25.50 Adult Rheumatology  Referral      4. Bronchiolitis obliterans (H)  J42 XR Chest 2 Views      5. Temporary low platelet count (H)  D69.6       6. Postoperative hypothyroidism  E89.0 TSH     T3, Free     T3, total     T4, free      7. Visit for screening mammogram  Z12.31 MA SCREENING DIGITAL BILAT - Future  (s+30)      8. Screening, anemia, deficiency, iron  Z13.0 CBC with Platelets & Differential     Ferritin     Iron & Iron Binding Capacity      9. Screening for diabetes mellitus  Z13.1 Comprehensive metabolic panel      10. CARDIOVASCULAR SCREENING; LDL GOAL LESS THAN 160  Z13.6 Lipid panel reflex to direct LDL Fasting      11. Need for diphtheria-tetanus-pertussis (Tdap) vaccine  Z23 TDAP VACCINE (Adacel, Boostrix)  [[id number]]      12. Screen for colon cancer  Z12.11  JORDANA([employer])          Patient has been advised of split billing requirements and indicates understanding: Yes      COUNSELING:  Reviewed preventive health counseling, as reflected in patient instructions       Regular exercise       Healthy diet/nutrition       Vision screening       Hearing screening        She reports that she has never smoked. She has never used smokeless tobacco.      Appropriate preventive services were discussed with this patient, including applicable screening as appropriate for cardiovascular disease, diabetes, osteopenia/osteoporosis, and glaucoma.  As appropriate for age/gender, discussed screening for colorectal cancer, prostate cancer, breast cancer, and cervical cancer. Checklist reviewing preventive services available has been given to the patient.    Reviewed patients plan of care and provided an AVS. The Basic Care Plan (routine screening as documented in Health Maintenance) for Clemencia meets the Care Plan requirement. This Care Plan has been established and reviewed with the Patient.          Milena Moreau PA-C  [facility]    Identified Health Risks:    I have reviewed Opioid Use Disorder and Substance Use Disorder risk factors and made any needed referrals.

## 2023-04-21 NOTE — RESULT ENCOUNTER NOTE
"[facility] Clemencia  Your attached xray doesn't mention any specific nodule... we'll keep trying though.    Please contact the office with any questions or concerns.    Alejandro,  Milena \"Jc\" SWAPNIL Moreau    "

## 2023-04-24 NOTE — RESULT ENCOUNTER NOTE
"[facility] Clemencia  Your attached labs are looking really good.   Continue to work on healthy diet and exercise for your cholesterol levels.    Please contact the office with any questions or concerns.    Milena Allen \"Jc\" SWAPNIL Moreau    "

## 2023-04-24 NOTE — RESULT ENCOUNTER NOTE
"[facility] Clemencia  Your attached chest xray is stable with no concerning nodules noted.  Please contact the office with any questions or concerns.    Milena Allen \"Jc\" SWAPNIL Moreau    " Patent

## 2023-06-10 ENCOUNTER — HEALTH MAINTENANCE LETTER (OUTPATIENT)
Age: 48
End: 2023-06-10

## 2023-06-14 DIAGNOSIS — M79.7 FIBROMYALGIA: ICD-10-CM

## 2023-06-15 RX ORDER — CYCLOBENZAPRINE HCL 10 MG
TABLET ORAL
Qty: 90 TABLET | Refills: 0 | Status: SHIPPED | OUTPATIENT
Start: 2023-06-15 | End: 2023-09-12

## 2023-07-11 ENCOUNTER — LAB (OUTPATIENT)
Dept: LAB | Facility: CLINIC | Age: 48
End: 2023-07-11
Payer: MEDICARE

## 2023-07-11 DIAGNOSIS — E89.0 POSTOPERATIVE HYPOTHYROIDISM: ICD-10-CM

## 2023-07-11 LAB
T3 SERPL-MCNC: 78 NG/DL (ref 85–202)
T3FREE SERPL-MCNC: 2.4 PG/ML (ref 2–4.4)
T4 FREE SERPL-MCNC: 1.52 NG/DL (ref 0.9–1.7)
TSH SERPL DL<=0.005 MIU/L-ACNC: 0.68 UIU/ML (ref 0.3–4.2)

## 2023-07-11 PROCEDURE — 36415 COLL VENOUS BLD VENIPUNCTURE: CPT

## 2023-07-11 PROCEDURE — 84443 ASSAY THYROID STIM HORMONE: CPT

## 2023-07-11 PROCEDURE — 84439 ASSAY OF FREE THYROXINE: CPT

## 2023-07-11 PROCEDURE — 84481 FREE ASSAY (FT-3): CPT

## 2023-07-13 NOTE — RESULT ENCOUNTER NOTE
"[facility] Khanna  Your attached labs are overall within normal limits and stable... your TSH level is on the low end of normal, but still normal (meaning if anything you're on too much synthroid dosage).  Let me know how your feeling.    Please contact the office with any questions or concerns.    Milena Allen \"Jc\" SWAPNIL Moreau    "

## 2023-08-19 DIAGNOSIS — Z86.79 H/O SINUS TACHYCARDIA: ICD-10-CM

## 2023-08-21 RX ORDER — METOPROLOL SUCCINATE 50 MG/1
TABLET, EXTENDED RELEASE ORAL
Qty: 180 TABLET | Refills: 0 | Status: SHIPPED | OUTPATIENT
Start: 2023-08-21 | End: 2023-11-24

## 2023-09-11 DIAGNOSIS — M79.7 FIBROMYALGIA: ICD-10-CM

## 2023-09-12 RX ORDER — CYCLOBENZAPRINE HCL 10 MG
TABLET ORAL
Qty: 90 TABLET | Refills: 0 | Status: SHIPPED | OUTPATIENT
Start: 2023-09-12 | End: 2023-12-04

## 2023-11-05 ENCOUNTER — DOCUMENTATION ONLY (OUTPATIENT)
Dept: FAMILY MEDICINE | Facility: CLINIC | Age: 48
End: 2023-11-05
Payer: MEDICARE

## 2023-11-05 DIAGNOSIS — E89.0 POSTOPERATIVE HYPOTHYROIDISM: Primary | ICD-10-CM

## 2023-11-06 ENCOUNTER — LAB (OUTPATIENT)
Dept: LAB | Facility: CLINIC | Age: 48
End: 2023-11-06
Payer: MEDICARE

## 2023-11-06 ENCOUNTER — ALLIED HEALTH/NURSE VISIT (OUTPATIENT)
Dept: FAMILY MEDICINE | Facility: CLINIC | Age: 48
End: 2023-11-06
Payer: MEDICARE

## 2023-11-06 DIAGNOSIS — E89.0 POSTOPERATIVE HYPOTHYROIDISM: ICD-10-CM

## 2023-11-06 DIAGNOSIS — Z23 HIGH PRIORITY FOR 2019-NCOV VACCINE: Primary | ICD-10-CM

## 2023-11-06 PROCEDURE — 99207 PR NO CHARGE NURSE ONLY: CPT

## 2023-11-06 PROCEDURE — 84443 ASSAY THYROID STIM HORMONE: CPT

## 2023-11-06 PROCEDURE — 84481 FREE ASSAY (FT-3): CPT

## 2023-11-06 PROCEDURE — 36415 COLL VENOUS BLD VENIPUNCTURE: CPT

## 2023-11-06 PROCEDURE — 91320 SARSCV2 VAC 30MCG TRS-SUC IM: CPT

## 2023-11-06 PROCEDURE — 84439 ASSAY OF FREE THYROXINE: CPT

## 2023-11-06 PROCEDURE — 90480 ADMN SARSCOV2 VAC 1/ONLY CMP: CPT

## 2023-11-07 ENCOUNTER — MYC MEDICAL ADVICE (OUTPATIENT)
Dept: FAMILY MEDICINE | Facility: CLINIC | Age: 48
End: 2023-11-07
Payer: MEDICARE

## 2023-11-07 LAB
T3 SERPL-MCNC: 74 NG/DL (ref 85–202)
T3FREE SERPL-MCNC: 2.2 PG/ML (ref 2–4.4)
T4 FREE SERPL-MCNC: 1.19 NG/DL (ref 0.9–1.7)
TSH SERPL DL<=0.005 MIU/L-ACNC: 4.52 UIU/ML (ref 0.3–4.2)

## 2023-11-07 NOTE — RESULT ENCOUNTER NOTE
"Heltam[facility] Clemencia  Your TSH and T3 levels are high - how much is your synthroid dosage? We may want to bump it up a little pending how you are feeling.    Please contact the office with any questions or concerns.    Milena Allen \"Jc\" SWAPNIL Moreau    "

## 2023-11-23 DIAGNOSIS — Z86.79 H/O SINUS TACHYCARDIA: ICD-10-CM

## 2023-11-24 RX ORDER — METOPROLOL SUCCINATE 50 MG/1
TABLET, EXTENDED RELEASE ORAL
Qty: 180 TABLET | Refills: 0 | Status: SHIPPED | OUTPATIENT
Start: 2023-11-24 | End: 2024-05-09

## 2023-12-04 DIAGNOSIS — M79.7 FIBROMYALGIA: ICD-10-CM

## 2023-12-04 RX ORDER — CYCLOBENZAPRINE HCL 10 MG
TABLET ORAL
Qty: 90 TABLET | Refills: 0 | Status: SHIPPED | OUTPATIENT
Start: 2023-12-04 | End: 2024-02-27

## 2023-12-27 ENCOUNTER — OFFICE VISIT (OUTPATIENT)
Dept: URGENT CARE | Facility: URGENT CARE | Age: 48
End: 2023-12-27
Payer: MEDICARE

## 2023-12-27 VITALS
DIASTOLIC BLOOD PRESSURE: 80 MMHG | HEART RATE: 96 BPM | RESPIRATION RATE: 12 BRPM | SYSTOLIC BLOOD PRESSURE: 132 MMHG | TEMPERATURE: 98.3 F | OXYGEN SATURATION: 97 %

## 2023-12-27 DIAGNOSIS — H61.23 BILATERAL IMPACTED CERUMEN: Primary | ICD-10-CM

## 2023-12-27 PROCEDURE — 69210 REMOVE IMPACTED EAR WAX UNI: CPT | Performed by: EMERGENCY MEDICINE

## 2023-12-27 NOTE — PROGRESS NOTES
Assessment & Plan     Diagnosis:    ICD-10-CM    1. Bilateral impacted cerumen  H61.23           Medical Decision Making:  Clemencia Rosales is a 48 year old female who presents for evaluation of otalgia on the right side.  The patient has an exam consistent with cerumen impaction. No signs of mastoiditis, meningitis, perforation, mass, dental abscess, or peritonsillar abscess, referred pain, cholesteatoma, otitis externa, etc.  She may take Tylenol or Ibuprofen for pain.  Return if increasing pain, fever, decrease in hearing or ear discharge.  Follow-up with primary physician in 7-10 days, if symptoms persist then an ENT consultation may be needed as outpatient.      Rosalino Gaming PA-C  [facility]    Subjective     Clemencia Rosales is a 48 year old female who presents to clinic today for the following health issues:  Chief Complaint   Patient presents with    Ear Problem     Right Ear pain x1wk, debrox is not helping, difficulty hearing, history of hearing loss in left year, body ache, patient states she feels flushing, lightheadedness, pain radiates to neck and shoulder       HPI  Patient reports history of cerumen impaction, feels that her right ear has been getting plugged lately.  Deep breaths not helping and if anything it hurt little bit earlier today.  She does have some flushing sensation, lightheadedness and pain rating towards her neck as well on the right side.  She denies any sore throat, cough, fevers, ear discharge, neck pain or stiffness, difficulty swallowing or breathing or other concerns.    Review of Systems    See HPI    Objective      Vitals: /80   Pulse 96   Temp 98.3  F (36.8  C) (Oral)   Resp 12   LMP 12/20/2023 (Approximate)   SpO2 97%       Patient Vitals for the past 24 hrs:   BP Temp Temp src Pulse Resp SpO2   12/27/23 1617 132/80 98.3  F (36.8  C) Oral 96 12 97 %       Vital signs reviewed by: Rosalino Gaming PA-C    Physical Exam   Constitutional: Alert  and active. With caregiver; in no acute distress.  HENT: Ears: Bilateral TMs obstructed by cerumen. No tenderness with manipulation of the pinnae and tragus. No mastoid tenderness bilaterally.  Nose: Nose normal.    Mouth: Normal tongue and tonsil. Posterior oropharynx is clear. Uvula is midline.  Cardiovascular: Regular rate and rhythm  Pulmonary/Chest: Effort normal. No respiratory distress. Lungs clear to auscultation bilaterally.  Skin: No rash noted on visualized skin or face.      Procedure:  PROCEDURE NOTE: Cerumen disimpaction   Provider: Rosalino Gaming PA-C  Indications: Otalgia (right ear), cerumen disimpaction  Consent: Verbal  Description of Procedure:  The patient was placed in the supine position.  Using direct visualization the cerumen was slowly removed piecemeal from the ear with a soft-looped plastic curette.  Patients hearing was improved. Remainder was irrigated out. The TMs were non-erythematous or bulging. No perforation. No complications. Patient tolerated procedure well.    Rosalino Gaming PA-C, December 27, 2023

## 2024-02-08 DIAGNOSIS — J44.81 BRONCHIOLITIS OBLITERANS (H): ICD-10-CM

## 2024-02-09 RX ORDER — ALBUTEROL SULFATE 90 UG/1
AEROSOL, METERED RESPIRATORY (INHALATION)
Qty: 18 G | Refills: 0 | Status: SHIPPED | OUTPATIENT
Start: 2024-02-09

## 2024-02-09 NOTE — TELEPHONE ENCOUNTER
Prescription approved per [facility] Refill Protocol.  June Umana, RN  [facility] Triage Nurse

## 2024-02-26 ENCOUNTER — OFFICE VISIT (OUTPATIENT)
Dept: RHEUMATOLOGY | Facility: CLINIC | Age: 49
End: 2024-02-26
Attending: INTERNAL MEDICINE
Payer: MEDICARE

## 2024-02-26 ENCOUNTER — LAB (OUTPATIENT)
Dept: LAB | Facility: CLINIC | Age: 49
End: 2024-02-26
Attending: INTERNAL MEDICINE
Payer: MEDICARE

## 2024-02-26 VITALS
OXYGEN SATURATION: 96 % | HEART RATE: 118 BPM | DIASTOLIC BLOOD PRESSURE: 78 MMHG | WEIGHT: 131 LBS | BODY MASS INDEX: 23.21 KG/M2 | SYSTOLIC BLOOD PRESSURE: 114 MMHG

## 2024-02-26 DIAGNOSIS — Z11.59 ENCOUNTER FOR SCREENING FOR OTHER VIRAL DISEASES: ICD-10-CM

## 2024-02-26 DIAGNOSIS — I73.00 RAYNAUD'S DISEASE WITHOUT GANGRENE: ICD-10-CM

## 2024-02-26 DIAGNOSIS — G89.29 CHRONIC PAIN OF BOTH ANKLES: Primary | ICD-10-CM

## 2024-02-26 DIAGNOSIS — M25.572 CHRONIC PAIN OF BOTH ANKLES: Primary | ICD-10-CM

## 2024-02-26 DIAGNOSIS — J44.9 OBSTRUCTIVE LUNG DISEASE (H): ICD-10-CM

## 2024-02-26 DIAGNOSIS — M25.571 CHRONIC PAIN OF BOTH ANKLES: Primary | ICD-10-CM

## 2024-02-26 DIAGNOSIS — R21 RASH: ICD-10-CM

## 2024-02-26 LAB
ACANTHOCYTES BLD QL SMEAR: ABNORMAL
ALBUMIN SERPL BCG-MCNC: 4.3 G/DL (ref 3.5–5.2)
ALBUMIN UR-MCNC: 20 MG/DL
ALP SERPL-CCNC: 56 U/L (ref 40–150)
ALT SERPL W P-5'-P-CCNC: 16 U/L (ref 0–50)
ANION GAP SERPL CALCULATED.3IONS-SCNC: 9 MMOL/L (ref 7–15)
APPEARANCE UR: ABNORMAL
AST SERPL W P-5'-P-CCNC: 24 U/L (ref 0–45)
AUER BODIES BLD QL SMEAR: ABNORMAL
BASO STIPL BLD QL SMEAR: ABNORMAL
BASOPHILS # BLD AUTO: 0.1 10E3/UL (ref 0–0.2)
BASOPHILS NFR BLD AUTO: 1 %
BILIRUB SERPL-MCNC: 0.2 MG/DL
BILIRUB UR QL STRIP: NEGATIVE
BITE CELLS BLD QL SMEAR: ABNORMAL
BLISTER CELLS BLD QL SMEAR: ABNORMAL
BUN SERPL-MCNC: 13.9 MG/DL (ref 6–20)
BURR CELLS BLD QL SMEAR: SLIGHT
CALCIUM SERPL-MCNC: 8.4 MG/DL (ref 8.6–10)
CHLORIDE SERPL-SCNC: 104 MMOL/L (ref 98–107)
COLOR UR AUTO: YELLOW
CREAT SERPL-MCNC: 0.86 MG/DL (ref 0.51–0.95)
CRP SERPL-MCNC: 9.17 MG/L
DACRYOCYTES BLD QL SMEAR: ABNORMAL
DEPRECATED HCO3 PLAS-SCNC: 27 MMOL/L (ref 22–29)
EGFRCR SERPLBLD CKD-EPI 2021: 83 ML/MIN/1.73M2
ELLIPTOCYTES BLD QL SMEAR: ABNORMAL
EOSINOPHIL # BLD AUTO: 0.3 10E3/UL (ref 0–0.7)
EOSINOPHIL NFR BLD AUTO: 4 %
ERYTHROCYTE [DISTWIDTH] IN BLOOD BY AUTOMATED COUNT: 12.8 % (ref 10–15)
FRAGMENTS BLD QL SMEAR: ABNORMAL
GLUCOSE SERPL-MCNC: 101 MG/DL (ref 70–99)
GLUCOSE UR STRIP-MCNC: NEGATIVE MG/DL
HBV CORE AB SERPL QL IA: NONREACTIVE
HBV SURFACE AB SERPL IA-ACNC: <3.5 M[IU]/ML
HBV SURFACE AB SERPL IA-ACNC: NONREACTIVE M[IU]/ML
HBV SURFACE AG SERPL QL IA: NONREACTIVE
HCT VFR BLD AUTO: 42.3 % (ref 35–47)
HGB BLD-MCNC: 13.7 G/DL (ref 11.7–15.7)
HGB C CRYSTALS: ABNORMAL
HGB UR QL STRIP: ABNORMAL
HOWELL-JOLLY BOD BLD QL SMEAR: ABNORMAL
IMM GRANULOCYTES # BLD: 0 10E3/UL
IMM GRANULOCYTES NFR BLD: 0 %
KETONES UR STRIP-MCNC: NEGATIVE MG/DL
LEUKOCYTE ESTERASE UR QL STRIP: NEGATIVE
LYMPHOCYTES # BLD AUTO: 1.7 10E3/UL (ref 0.8–5.3)
LYMPHOCYTES NFR BLD AUTO: 21 %
MCH RBC QN AUTO: 29.2 PG (ref 26.5–33)
MCHC RBC AUTO-ENTMCNC: 32.4 G/DL (ref 31.5–36.5)
MCV RBC AUTO: 90 FL (ref 78–100)
MONOCYTES # BLD AUTO: 0.6 10E3/UL (ref 0–1.3)
MONOCYTES NFR BLD AUTO: 7 %
MUCOUS THREADS #/AREA URNS LPF: PRESENT /LPF
NEUTROPHILS # BLD AUTO: 5.1 10E3/UL (ref 1.6–8.3)
NEUTROPHILS NFR BLD AUTO: 67 %
NEUTS HYPERSEG BLD QL SMEAR: ABNORMAL
NITRATE UR QL: NEGATIVE
NRBC # BLD AUTO: 0 10E3/UL
NRBC BLD AUTO-RTO: 0 /100
PH UR STRIP: 5.5 [PH] (ref 5–7)
PLAT MORPH BLD: ABNORMAL
PLATELET # BLD AUTO: 177 10E3/UL (ref 150–450)
POLYCHROMASIA BLD QL SMEAR: SLIGHT
POTASSIUM SERPL-SCNC: 4.4 MMOL/L (ref 3.4–5.3)
PROT SERPL-MCNC: 7.3 G/DL (ref 6.4–8.3)
RBC # BLD AUTO: 4.69 10E6/UL (ref 3.8–5.2)
RBC AGGLUT BLD QL: ABNORMAL
RBC MORPH BLD: ABNORMAL
RBC URINE: <1 /HPF
ROULEAUX BLD QL SMEAR: ABNORMAL
SICKLE CELLS BLD QL SMEAR: ABNORMAL
SMUDGE CELLS BLD QL SMEAR: ABNORMAL
SODIUM SERPL-SCNC: 140 MMOL/L (ref 135–145)
SP GR UR STRIP: 1.03 (ref 1–1.03)
SPHEROCYTES BLD QL SMEAR: ABNORMAL
SQUAMOUS EPITHELIAL: 6 /HPF
STOMATOCYTES BLD QL SMEAR: ABNORMAL
TARGETS BLD QL SMEAR: ABNORMAL
TOXIC GRANULES BLD QL SMEAR: ABNORMAL
TRANSITIONAL EPI: <1 /HPF
UROBILINOGEN UR STRIP-MCNC: NORMAL MG/DL
VARIANT LYMPHS BLD QL SMEAR: ABNORMAL
WBC # BLD AUTO: 7.7 10E3/UL (ref 4–11)
WBC URINE: 2 /HPF

## 2024-02-26 PROCEDURE — 82164 ANGIOTENSIN I ENZYME TEST: CPT

## 2024-02-26 PROCEDURE — 86481 TB AG RESPONSE T-CELL SUSP: CPT

## 2024-02-26 PROCEDURE — 86235 NUCLEAR ANTIGEN ANTIBODY: CPT

## 2024-02-26 PROCEDURE — 81001 URINALYSIS AUTO W/SCOPE: CPT

## 2024-02-26 PROCEDURE — G0463 HOSPITAL OUTPT CLINIC VISIT: HCPCS | Performed by: INTERNAL MEDICINE

## 2024-02-26 PROCEDURE — 87340 HEPATITIS B SURFACE AG IA: CPT

## 2024-02-26 PROCEDURE — 80053 COMPREHEN METABOLIC PANEL: CPT

## 2024-02-26 PROCEDURE — 86200 CCP ANTIBODY: CPT

## 2024-02-26 PROCEDURE — 86706 HEP B SURFACE ANTIBODY: CPT

## 2024-02-26 PROCEDURE — 99205 OFFICE O/P NEW HI 60 MIN: CPT | Performed by: INTERNAL MEDICINE

## 2024-02-26 PROCEDURE — 85025 COMPLETE CBC W/AUTO DIFF WBC: CPT

## 2024-02-26 PROCEDURE — 36415 COLL VENOUS BLD VENIPUNCTURE: CPT

## 2024-02-26 PROCEDURE — 86140 C-REACTIVE PROTEIN: CPT

## 2024-02-26 PROCEDURE — 86225 DNA ANTIBODY NATIVE: CPT

## 2024-02-26 PROCEDURE — 86704 HEP B CORE ANTIBODY TOTAL: CPT

## 2024-02-26 RX ORDER — KETOCONAZOLE 20 MG/ML
SHAMPOO TOPICAL
COMMUNITY
Start: 2024-01-30

## 2024-02-26 RX ORDER — FLUOCINONIDE TOPICAL SOLUTION USP, 0.05% 0.5 MG/ML
SOLUTION TOPICAL
COMMUNITY
Start: 2024-01-30

## 2024-02-26 ASSESSMENT — PAIN SCALES - GENERAL: PAINLEVEL: SEVERE PAIN (7)

## 2024-02-26 NOTE — LETTER
2/26/2024       RE: Clemencia Rosales  [address]  [facility] [address]     Dear Colleague,    Thank you for referring your patient, Clemencia Rosales, to the [facility] RHEUMATOLOGY at [facility]. Please see a copy of my visit note below.                       2/26/2024    Chief Complaint/Reason for Visit: polyarthralgia    HPI:    Clemencia Rosales  is a 48 year old  female with past medical history listed below.  The patient reports the following symptoms going on and off for the last 2 years.  She reports having pain and swelling of both ankles as well as the feet.  She states lately it has been the right ankle that is being more swollen but both ankles do swell up at the same time symptoms as well.  She is also complaining of getting a rash on her face that worsens on exposure to sunlight.  Review of systems positive for dry mouth, difficulty swallowing, fingertips changing color to blue and purple on exposure to cold.  In the past, she was diagnosed with obliterative bronchiolitis and been following up with pulmonology.  She has not seen a pulmonologist in the last 10 years.  She would like to get reestablished with pulmonology at the [facility].  She said she takes ibuprofen 200 mg at night and has tried ice and heatwithout complete relief.    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 Sanches at [place]; lung bx 2007 bronchiolitis and bronchiectasis,; had severe viral pneumonia age 15    Other congenital anomaly of lung     restrictive/obstructive disease,  30% lung capapcity    Pneumonia     viral pneumonia-age 16yo, was intubated in the ICU    Sinus tachycardia     Temporary low platelet count (H24) 6/12/2014    Thyrotoxicosis without mention of goiter or other cause, without mention of thyrotoxic crisis or storm 2002    Treated with irradiation-on synthroid     Past Surgical History:   Procedure Laterality Date    BIOPSY  2007    lung biopsy    THORACOSCOPIC BIOPSY RIB      x4    thyroid radiation      secondary to graves     Family History   Problem Relation Age of Onset    Diabetes Father     Other Cancer Maternal Grandfather         lung cancer    Diabetes Paternal Aunt     Diabetes Paternal Uncle     Asthma Other     Thyroid Disease Other     Asthma Other     Asthma Paternal Uncle      Social History     Socioeconomic History    Marital status: Single   Tobacco Use    Smoking status: Never    Smokeless tobacco: Never   Substance and Sexual Activity    Alcohol use: No    Drug use: No    Sexual activity: Not Currently   Other Topics Concern    Parent/sibling w/ CABG, MI or angioplasty before 65F 55M? No    Caffeine Concern No     Comment: occasional small size coke     Exercise Yes     Comment: rehab    Seat Belt Yes       Allergies   Allergen Reactions    Sulfa Antibiotics Other (See Comments)     Never taken medication however \"was told to put it down just in case\"       Current Outpatient Medications   Medication    albuterol (PROAIR HFA/PROVENTIL HFA/VENTOLIN HFA) 108 (90 Base) MCG/ACT inhaler    aspirin 325 MG tablet    BENADRYL OR    Cholecalciferol (VITAMIN D PO)    " cyclobenzaprine (FLEXERIL) 10 MG tablet    fluocinonide (LIDEX) 0.05 % external solution    fluticasone (FLONASE) 50 MCG/ACT nasal spray    gabapentin (NEURONTIN) 300 MG capsule    hydrocortisone 2.5 % cream    hydrocortisone valerate (WEST-GISELE) 0.2 % ointment    ketoconazole (NIZORAL) 2 % external shampoo    levothyroxine (SYNTHROID) 112 MCG tablet    metoprolol succinate ER (TOPROL XL) 50 MG 24 hr tablet    phenylephrine (CASTRO-SYNEPHRINE) 0.25 % nasal spray    LORazepam (ATIVAN) 0.5 MG tablet     No current facility-administered medications for this visit.       PHYSICAL EXAM    /78 (BP Location: Right arm, Patient Position: Sitting, Cuff Size: Adult Regular)   Pulse 118   Wt 59.4 kg (131 lb)   LMP 02/21/2024 (Approximate)   SpO2 96%   BMI 23.21 kg/m        General: Alert, No apparent distress   Psych: Affect euthymic  Eyes: Sclera noninjected  Ears, Nose, Throat, Mouth: Oral mucosa moist with normal salivary pool  Neck: No lymphadenopathy  Skin : erythematous macular rash noted on back.   Cardiovascular: Regular rate and rhythm, Normal S1 and S2 and No murmurs, rubs or gallops  Respiratory: Clear to auscultation with no wheezing or crackles  Neuro: Normal gait and Able to arise from seated position unassisted  Musculoskeletal: Hands:  Normal.  Wrists:  Normal.  Elbows:  Normal.  Shoulders:  Normal.  Feet:  Normal.  Ankles:  minimal swelling + right ankle   Knees:  Normal.  Hips:  Normal.  Spine:  Normal.  Tender points:  Negative.  No joint swelling, warmth or tenderness in the DIP, PIP, MCP, wrist, elbow, MTP,or knee joints bilaterally. Full ROM of these joints, hips and shoulders bilaterally      LABS   Reviewed as below.     Nov 2023  T4 normal   T3 low, TSH elevated at 4.52    April 2023  CBC - Normal wbc, Hb and plt   CMP - Normal creatinine and LFT     2022  ARABELLA neg   RF neg     2021  HCV neg   HIV neg   Vit D normal   ESR, CRP normal     XR HAND RIGHT G/E 3 VIEWS 4/21/2023 2:07 PM     HISTORY:  Nodule of finger of right hand, finger pain.     COMPARISON: None.                                                                      IMPRESSION: No fracture. Normal alignment. No erosive changes.    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                                                                       IMPRESSION: Since February 10, 2021, heart size is normal. No pleural  effusion, pneumothorax, or abnormal area of consolidation. Scoliotic  curvature of the thoracolumbar spine, apex left.     Narrative & Impression   RIGHT FOOT THREE OR MORE VIEWS  10/5/2021 3:44 PM      HISTORY: Foot swelling.     COMPARISON: None.                                                                      IMPRESSION: No evidence of acute fracture. Normal joint spacing and  alignment.     RIGHT ANKLE THREE OR MORE VIEWS  10/5/2021 3:43 PM      HISTORY: Right ankle and foot pain and swelling.     COMPARISON: None.                                                                      IMPRESSION: Normal joint spacing and alignment. No evidence of acute  fracture.  ASSESSMENT      1. Chronic pain of both ankles    2. Obstructive lung disease (H)    3. Raynaud's disease without gangrene    4. Encounter for screening for other viral diseases    5. Rash        (M25.571,  G89.29,  M25.572) Chronic pain of both ankles  (primary encounter diagnosis)  Comment: Pain and swelling of both ankles as well as the feet since 2021.  She states lately it has been the right ankle that is being more swollen but both ankles do swell up at the same time symptoms as well. RF neg. X ray right hand and ankle normal. She does not have clinical features of lupus, had a neg ARABELLA in the past. Differentials : seroneg RA/SpA/reactive arthritis/sarcoid?  Plan:   Will not initiate any new Rx today.   Recommend additional work up as below.   Orders Placed This Encounter   Procedures    MR Ankle Right w/o  Contrast    MR Ankle Left w/o Contrast    Comprehensive metabolic panel    CRP inflammation    Hepatitis B surface antigen    Hepatitis B Surface Antibody    Hepatitis B core antibody    Cyclic Citrullinated Peptide Antibody IgG    SSA Ro FERMIN Antibody IgG    SSB La FERMIN Antibody IgG    RNP Antibody IgG    DNA double stranded antibodies    Scleroderma Antibody Scl70 FERMIN IgG    Centromere Antibody IgG    Angiotensin converting enzyme    UA with microscopic    Adult Pulmonary Medicine  Referral    CBC with platelets differential    Quantiferon TB Gold Plus       (J44.9) Obstructive lung disease (H)  Comment: noted  Plan: ref to Pulm     (I73.00) Raynaud's disease without gangrene  Comment: no raynaud's on exam   Plan: work up as above     (R21) Rash  Comment: erythematous macular rash noted on back. 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     I spent 66 minutes on the date of the encounter doing chart review, history and exam, documentation and orders per the note.      JOSE ARZOLA MD    Division of Rheumatic & Autoimmune Diseases  [facility]

## 2024-02-26 NOTE — PROGRESS NOTES
2/26/2024    Chief Complaint/Reason for Visit: polyarthralgia    HPI:    Autumn JESSIKA Rosales  is a 48 year old  female with past medical history listed below.  The patient reports the following symptoms going on and off for the last 2 years.  She reports having pain and swelling of both ankles as well as the feet.  She states lately it has been the right ankle that is being more swollen but both ankles do swell up at the same time symptoms as well.  She is also complaining of getting a rash on her face that worsens on exposure to sunlight.  Review of systems positive for dry mouth, difficulty swallowing, fingertips changing color to blue and purple on exposure to cold.  In the past, she was diagnosed with obliterative bronchiolitis and been following up with pulmonology.  She has not seen a pulmonologist in the last 10 years.  She would like to get reestablished with pulmonology at the [facility].  She said she takes ibuprofen 200 mg at night and has tried ice and heatwithout complete relief.    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 Sanches at [place]; lung bx 2007 bronchiolitis and bronchiectasis,; had severe viral pneumonia age 15     "Other congenital anomaly of lung     restrictive/obstructive disease,  30% lung capapcity    Pneumonia     viral pneumonia-age 16yo, was intubated in the ICU    Sinus tachycardia     Temporary low platelet count (H24) 6/12/2014    Thyrotoxicosis without mention of goiter or other cause, without mention of thyrotoxic crisis or storm 2002    Treated with irradiation-on synthroid     Past Surgical History:   Procedure Laterality Date    BIOPSY  2007    lung biopsy    THORACOSCOPIC BIOPSY RIB      x4    thyroid radiation      secondary to graves     Family History   Problem Relation Age of Onset    Diabetes Father     Other Cancer Maternal Grandfather         lung cancer    Diabetes Paternal Aunt     Diabetes Paternal Uncle     Asthma Other     Thyroid Disease Other     Asthma Other     Asthma Paternal Uncle      Social History     Socioeconomic History    Marital status: Single   Tobacco Use    Smoking status: Never    Smokeless tobacco: Never   Substance and Sexual Activity    Alcohol use: No    Drug use: No    Sexual activity: Not Currently   Other Topics Concern    Parent/sibling w/ CABG, MI or angioplasty before 65F 55M? No    Caffeine Concern No     Comment: occasional small size coke     Exercise Yes     Comment: rehab    Seat Belt Yes       Allergies   Allergen Reactions    Sulfa Antibiotics Other (See Comments)     Never taken medication however \"was told to put it down just in case\"       Current Outpatient Medications   Medication    albuterol (PROAIR HFA/PROVENTIL HFA/VENTOLIN HFA) 108 (90 Base) MCG/ACT inhaler    aspirin 325 MG tablet    BENADRYL OR    Cholecalciferol (VITAMIN D PO)    cyclobenzaprine (FLEXERIL) 10 MG tablet    fluocinonide (LIDEX) 0.05 % external solution    fluticasone (FLONASE) 50 MCG/ACT nasal spray    gabapentin (NEURONTIN) 300 MG capsule    hydrocortisone 2.5 % cream    hydrocortisone valerate (WEST-GISELE) 0.2 % ointment    ketoconazole (NIZORAL) 2 % external shampoo    levothyroxine " (SYNTHROID) 112 MCG tablet    metoprolol succinate ER (TOPROL XL) 50 MG 24 hr tablet    phenylephrine (CASTRO-SYNEPHRINE) 0.25 % nasal spray    LORazepam (ATIVAN) 0.5 MG tablet     No current facility-administered medications for this visit.       PHYSICAL EXAM    /78 (BP Location: Right arm, Patient Position: Sitting, Cuff Size: Adult Regular)   Pulse 118   Wt 59.4 kg (131 lb)   LMP 02/21/2024 (Approximate)   SpO2 96%   BMI 23.21 kg/m        General: Alert, No apparent distress   Psych: Affect euthymic  Eyes: Sclera noninjected  Ears, Nose, Throat, Mouth: Oral mucosa moist with normal salivary pool  Neck: No lymphadenopathy  Skin : erythematous macular rash noted on back.   Cardiovascular: Regular rate and rhythm, Normal S1 and S2 and No murmurs, rubs or gallops  Respiratory: Clear to auscultation with no wheezing or crackles  Neuro: Normal gait and Able to arise from seated position unassisted  Musculoskeletal: Hands:  Normal.  Wrists:  Normal.  Elbows:  Normal.  Shoulders:  Normal.  Feet:  Normal.  Ankles:  minimal swelling + right ankle   Knees:  Normal.  Hips:  Normal.  Spine:  Normal.  Tender points:  Negative.  No joint swelling, warmth or tenderness in the DIP, PIP, MCP, wrist, elbow, MTP,or knee joints bilaterally. Full ROM of these joints, hips and shoulders bilaterally      LABS   Reviewed as below.     Nov 2023  T4 normal   T3 low, TSH elevated at 4.52    April 2023  CBC - Normal wbc, Hb and plt   CMP - Normal creatinine and LFT     2022  ARABELLA neg   RF neg     2021  HCV neg   HIV neg   Vit D normal   ESR, CRP normal     XR HAND RIGHT G/E 3 VIEWS 4/21/2023 2:07 PM     HISTORY: Nodule of finger of right hand, finger pain.     COMPARISON: None.                                                                      IMPRESSION: No fracture. Normal alignment. No erosive changes.    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                                                                        IMPRESSION: Since February 10, 2021, heart size is normal. No pleural  effusion, pneumothorax, or abnormal area of consolidation. Scoliotic  curvature of the thoracolumbar spine, apex left.     Narrative & Impression   RIGHT FOOT THREE OR MORE VIEWS  10/5/2021 3:44 PM      HISTORY: Foot swelling.     COMPARISON: None.                                                                      IMPRESSION: No evidence of acute fracture. Normal joint spacing and  alignment.     RIGHT ANKLE THREE OR MORE VIEWS  10/5/2021 3:43 PM      HISTORY: Right ankle and foot pain and swelling.     COMPARISON: None.                                                                      IMPRESSION: Normal joint spacing and alignment. No evidence of acute  fracture.  ASSESSMENT      1. Chronic pain of both ankles    2. Obstructive lung disease (H)    3. Raynaud's disease without gangrene    4. Encounter for screening for other viral diseases    5. Rash        (M25.571,  G89.29,  M25.572) Chronic pain of both ankles  (primary encounter diagnosis)  Comment: Pain and swelling of both ankles as well as the feet since 2021.  She states lately it has been the right ankle that is being more swollen but both ankles do swell up at the same time symptoms as well. RF neg. X ray right hand and ankle normal. She does not have clinical features of lupus, had a neg ARABELLA in the past. Differentials : seroneg RA/SpA/reactive arthritis/sarcoid?  Plan:   Will not initiate any new Rx today.   Recommend additional work up as below.   Orders Placed This Encounter   Procedures    MR Ankle Right w/o Contrast    MR Ankle Left w/o Contrast    Comprehensive metabolic panel    CRP inflammation    Hepatitis B surface antigen    Hepatitis B Surface Antibody    Hepatitis B core antibody    Cyclic Citrullinated Peptide Antibody IgG    SSA Ro FERMIN Antibody IgG    SSB La FERMIN Antibody IgG    RNP Antibody IgG    DNA double stranded  antibodies    Scleroderma Antibody Scl70 FERMIN IgG    Centromere Antibody IgG    Angiotensin converting enzyme    UA with microscopic    Adult Pulmonary Medicine  Referral    CBC with platelets differential    Quantiferon TB Gold Plus       (J44.9) Obstructive lung disease (H)  Comment: noted  Plan: ref to Pulm     (I73.00) Raynaud's disease without gangrene  Comment: no raynaud's on exam   Plan: work up as above     (R21) Rash  Comment: erythematous macular rash noted on back. 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     I spent 66 minutes on the date of the encounter doing chart review, history and exam, documentation and orders per the note.      JOSE ARZOLA MD    Division of Rheumatic & Autoimmune Diseases  [facility]

## 2024-02-26 NOTE — NURSING NOTE
Chief Complaint   Patient presents with    Consult     RA     /78 (BP Location: Right arm, Patient Position: Sitting, Cuff Size: Adult Regular)   Pulse 118   Wt 59.4 kg (131 lb)   LMP 02/21/2024 (Approximate)   SpO2 96%   BMI 23.21 kg/m

## 2024-02-27 DIAGNOSIS — M79.7 FIBROMYALGIA: ICD-10-CM

## 2024-02-27 LAB
CCP AB SER IA-ACNC: 0.9 U/ML
CENTROMERE B AB SER-ACNC: <0.7 U/ML
CENTROMERE B AB SER-ACNC: NEGATIVE
DSDNA AB SER-ACNC: <0.6 IU/ML
ENA SCL70 IGG SER IA-ACNC: <0.6 U/ML
ENA SCL70 IGG SER IA-ACNC: NEGATIVE
ENA SS-A AB SER IA-ACNC: <0.5 U/ML
ENA SS-A AB SER IA-ACNC: NEGATIVE
ENA SS-B IGG SER IA-ACNC: <0.6 U/ML
ENA SS-B IGG SER IA-ACNC: NEGATIVE
GAMMA INTERFERON BACKGROUND BLD IA-ACNC: 0.04 IU/ML
M TB IFN-G BLD-IMP: NEGATIVE
M TB IFN-G CD4+ BCKGRND COR BLD-ACNC: 9.96 IU/ML
MITOGEN IGNF BCKGRD COR BLD-ACNC: 0.01 IU/ML
MITOGEN IGNF BCKGRD COR BLD-ACNC: 0.02 IU/ML
QUANTIFERON MITOGEN: 10 IU/ML
QUANTIFERON NIL TUBE: 0.04 IU/ML
QUANTIFERON TB1 TUBE: 0.06 IU/ML
QUANTIFERON TB2 TUBE: 0.05
U1 SNRNP IGG SER IA-ACNC: 1.1 U/ML
U1 SNRNP IGG SER IA-ACNC: NEGATIVE

## 2024-02-27 RX ORDER — CYCLOBENZAPRINE HCL 10 MG
TABLET ORAL
Qty: 90 TABLET | Refills: 0 | Status: SHIPPED | OUTPATIENT
Start: 2024-02-27 | End: 2024-05-24

## 2024-02-28 LAB — ACE SERPL-CCNC: 42 U/L

## 2024-03-01 DIAGNOSIS — R80.9 PROTEINURIA, UNSPECIFIED TYPE: Primary | ICD-10-CM

## 2024-03-22 ENCOUNTER — PATIENT OUTREACH (OUTPATIENT)
Dept: CARE COORDINATION | Facility: CLINIC | Age: 49
End: 2024-03-22
Payer: MEDICARE

## 2024-03-29 ENCOUNTER — ANCILLARY PROCEDURE (OUTPATIENT)
Dept: MRI IMAGING | Facility: CLINIC | Age: 49
End: 2024-03-29
Attending: INTERNAL MEDICINE
Payer: MEDICARE

## 2024-03-29 PROCEDURE — G1010 CDSM STANSON: HCPCS | Performed by: RADIOLOGY

## 2024-03-29 PROCEDURE — 73721 MRI JNT OF LWR EXTRE W/O DYE: CPT | Mod: RT | Performed by: RADIOLOGY

## 2024-03-29 PROCEDURE — 73721 MRI JNT OF LWR EXTRE W/O DYE: CPT | Mod: LT | Performed by: RADIOLOGY

## 2024-04-01 ENCOUNTER — TELEPHONE (OUTPATIENT)
Dept: RHEUMATOLOGY | Facility: CLINIC | Age: 49
End: 2024-04-01
Payer: MEDICARE

## 2024-04-01 DIAGNOSIS — M25.571 CHRONIC PAIN OF BOTH ANKLES: Primary | ICD-10-CM

## 2024-04-01 DIAGNOSIS — M25.572 CHRONIC PAIN OF BOTH ANKLES: Primary | ICD-10-CM

## 2024-04-01 DIAGNOSIS — G89.29 CHRONIC PAIN OF BOTH ANKLES: Primary | ICD-10-CM

## 2024-04-01 NOTE — TELEPHONE ENCOUNTER
Called pt but no answer. Will send a MyChart message letting her know her provider has placed a ref to Ortho.     Yazmin Abdullahi RN  Adult Rheumatology Clinic

## 2024-04-01 NOTE — TELEPHONE ENCOUNTER
Noted low to moderate grade partial tear deep portion of deltoid  Ligament on MRI right ankle. Placing ref to Ortho.   Pls let the patient know. Thanks.

## 2024-04-05 ENCOUNTER — PATIENT OUTREACH (OUTPATIENT)
Dept: CARE COORDINATION | Facility: CLINIC | Age: 49
End: 2024-04-05
Payer: MEDICARE

## 2024-04-09 ENCOUNTER — OFFICE VISIT (OUTPATIENT)
Dept: RHEUMATOLOGY | Facility: CLINIC | Age: 49
End: 2024-04-09
Attending: INTERNAL MEDICINE
Payer: MEDICARE

## 2024-04-09 ENCOUNTER — LAB (OUTPATIENT)
Dept: LAB | Facility: CLINIC | Age: 49
End: 2024-04-09
Attending: INTERNAL MEDICINE
Payer: MEDICARE

## 2024-04-09 VITALS
SYSTOLIC BLOOD PRESSURE: 120 MMHG | DIASTOLIC BLOOD PRESSURE: 76 MMHG | BODY MASS INDEX: 23.03 KG/M2 | HEART RATE: 121 BPM | OXYGEN SATURATION: 95 % | WEIGHT: 130 LBS

## 2024-04-09 DIAGNOSIS — R21 RASH: ICD-10-CM

## 2024-04-09 DIAGNOSIS — R79.82 CRP ELEVATED: ICD-10-CM

## 2024-04-09 DIAGNOSIS — J98.4 OTHER DISORDERS OF LUNG: ICD-10-CM

## 2024-04-09 DIAGNOSIS — M25.571 CHRONIC PAIN OF BOTH ANKLES: Primary | ICD-10-CM

## 2024-04-09 DIAGNOSIS — G89.29 CHRONIC PAIN OF BOTH ANKLES: Primary | ICD-10-CM

## 2024-04-09 DIAGNOSIS — I73.00 RAYNAUD'S DISEASE WITHOUT GANGRENE: ICD-10-CM

## 2024-04-09 DIAGNOSIS — G89.29 CHRONIC PAIN OF BOTH ANKLES: ICD-10-CM

## 2024-04-09 DIAGNOSIS — J44.9 OBSTRUCTIVE LUNG DISEASE (H): ICD-10-CM

## 2024-04-09 DIAGNOSIS — M25.572 CHRONIC PAIN OF BOTH ANKLES: ICD-10-CM

## 2024-04-09 DIAGNOSIS — M25.571 CHRONIC PAIN OF BOTH ANKLES: ICD-10-CM

## 2024-04-09 DIAGNOSIS — M25.572 CHRONIC PAIN OF BOTH ANKLES: Primary | ICD-10-CM

## 2024-04-09 LAB
CRP SERPL-MCNC: 30.34 MG/L
Lab: NORMAL
PERFORMING LABORATORY: NORMAL
SPECIMEN STATUS: NORMAL
TEST NAME: NORMAL

## 2024-04-09 PROCEDURE — 83516 IMMUNOASSAY NONANTIBODY: CPT

## 2024-04-09 PROCEDURE — G0463 HOSPITAL OUTPT CLINIC VISIT: HCPCS | Performed by: INTERNAL MEDICINE

## 2024-04-09 PROCEDURE — 99215 OFFICE O/P EST HI 40 MIN: CPT | Performed by: INTERNAL MEDICINE

## 2024-04-09 PROCEDURE — 36415 COLL VENOUS BLD VENIPUNCTURE: CPT

## 2024-04-09 PROCEDURE — 83520 IMMUNOASSAY QUANT NOS NONAB: CPT

## 2024-04-09 PROCEDURE — 83516 IMMUNOASSAY NONANTIBODY: CPT | Mod: XU

## 2024-04-09 PROCEDURE — 86140 C-REACTIVE PROTEIN: CPT

## 2024-04-09 ASSESSMENT — PAIN SCALES - GENERAL: PAINLEVEL: NO PAIN (0)

## 2024-04-09 NOTE — PROGRESS NOTES
2/26/2024    Chief Complaint/Reason for Visit: polyarthralgia    HPI:    Autumn JESSIKA Rosales  is a 48 year old  female with past medical history listed below.  She has  been having low grade fever x 2 days, cough with white phlegm, nasal congestion. Neg for sore throat. The fever has now resolved. She is yet to schedule visit with pulmonology. Morning stiffness of hands lasts all day. She does have low back pain as well. Continues to have right ankle swelling. She denied having uveitis, IBD, psoriasis.    REVIEW OF SYSTEMS    General - neg for fever and chills  HEENT - pos for dry mouth, neg for oral or nasal ulcers   Cardiovascular - neg for chest pain  Respiratory - pos for sob, she checked her o2 sats - 89-91.  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 Sanches at [place]; lung bx 2007 bronchiolitis and bronchiectasis,; had severe viral pneumonia age 15    Other congenital anomaly of lung     restrictive/obstructive disease,  30% lung capapcity    Pneumonia     viral pneumonia-age 14yo, was intubated in the ICU    Sinus tachycardia     Temporary low platelet count (H24) 6/12/2014    Thyrotoxicosis without mention of goiter or other cause, without mention of thyrotoxic crisis or storm 2002    Treated with irradiation-on synthroid     Past Surgical History:   Procedure Laterality Date    BIOPSY   "2007    lung biopsy    THORACOSCOPIC BIOPSY RIB      x4    thyroid radiation      secondary to graves     Family History   Problem Relation Age of Onset    Diabetes Father     Other Cancer Maternal Grandfather         lung cancer    Diabetes Paternal Aunt     Diabetes Paternal Uncle     Asthma Other     Thyroid Disease Other     Asthma Other     Asthma Paternal Uncle      Social History     Socioeconomic History    Marital status: Single   Tobacco Use    Smoking status: Never    Smokeless tobacco: Never   Substance and Sexual Activity    Alcohol use: No    Drug use: No    Sexual activity: Not Currently   Other Topics Concern    Parent/sibling w/ CABG, MI or angioplasty before 65F 55M? No    Caffeine Concern No     Comment: occasional small size coke     Exercise Yes     Comment: rehab    Seat Belt Yes       Allergies   Allergen Reactions    Sulfa Antibiotics Other (See Comments)     Never taken medication however \"was told to put it down just in case\"       Current Outpatient Medications   Medication Sig Dispense Refill    albuterol (PROAIR HFA/PROVENTIL HFA/VENTOLIN HFA) 108 (90 Base) MCG/ACT inhaler INHALE 1 TO 2 PUFFS INTO THE LUNGS EVERY 4 HOURS AS NEEDED FOR SHORTNESS OF BREATH OR DIFFICULT BREATHING OR WHEEZING 18 g 0    aspirin 325 MG tablet Take  by mouth daily. Takes 2 tablets as needed      BENADRYL OR AS NEEDED      Cholecalciferol (VITAMIN D PO)       cyclobenzaprine (FLEXERIL) 10 MG tablet TAKE 1/2 TO 1 TABLET(5 TO 10 MG) BY MOUTH THREE TIMES DAILY AS NEEDED FOR MUSCLE SPASMS 90 tablet 0    fluocinonide (LIDEX) 0.05 % external solution       fluticasone (FLONASE) 50 MCG/ACT nasal spray Spray 1 spray into both nostrils daily 16 mL 11    gabapentin (NEURONTIN) 300 MG capsule Take 1 capsule (300 mg) by mouth 3 times daily 90 capsule 3    hydrocortisone 2.5 % cream   2    hydrocortisone valerate (WEST-GISELE) 0.2 % ointment Apply sparingly to affected area three times daily as needed. 45 g 0    ketoconazole " (NIZORAL) 2 % external shampoo       levothyroxine (SYNTHROID) 112 MCG tablet Take 100 mcg by mouth daily       LORazepam (ATIVAN) 0.5 MG tablet TAKE ONE TABLET BY MOUTH DAILY AS NEEDED FOR ANXIETY. MUST LAST 30 DAYS (Patient not taking: Reported on 12/27/2023) 30 tablet 0    metoprolol succinate ER (TOPROL XL) 50 MG 24 hr tablet TAKE 2 TABLETS(100 MG) BY MOUTH DAILY 180 tablet 0    phenylephrine (CASTRO-SYNEPHRINE) 0.25 % nasal spray Spray 1 spray into both nostrils every 4 hours as needed for congestion       No current facility-administered medications for this visit.       PHYSICAL EXAM    /76 (BP Location: Left arm, Patient Position: Sitting, Cuff Size: Adult Regular)   Pulse (!) 121   Wt 59 kg (130 lb)   LMP 02/21/2024 (Approximate)   SpO2 95%   BMI 23.03 kg/m        General: Alert, No apparent distress   Psych: Affect euthymic  Eyes: Sclera noninjected  Ears, Nose, Throat, Mouth: Oral mucosa moist with normal salivary pool  Cardiovascular: Regular rate and rhythm, Normal S1 and S2 and No murmurs, rubs or gallops  Respiratory: wheeze + right base  Neuro: Normal gait and Able to arise from seated position unassisted  Musculoskeletal: Hands:  Normal.  Wrists:  Normal.  Elbows:  Normal.  Shoulders:  Normal.  Feet:  Normal.  Ankles:  minimal swelling + right ankle   Knees:  Normal.          LABS   Reviewed as below.     2024  CCP neg   ACE level normal  FERMIN neg    Nov 2023  T4 normal   T3 low, TSH elevated at 4.52    April 2023  CBC - Normal wbc, Hb and plt   CMP - Normal creatinine and LFT     2022  ARABELLA neg   RF neg     2021  HCV neg   HIV neg   Vit D normal   ESR, CRP normal     XR HAND RIGHT G/E 3 VIEWS 4/21/2023 2:07 PM     HISTORY: Nodule of finger of right hand, finger pain.     COMPARISON: None.                                                                      IMPRESSION: No fracture. Normal alignment. No erosive changes.    CHEST TWO VIEWS  4/21/2023 2:06 PM      HISTORY: 47-year-old woman with  bronchiolitis obliterans.                                                                       IMPRESSION: Since February 10, 2021, heart size is normal. No pleural  effusion, pneumothorax, or abnormal area of consolidation. Scoliotic  curvature of the thoracolumbar spine, apex left.     Narrative & Impression   RIGHT FOOT THREE OR MORE VIEWS  10/5/2021 3:44 PM      HISTORY: Foot swelling.     COMPARISON: None.                                                                      IMPRESSION: No evidence of acute fracture. Normal joint spacing and  alignment.     RIGHT ANKLE THREE OR MORE VIEWS  10/5/2021 3:43 PM      HISTORY: Right ankle and foot pain and swelling.     COMPARISON: None.                                                                      IMPRESSION: Normal joint spacing and alignment. No evidence of acute  fracture.  ASSESSMENT      1. Chronic pain of both ankles    2. Obstructive lung disease (H)    3. Raynaud's disease without gangrene    4. Rash    5. CRP elevated    6. Other disorders of lung          (M25.571,  G89.29,  M25.572) Chronic pain of both ankles  (primary encounter diagnosis)  Comment: Pain and swelling of both ankles as well as the feet since 2021.  She states lately it has been the right ankle that is being more swollen but both ankles do swell up at the same time symptoms as well. RF neg. X ray right hand and ankle normal. She does not have clinical features of lupus, had a neg ARABELLA in the past. Differentials : seroneg RA/SpA/reactive arthritis? MRI left ankle - minimal fluid in the left ankle joint. MRI right ankle - Low to moderate grade partial tear deep portion of deltoid ligament. Mild achilles peritendinitis. At this time, it is not clear if this is a result of the tear noted vs inflammatory arthritis romana in the setting of elevated CRP.  Plan:   Recommend ortho ref for tear of deltoid ligament.   Recommend additional work up as below.   Orders Placed This Encounter    Procedures    [facility]; SFERO; seroneg rad 3 profile (Laboratory Miscellaneous Order)    CRP inflammation       (J44.9) Obstructive lung disease (H)  Comment: noted  Plan: ref to Pulm     (I73.00) Raynaud's disease without gangrene  Comment: no raynaud's on exam   Plan: work up as above     (R21) Rash  Comment: erythematous macular rash noted on back. 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     I spent 43 minutes on the date of the encounter doing chart review, history and exam, documentation and orders per the note.      JOSE ARZOLA MD    Division of Rheumatic & Autoimmune Diseases  [facility]

## 2024-04-09 NOTE — NURSING NOTE
Chief Complaint   Patient presents with    RECHECK     /76 (BP Location: Left arm, Patient Position: Sitting, Cuff Size: Adult Regular)   Pulse (!) 121   Wt 59 kg (130 lb)   LMP 02/21/2024 (Approximate)   SpO2 95%   BMI 23.03 kg/m    Dianna HAN

## 2024-04-09 NOTE — LETTER
4/9/2024       RE: Clemencia Rosales  [address]  [place] [place] [address]     Dear Colleague,    Thank you for referring your patient, Clemencia Rosales, to the [facility] RHEUMATOLOGY at [facility]. Please see a copy of my visit note below.                       2/26/2024    Chief Complaint/Reason for Visit: polyarthralgia    HPI:    Clemencia Rosales  is a 48 year old  female with past medical history listed below.  She has  been having low grade fever x 2 days, cough with white phlegm, nasal congestion. Neg for sore throat. The fever has now resolved. She is yet to schedule visit with pulmonology. Morning stiffness of hands lasts all day. She does have low back pain as well. She denied having uveitis, IBD, psoriasis.    REVIEW OF SYSTEMS    General - neg for fever and chills  HEENT - pos for dry mouth, neg for oral or nasal ulcers   Cardiovascular - neg for chest pain  Respiratory - pos for sob, she checked her o2 sats - 89-91.  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 Sanches at [place]; lung bx 2007 bronchiolitis and bronchiectasis,; had severe viral pneumonia age 15    Other congenital anomaly of lung     restrictive/obstructive disease,  30% lung capapcity    Pneumonia     viral pneumonia-age 16yo, was intubated in the ICU    Sinus  "tachycardia     Temporary low platelet count (H24) 6/12/2014    Thyrotoxicosis without mention of goiter or other cause, without mention of thyrotoxic crisis or storm 2002    Treated with irradiation-on synthroid     Past Surgical History:   Procedure Laterality Date    BIOPSY  2007    lung biopsy    THORACOSCOPIC BIOPSY RIB      x4    thyroid radiation      secondary to graves     Family History   Problem Relation Age of Onset    Diabetes Father     Other Cancer Maternal Grandfather         lung cancer    Diabetes Paternal Aunt     Diabetes Paternal Uncle     Asthma Other     Thyroid Disease Other     Asthma Other     Asthma Paternal Uncle      Social History     Socioeconomic History    Marital status: Single   Tobacco Use    Smoking status: Never    Smokeless tobacco: Never   Substance and Sexual Activity    Alcohol use: No    Drug use: No    Sexual activity: Not Currently   Other Topics Concern    Parent/sibling w/ CABG, MI or angioplasty before 65F 55M? No    Caffeine Concern No     Comment: occasional small size coke     Exercise Yes     Comment: rehab    Seat Belt Yes       Allergies   Allergen Reactions    Sulfa Antibiotics Other (See Comments)     Never taken medication however \"was told to put it down just in case\"       Current Outpatient Medications   Medication Sig Dispense Refill    albuterol (PROAIR HFA/PROVENTIL HFA/VENTOLIN HFA) 108 (90 Base) MCG/ACT inhaler INHALE 1 TO 2 PUFFS INTO THE LUNGS EVERY 4 HOURS AS NEEDED FOR SHORTNESS OF BREATH OR DIFFICULT BREATHING OR WHEEZING 18 g 0    aspirin 325 MG tablet Take  by mouth daily. Takes 2 tablets as needed      BENADRYL OR AS NEEDED      Cholecalciferol (VITAMIN D PO)       cyclobenzaprine (FLEXERIL) 10 MG tablet TAKE 1/2 TO 1 TABLET(5 TO 10 MG) BY MOUTH THREE TIMES DAILY AS NEEDED FOR MUSCLE SPASMS 90 tablet 0    fluocinonide (LIDEX) 0.05 % external solution       fluticasone (FLONASE) 50 MCG/ACT nasal spray Spray 1 spray into both nostrils daily 16 mL " 11    gabapentin (NEURONTIN) 300 MG capsule Take 1 capsule (300 mg) by mouth 3 times daily 90 capsule 3    hydrocortisone 2.5 % cream   2    hydrocortisone valerate (WEST-GISELE) 0.2 % ointment Apply sparingly to affected area three times daily as needed. 45 g 0    ketoconazole (NIZORAL) 2 % external shampoo       levothyroxine (SYNTHROID) 112 MCG tablet Take 100 mcg by mouth daily       LORazepam (ATIVAN) 0.5 MG tablet TAKE ONE TABLET BY MOUTH DAILY AS NEEDED FOR ANXIETY. MUST LAST 30 DAYS (Patient not taking: Reported on 12/27/2023) 30 tablet 0    metoprolol succinate ER (TOPROL XL) 50 MG 24 hr tablet TAKE 2 TABLETS(100 MG) BY MOUTH DAILY 180 tablet 0    phenylephrine (CASTRO-SYNEPHRINE) 0.25 % nasal spray Spray 1 spray into both nostrils every 4 hours as needed for congestion       No current facility-administered medications for this visit.       PHYSICAL EXAM    /76 (BP Location: Left arm, Patient Position: Sitting, Cuff Size: Adult Regular)   Pulse (!) 121   Wt 59 kg (130 lb)   LMP 02/21/2024 (Approximate)   SpO2 95%   BMI 23.03 kg/m        General: Alert, No apparent distress   Psych: Affect euthymic  Eyes: Sclera noninjected  Ears, Nose, Throat, Mouth: Oral mucosa moist with normal salivary pool  Cardiovascular: Regular rate and rhythm, Normal S1 and S2 and No murmurs, rubs or gallops  Respiratory: wheeze + right base  Neuro: Normal gait and Able to arise from seated position unassisted  Musculoskeletal: Hands:  Normal.  Wrists:  Normal.  Elbows:  Normal.  Shoulders:  Normal.  Feet:  Normal.  Ankles:  minimal swelling + right ankle   Knees:  Normal.          LABS   Reviewed as below.     2024  CCP neg   ACE level normal  FERMIN neg    Nov 2023  T4 normal   T3 low, TSH elevated at 4.52    April 2023  CBC - Normal wbc, Hb and plt   CMP - Normal creatinine and LFT     2022  ARABELLA neg   RF neg     2021  HCV neg   HIV neg   Vit D normal   ESR, CRP normal     XR HAND RIGHT G/E 3 VIEWS 4/21/2023 2:07 PM      HISTORY: Nodule of finger of right hand, finger pain.     COMPARISON: None.                                                                      IMPRESSION: No fracture. Normal alignment. No erosive changes.    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                                                                       IMPRESSION: Since February 10, 2021, heart size is normal. No pleural  effusion, pneumothorax, or abnormal area of consolidation. Scoliotic  curvature of the thoracolumbar spine, apex left.     Narrative & Impression   RIGHT FOOT THREE OR MORE VIEWS  10/5/2021 3:44 PM      HISTORY: Foot swelling.     COMPARISON: None.                                                                      IMPRESSION: No evidence of acute fracture. Normal joint spacing and  alignment.     RIGHT ANKLE THREE OR MORE VIEWS  10/5/2021 3:43 PM      HISTORY: Right ankle and foot pain and swelling.     COMPARISON: None.                                                                      IMPRESSION: Normal joint spacing and alignment. No evidence of acute  fracture.  ASSESSMENT      1. Chronic pain of both ankles    2. Obstructive lung disease (H)    3. Raynaud's disease without gangrene    4. Rash    5. CRP elevated    6. Other disorders of lung          (M25.571,  G89.29,  M25.572) Chronic pain of both ankles  (primary encounter diagnosis)  Comment: Pain and swelling of both ankles as well as the feet since 2021.  She states lately it has been the right ankle that is being more swollen but both ankles do swell up at the same time symptoms as well. RF neg. X ray right hand and ankle normal. She does not have clinical features of lupus, had a neg ARABELLA in the past. Differentials : seroneg RA/SpA/reactive arthritis? MRI left ankle - minimal fluid in the left ankle joint. MRI right ankle - Low to moderate grade partial tear deep portion of deltoid ligament. Mild achilles  peritendinitis. At this time, it is not clear if this is a result of the tear noted vs inflammatory arthritis romana in the setting of elevated CRP.  Plan:   Recommend ortho ref for tear of deltoid ligament.   Recommend additional work up as below.   Orders Placed This Encounter   Procedures    [facility]; SF[facility]; seroneg rad 3 profile (Laboratory Miscellaneous Order)    CRP inflammation       (J44.9) Obstructive lung disease (H)  Comment: noted  Plan: ref to Pulm     (I73.00) Raynaud's disease without gangrene  Comment: no raynaud's on exam   Plan: work up as above     (R21) Rash  Comment: erythematous macular rash noted on back. 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     I spent 43 minutes on the date of the encounter doing chart review, history and exam, documentation and orders per the note.      JOSE ARZOLA MD    Division of Rheumatic & Autoimmune Diseases  [facility]

## 2024-04-12 DIAGNOSIS — G89.29 CHRONIC PAIN OF BOTH ANKLES: Primary | ICD-10-CM

## 2024-04-12 DIAGNOSIS — M25.572 CHRONIC PAIN OF BOTH ANKLES: Primary | ICD-10-CM

## 2024-04-12 DIAGNOSIS — M25.571 CHRONIC PAIN OF BOTH ANKLES: Primary | ICD-10-CM

## 2024-04-17 LAB — MAYO MISC RESULT: ABNORMAL

## 2024-04-18 ENCOUNTER — OFFICE VISIT (OUTPATIENT)
Dept: FAMILY MEDICINE | Facility: CLINIC | Age: 49
End: 2024-04-18
Payer: MEDICARE

## 2024-04-18 ENCOUNTER — ANCILLARY PROCEDURE (OUTPATIENT)
Dept: GENERAL RADIOLOGY | Facility: CLINIC | Age: 49
End: 2024-04-18
Attending: FAMILY MEDICINE
Payer: MEDICARE

## 2024-04-18 VITALS
WEIGHT: 127.5 LBS | TEMPERATURE: 98.2 F | HEIGHT: 63 IN | BODY MASS INDEX: 22.59 KG/M2 | OXYGEN SATURATION: 93 % | HEART RATE: 113 BPM | SYSTOLIC BLOOD PRESSURE: 111 MMHG | DIASTOLIC BLOOD PRESSURE: 72 MMHG | RESPIRATION RATE: 20 BRPM

## 2024-04-18 DIAGNOSIS — J44.81 OBLITERATIVE BRONCHIOLITIS (H): ICD-10-CM

## 2024-04-18 DIAGNOSIS — J47.1 BRONCHIECTASIS WITH (ACUTE) EXACERBATION (H): Primary | ICD-10-CM

## 2024-04-18 DIAGNOSIS — J47.1 BRONCHIECTASIS WITH (ACUTE) EXACERBATION (H): ICD-10-CM

## 2024-04-18 DIAGNOSIS — D69.6 TEMPORARY LOW PLATELET COUNT (H): ICD-10-CM

## 2024-04-18 DIAGNOSIS — G44.219 EPISODIC TENSION-TYPE HEADACHE, NOT INTRACTABLE: ICD-10-CM

## 2024-04-18 DIAGNOSIS — E89.0 POSTOPERATIVE HYPOTHYROIDISM: ICD-10-CM

## 2024-04-18 LAB
FLUAV RNA SPEC QL NAA+PROBE: NEGATIVE
FLUBV RNA RESP QL NAA+PROBE: NEGATIVE
RSV RNA SPEC NAA+PROBE: NEGATIVE
SARS-COV-2 RNA RESP QL NAA+PROBE: NEGATIVE

## 2024-04-18 PROCEDURE — 36415 COLL VENOUS BLD VENIPUNCTURE: CPT | Performed by: FAMILY MEDICINE

## 2024-04-18 PROCEDURE — 84443 ASSAY THYROID STIM HORMONE: CPT | Performed by: FAMILY MEDICINE

## 2024-04-18 PROCEDURE — 99213 OFFICE O/P EST LOW 20 MIN: CPT | Performed by: FAMILY MEDICINE

## 2024-04-18 PROCEDURE — 87637 SARSCOV2&INF A&B&RSV AMP PRB: CPT | Performed by: FAMILY MEDICINE

## 2024-04-18 PROCEDURE — 71046 X-RAY EXAM CHEST 2 VIEWS: CPT | Mod: TC | Performed by: RADIOLOGY

## 2024-04-18 ASSESSMENT — ENCOUNTER SYMPTOMS
COUGH: 1
SHORTNESS OF BREATH: 1

## 2024-04-18 ASSESSMENT — LIFESTYLE VARIABLES: SMOKING_STATUS: 0

## 2024-04-18 ASSESSMENT — PAIN SCALES - GENERAL: PAINLEVEL: NO PAIN (0)

## 2024-04-18 NOTE — PROGRESS NOTES
Assessment & Plan     Bronchiectasis with (acute) exacerbation (H)  Known history of Obliterative bronchiolitis (H)  Plan: patient reports she has albuterol inhaler- that she uses as needed   Offered her a short course of prednisone and patient declined.  Pulmonary referral is given  Will inform her if any of the virus positive    - Symptomatic Influenza A/B, RSV, & SARS-CoV2 PCR (COVID-19) Nose; Future  - Adult Pulmonary Medicine  Referral; Future  - XR Chest 2 Views; - no acute pathology- reviewed by me- official report is pending   - Symptomatic Influenza A/B, RSV, & SARS-CoV2 PCR (COVID-19) Nose      Temporary low platelet count (H24)      Postoperative hypothyroidism  Plan: - considered this problem when making today's plans       -followed by specialist.   Patient requested thyroid - tsh  - TSH with free T4 reflex    Ordering of each unique test  Prescription drug management  I spent a total of 20 minutes on the day of the visit.   Time spent by me doing chart review, history and exam, documentation and further activities per the note            Allie Khanna is a 48 year old, presenting for the following health issues:  Cough    History of Present Illness       Reason for visit:  Cough, potential POTS    She eats 2-3 servings of fruits and vegetables daily.She consumes 2 sweetened beverage(s) daily.She exercises with enough effort to increase her heart rate 10 to 19 minutes per day.  She exercises with enough effort to increase her heart rate 7 days per week.   She is taking medications regularly.  Cough  This is a recurrent problem. The current episode started more than 1 week ago. The problem occurs every few minutes. The problem has been gradually improving. The cough is Productive of sputum (light yellow/green). The maximum temperature recorded prior to her arrival was 100 to 100.9 F. The fever has been present for 1 to 2 days. Associated symptoms include shortness of breath. She has  "tried decongestants and cough syrup for the symptoms. The treatment provided mild (nyquil, benadryl) relief. Risk factors: Exposure to Pnemonia, COVID and strep per mom. She is not a smoker. Her past medical history is significant for bronchitis, pneumonia and bronchiectasis. Past medical history comments: obliterative bronchiolitis.    Known history of OB  Has always had shortness of breath since age 15  Noted more upper respiratiry tract infection symptoms a couple weeks  Was exposed to her mom- who had traveled and later hospitalized for pneumonia.mom is doing well  She also reports that for past 2 days coughing actually has improved and she is worried/concerned if she has RSV  Appetite is good.  Lower grade fever 2 weeks ago. No chills, no unusual fatigue     She wants to have her TSH also rechecked- - tsh can fluctuate with ailments   She does see endo for levothyroxine           Review of Systems  Constitutional, HEENT, cardiovascular, pulmonary, GI, , musculoskeletal, neuro, skin, endocrine and psych systems are negative, except as otherwise noted.      Objective    /72 (BP Location: Left arm, Patient Position: Sitting, Cuff Size: Adult Regular)   Pulse 113   Temp 98.2  F (36.8  C) (Temporal)   Resp 20   Ht 1.595 m (5' 2.8\")   Wt 57.8 kg (127 lb 8 oz)   LMP  (LMP Unknown)   SpO2 93%   BMI 22.73 kg/m    Body mass index is 22.73 kg/m .  Physical Exam   GENERAL: alert and no distress  HENT: ear canals and TM's normal, nose and mouth without ulcers or lesions  NECK: no adenopathy, no asymmetry, masses, or scars  RESP: lungs clear to auscultation - no rales, rhonchi or wheezes  CV: regular rate and rhythm, normal S1 S2, no S3 or S4, no murmur, click or rub, no peripheral edema  ABDOMEN: soft, nontender, no hepatosplenomegaly, no masses and bowel sounds normal  MS: no gross musculoskeletal defects noted, no edema    No results found for this or any previous visit (from the past 24 hour(s)).    "     Signed Electronically by: Zayda Shahid MD

## 2024-04-19 LAB — TSH SERPL DL<=0.005 MIU/L-ACNC: 2.65 UIU/ML (ref 0.3–4.2)

## 2024-04-25 ENCOUNTER — TELEPHONE (OUTPATIENT)
Dept: RHEUMATOLOGY | Facility: CLINIC | Age: 49
End: 2024-04-25
Payer: MEDICARE

## 2024-04-26 DIAGNOSIS — G89.29 CHRONIC PAIN OF BOTH ANKLES: Primary | ICD-10-CM

## 2024-04-26 DIAGNOSIS — M25.571 CHRONIC PAIN OF BOTH ANKLES: Primary | ICD-10-CM

## 2024-04-26 DIAGNOSIS — M25.572 CHRONIC PAIN OF BOTH ANKLES: Primary | ICD-10-CM

## 2024-04-30 ENCOUNTER — MYC MEDICAL ADVICE (OUTPATIENT)
Dept: FAMILY MEDICINE | Facility: CLINIC | Age: 49
End: 2024-04-30
Payer: MEDICARE

## 2024-04-30 ENCOUNTER — E-VISIT (OUTPATIENT)
Dept: FAMILY MEDICINE | Facility: CLINIC | Age: 49
End: 2024-04-30
Payer: MEDICARE

## 2024-04-30 DIAGNOSIS — R05.1 ACUTE COUGH: Primary | ICD-10-CM

## 2024-04-30 PROCEDURE — 99421 OL DIG E/M SVC 5-10 MIN: CPT | Performed by: PHYSICIAN ASSISTANT

## 2024-04-30 RX ORDER — BENZONATATE 100 MG/1
100-200 CAPSULE ORAL 3 TIMES DAILY PRN
Qty: 30 CAPSULE | Refills: 0 | Status: SHIPPED | OUTPATIENT
Start: 2024-04-30

## 2024-04-30 RX ORDER — PREDNISONE 20 MG/1
40 TABLET ORAL DAILY
Qty: 10 TABLET | Refills: 0 | Status: CANCELLED | OUTPATIENT
Start: 2024-04-30 | End: 2024-05-05

## 2024-05-09 ENCOUNTER — VIRTUAL VISIT (OUTPATIENT)
Dept: RHEUMATOLOGY | Facility: CLINIC | Age: 49
End: 2024-05-09
Attending: INTERNAL MEDICINE
Payer: MEDICARE

## 2024-05-09 ENCOUNTER — MYC REFILL (OUTPATIENT)
Dept: FAMILY MEDICINE | Facility: CLINIC | Age: 49
End: 2024-05-09
Payer: MEDICARE

## 2024-05-09 ENCOUNTER — MYC MEDICAL ADVICE (OUTPATIENT)
Dept: FAMILY MEDICINE | Facility: CLINIC | Age: 49
End: 2024-05-09
Payer: MEDICARE

## 2024-05-09 DIAGNOSIS — R79.82 CRP ELEVATED: ICD-10-CM

## 2024-05-09 DIAGNOSIS — M79.7 FIBROMYALGIA: ICD-10-CM

## 2024-05-09 DIAGNOSIS — Z86.79 H/O SINUS TACHYCARDIA: ICD-10-CM

## 2024-05-09 DIAGNOSIS — E05.00 GRAVES DISEASE: ICD-10-CM

## 2024-05-09 DIAGNOSIS — G50.0 TRIGEMINAL NEURALGIA: ICD-10-CM

## 2024-05-09 DIAGNOSIS — Z71.85 VACCINE COUNSELING: ICD-10-CM

## 2024-05-09 DIAGNOSIS — M13.80 SERONEGATIVE ARTHRITIS: Primary | ICD-10-CM

## 2024-05-09 DIAGNOSIS — L30.9 DERMATITIS: ICD-10-CM

## 2024-05-09 RX ORDER — FOLIC ACID 1 MG/1
1 TABLET ORAL DAILY
Qty: 90 TABLET | Refills: 3 | Status: SHIPPED | OUTPATIENT
Start: 2024-05-09

## 2024-05-09 RX ORDER — METOPROLOL SUCCINATE 50 MG/1
100 TABLET, EXTENDED RELEASE ORAL DAILY
Qty: 180 TABLET | Refills: 0 | OUTPATIENT
Start: 2024-05-09

## 2024-05-09 RX ORDER — METOPROLOL SUCCINATE 50 MG/1
TABLET, EXTENDED RELEASE ORAL
Qty: 180 TABLET | Refills: 0 | Status: SHIPPED | OUTPATIENT
Start: 2024-05-09 | End: 2024-08-05

## 2024-05-09 RX ORDER — METHOTREXATE 2.5 MG/1
15 TABLET ORAL
Qty: 24 TABLET | Refills: 2 | Status: SHIPPED | OUTPATIENT
Start: 2024-05-09 | End: 2024-08-26

## 2024-05-09 NOTE — PROGRESS NOTES
Medication Therapy Management (MTM) Encounter    ASSESSMENT:                            Medication Adherence/Access: No issues identified.    Seronegative inflammatory arthritis: Patient's symptoms are insufficiently controlled, recent diagnosis of inflammatory arthritis. Would benefit from starting disease-modifying medication methotrexate as directed. Note interaction with ibuprofen and aspirin due to increased risk of methotrexate side effects, this interaction is less likely to be significant at low doses used for arthritis but would benefit from monitoring for side effects. Education provided today including administration, monitoring, common and serious side effects (including risk of infection), and time to effectiveness.    Vaccine counseling: Indicated for Shingrix per ACIP recommendations for patients on immunosuppressing medications.    Fibromyalgia/trigeminal neuralgia: Symptoms are sufficiently controlled with gabapentin and as needed cyclobenzaprine. No changes needed at this time.    Dermatitis: Symptoms are sufficiently controlled with topical medications, no changes needed at this time.    Grave's disease: Patient reports labile TSH levels, is administering levothyroxine appropriately. Last TSH in goal range, no changes needed at this time.    PLAN:                            Start methotrexate 15 mg weekly and folic acid 1 mg daily. Report any side effects, infections, or planned surgeries to the rheumatology department.  We will follow up in 4 weeks to assess tolerability and update labs.  Consider the following vaccines: Shingrix.    Follow-up: 4 weeks for tolerability and lab update. Sees Dr. Espinoza 7/8/24.    SUBJECTIVE/OBJECTIVE:                          Clemencia Rosales is a 48 year old female called for an initial visit. She was referred to me from Larissa Espinoza MD.    Reason for visit: Methotrexate start.    Allergies/ADRs: Reviewed in chart  Past Medical History: Reviewed in chart  Tobacco: She  "reports that she has never smoked. She has never used smokeless tobacco.  Alcohol: does not drink    Medication Adherence/Access: No issues identified.    Seronegative inflammatory arthritis: Has significant swelling in both ankles \"they look like tennis balls.\" Also with widespread joint pain and stiffness. Takes one ibuprofen and one aspirin daily, sometimes an additional ibuprofen if needed. Prefers to take methotrexate by mouth but would be open to injections in the future.    Component      Latest Ref Rn 2/26/2024  3:34 PM   WBC      4.0 - 11.0 10e3/uL 7.7    RBC Count      3.80 - 5.20 10e6/uL 4.69    Hemoglobin      11.7 - 15.7 g/dL 13.7    Hematocrit      35.0 - 47.0 % 42.3    MCV      78 - 100 fL 90    MCH      26.5 - 33.0 pg 29.2    MCHC      31.5 - 36.5 g/dL 32.4    RDW      10.0 - 15.0 % 12.8    Platelet Count      150 - 450 10e3/uL 177    % Neutrophils      % 67    % Lymphocytes      % 21    % Monocytes      % 7    % Eosinophils      % 4    % Basophils      % 1    % Immature Granulocytes      % 0    NRBCs per 100 WBC      <1 /100 0    Absolute Neutrophils      1.6 - 8.3 10e3/uL 5.1    Absolute Lymphocytes      0.8 - 5.3 10e3/uL 1.7    Absolute Monocytes      0.0 - 1.3 10e3/uL 0.6    Absolute Eosinophils      0.0 - 0.7 10e3/uL 0.3    Absolute Basophils      0.0 - 0.2 10e3/uL 0.1    Absolute Immature Granulocytes      <=0.4 10e3/uL 0.0    Absolute NRBCs      10e3/uL 0.0    Sodium      135 - 145 mmol/L 140    Potassium      3.4 - 5.3 mmol/L 4.4    Carbon Dioxide (CO2)      22 - 29 mmol/L 27    Anion Gap      7 - 15 mmol/L 9    Urea Nitrogen      6.0 - 20.0 mg/dL 13.9    Creatinine      0.51 - 0.95 mg/dL 0.86    GFR Estimate      >60 mL/min/1.73m2 83    Calcium      8.6 - 10.0 mg/dL 8.4 (L)    Chloride      98 - 107 mmol/L 104    Glucose      70 - 99 mg/dL 101 (H)    Alkaline Phosphatase      40 - 150 U/L 56    AST      0 - 45 U/L 24    ALT      0 - 50 U/L 16    Protein Total      6.4 - 8.3 g/dL 7.3  "   Albumin      3.5 - 5.2 g/dL 4.3    Bilirubin Total      <=1.2 mg/dL 0.2    Quantiferon-TB Gold Plus Result      Negative  Negative   TB1 Ag minus Nil Value      IU/mL 0.02    TB2 Ag minus Nil Value      IU/mL 0.01    Mitogen minus Nil Result      IU/mL 9.96    Nil Result      IU/mL 0.04    Hepatitis B Surface Antibody Nonreactive    Hepatitis B Surface Antibody Instrument Value      <8.5 m[IU]/mL <3.50    CRP Inflammation      <5.00 mg/L 9.17 (H)    Hep B Surface Agn      Nonreactive  Nonreactive    Hepatitis B Core Areli      Nonreactive  Nonreactive    Quantiferon Nil Tube      IU/mL 0.04    Quantiferon TB1 Tube      IU/mL 0.06    Quantiferon TB2 Tube 0.05    QUANTIFERON MITOGEN      IU/mL 10.00       Vaccine counseling: Open to receiving recommended vaccines.    Immunization History   Administered Date(s) Administered    COVID-19 12+ (2023-24) (Pfizer) 11/06/2023    COVID-19 Bivalent 18+ (Moderna) 11/11/2022    COVID-19 MONOVALENT 12+ (Pfizer) 05/04/2021, 05/25/2021    COVID-19 Monovalent 12+ (Pfizer 2022) 02/25/2022    Influenza (H1N1) 12/23/2009    Influenza (IIV3) PF 12/02/2005, 11/07/2007, 11/14/2008, 09/09/2009, 10/05/2010, 09/15/2011, 09/17/2012, 10/05/2014    Influenza Vaccine >6 months,quad, PF 10/18/2013, 09/20/2016, 10/10/2017, 11/02/2018, 10/05/2021, 11/28/2022, 11/17/2023    Influenza Vaccine, 6+MO IM (QUADRIVALENT W/PRESERVATIVES) 10/30/2015, 10/03/2019, 11/05/2020    Influenza, seasonal, injectable, PF 10/20/2015    Pneumococcal 20 valent Conjugate (Prevnar 20) 01/23/2023    Pneumococcal 23 valent 03/19/2014    TDAP (Adacel,Boostrix) 04/21/2023    TDAP Vaccine (Adacel) 01/04/2012      Fibromyalgia/trigeminal neuralgia:   Cyclobenzaprine 10 mg as needed  Gabapentin 300 mg three times daily    Uses gabapentin once daily, higher doses were making her too tired. Takes cyclobenzaprine as needed. Both are helpful.    Dermatitis:  Fluocinonide 0.05% solution  Hydrocortisone 2.5% cream  Hydrocortisone 0.2%  ointment  Ketoconazole 2% shampoo    Uses topicals for dermatitis on face/scalp.    Grave's disease:  Levothyroxine 112 mcg daily  Metoprolol 50 mg daily    Patient reports TSH can be hard to control, managed by primary provider. Takes the metoprolol to control heart rate on Grave's disease.    TSH   Date Value Ref Range Status   04/18/2024 2.65 0.30 - 4.20 uIU/mL Final   10/20/2022 3.15 0.40 - 4.00 mU/L Final   06/06/2019 1.99 0.40 - 4.00 mU/L Final      Today's Vitals: none taken today  ----------------    I spent 60 minutes with this patient today. All changes were made via collaborative practice agreement with Larissa Espinoza MD. A copy of the visit note was provided to the patient's provider(s).    A summary of these recommendations was sent via [employer].    Glory Quiroz, PharmD  Medication Therapy Management Pharmacist  [facility]     Telemedicine Visit Details  Type of service:  Telephone visit  Start Time:  1430  End Time:  1530     Medication Therapy Recommendations  No medication therapy recommendations to display

## 2024-05-09 NOTE — Clinical Note
Ordered methotrexate 15 mg weekly and folic acid as we discussed, education provided. I will follow up with her in 4 weeks for tolerability and labs. Thanks!

## 2024-05-09 NOTE — Clinical Note
"5/9/2024       RE: Clemencia Rosales  [address]  [facility] [address]     Dear Colleague,    Thank you for referring your patient, Clemencia Rosales, to the [facility] [place] at [facility]. Please see a copy of my visit note below.    Medication Therapy Management (MTM) Encounter    ASSESSMENT:                            Medication Adherence/Access: No issues identified.    Seronegative inflammatory arthritis: ***     Vaccine counseling: Indicated for Shingrix per ACIP recommendations for patients on     Fibromyalgia/trigeminal neuralgia: ***    Dermatitis: ***    Grave's disease: ***    PLAN:                            ***    Follow-up: 4 weeks for tolerability and lab update. Sees Dr. Espinoza 7/8/24.    SUBJECTIVE/OBJECTIVE:                          Clemencia Rosales is a 48 year old female called for an initial visit. She was referred to me from Larissa Espinoza MD.    Reason for visit: Methotrexate start.    Allergies/ADRs: Reviewed in chart  Past Medical History: Reviewed in chart  Tobacco: She reports that she has never smoked. She has never used smokeless tobacco.  Alcohol: does not drink    Medication Adherence/Access: No issues identified.    Seronegative inflammatory arthritis: Has significant swelling in both ankles \"they look like tennis balls.\" Takes one ibuprofen and one aspirin daily, sometimes an additional ibuprofen. Prefers by mouth. ***    Component      Latest Ref Rng 2/26/2024  3:34 PM   WBC      4.0 - 11.0 10e3/uL 7.7    RBC Count      3.80 - 5.20 10e6/uL 4.69    Hemoglobin      11.7 - 15.7 g/dL 13.7    Hematocrit      35.0 - 47.0 % 42.3    MCV      78 - 100 fL 90    MCH      26.5 - 33.0 pg 29.2    MCHC      31.5 - 36.5 g/dL 32.4    RDW      10.0 - 15.0 % 12.8    Platelet Count      150 - 450 10e3/uL 177    % Neutrophils      % 67    % Lymphocytes      % 21    % Monocytes      % 7    % Eosinophils      % 4    % " Basophils      % 1    % Immature Granulocytes      % 0    NRBCs per 100 WBC      <1 /100 0    Absolute Neutrophils      1.6 - 8.3 10e3/uL 5.1    Absolute Lymphocytes      0.8 - 5.3 10e3/uL 1.7    Absolute Monocytes      0.0 - 1.3 10e3/uL 0.6    Absolute Eosinophils      0.0 - 0.7 10e3/uL 0.3    Absolute Basophils      0.0 - 0.2 10e3/uL 0.1    Absolute Immature Granulocytes      <=0.4 10e3/uL 0.0    Absolute NRBCs      10e3/uL 0.0    Sodium      135 - 145 mmol/L 140    Potassium      3.4 - 5.3 mmol/L 4.4    Carbon Dioxide (CO2)      22 - 29 mmol/L 27    Anion Gap      7 - 15 mmol/L 9    Urea Nitrogen      6.0 - 20.0 mg/dL 13.9    Creatinine      0.51 - 0.95 mg/dL 0.86    GFR Estimate      >60 mL/min/1.73m2 83    Calcium      8.6 - 10.0 mg/dL 8.4 (L)    Chloride      98 - 107 mmol/L 104    Glucose      70 - 99 mg/dL 101 (H)    Alkaline Phosphatase      40 - 150 U/L 56    AST      0 - 45 U/L 24    ALT      0 - 50 U/L 16    Protein Total      6.4 - 8.3 g/dL 7.3    Albumin      3.5 - 5.2 g/dL 4.3    Bilirubin Total      <=1.2 mg/dL 0.2    Quantiferon-TB Gold Plus Result      Negative  Negative   TB1 Ag minus Nil Value      IU/mL 0.02    TB2 Ag minus Nil Value      IU/mL 0.01    Mitogen minus Nil Result      IU/mL 9.96    Nil Result      IU/mL 0.04    Hepatitis B Surface Antibody Nonreactive    Hepatitis B Surface Antibody Instrument Value      <8.5 m[IU]/mL <3.50    CRP Inflammation      <5.00 mg/L 9.17 (H)    Hep B Surface Agn      Nonreactive  Nonreactive    Hepatitis B Core Areli      Nonreactive  Nonreactive    Quantiferon Nil Tube      IU/mL 0.04    Quantiferon TB1 Tube      IU/mL 0.06    Quantiferon TB2 Tube 0.05    QUANTIFERON MITOGEN      IU/mL 10.00       Vaccine counseling: Open to receiving recommended vaccines.    Immunization History   Administered Date(s) Administered    COVID-19 12+ (2023-24) (Pfizer) 11/06/2023    COVID-19 Bivalent 18+ (Moderna) 11/11/2022    COVID-19 MONOVALENT 12+ (Pfizer) 05/04/2021,  05/25/2021    COVID-19 Monovalent 12+ (Pfizer 2022) 02/25/2022    Influenza (H1N1) 12/23/2009    Influenza (IIV3) PF 12/02/2005, 11/07/2007, 11/14/2008, 09/09/2009, 10/05/2010, 09/15/2011, 09/17/2012, 10/05/2014    Influenza Vaccine >6 months,quad, PF 10/18/2013, 09/20/2016, 10/10/2017, 11/02/2018, 10/05/2021, 11/28/2022, 11/17/2023    Influenza Vaccine, 6+MO IM (QUADRIVALENT W/PRESERVATIVES) 10/30/2015, 10/03/2019, 11/05/2020    Influenza, seasonal, injectable, PF 10/20/2015    Pneumococcal 20 valent Conjugate (Prevnar 20) 01/23/2023    Pneumococcal 23 valent 03/19/2014    TDAP (Adacel,Boostrix) 04/21/2023    TDAP Vaccine (Adacel) 01/04/2012      Fibromyalgia/trigeminal neuralgia:   Cyclobenzaprine 10 mg as needed  Gabapentin 300 mg three times daily    Uses gabapentin once daily, higher doses were making her too tired. Takes cyclobenzaprine as needed. Both are helpful.    Dermatitis:  Fluocinonide 0.05% solution  Hydrocortisone 2.5% cream  Hydrocortisone 0.2% ointment  Ketoconazole 2% shampoo    Uses topicals for dermatitis on face/scalp.    Grave's disease:  Levothyroxine 112 mcg daily  Metoprolol 50 mg daily    Patient reports TSH can be hard to control, managed by primary provider. Takes the metoprolol to control heart rate on Grave's disease.    BP Readings from Last 3 Encounters:   04/18/24 111/72   04/09/24 120/76   02/26/24 114/78      Today's Vitals: none taken today  ----------------    I spent 60 minutes with this patient today. All changes were made via collaborative practice agreement with Larissa Espinoza MD. A copy of the visit note was provided to the patient's provider(s).    A summary of these recommendations was sent via [employer].    Glory Quiroz, PharmD  Medication Therapy Management Pharmacist  [facility]     Telemedicine Visit Details  Type of service:  Telephone visit  Start Time:  1430  End Time:  1530     Medication Therapy Recommendations  No medication therapy  recommendations to display     Medication Therapy Management (MTM) Encounter    ASSESSMENT:                            Medication Adherence/Access: No issues identified.    Seronegative inflammatory arthritis: Patient's symptoms are insufficiently controlled, recent diagnosis of inflammatory arthritis. Would benefit from starting disease-modifying medication methotrexate as directed. Note interaction with ibuprofen and aspirin due to increased risk of methotrexate side effects, this interaction is less likely to be significant at low doses used for arthritis but would benefit from monitoring for side effects. Education provided today including administration, monitoring, common and serious side effects (including risk of infection), and time to effectiveness.    Vaccine counseling: Indicated for Shingrix per ACIP recommendations for patients on immunosuppressing medications.    Fibromyalgia/trigeminal neuralgia: Symptoms are sufficiently controlled with gabapentin and as needed cyclobenzaprine. No changes needed at this time.    Dermatitis: Symptoms are sufficiently controlled with topical medications, no changes needed at this time.    Grave's disease: Patient reports labile TSH levels, is administering levothyroxine appropriately. Last TSH in goal range, no changes needed at this time.    PLAN:                            Start methotrexate 15 mg weekly and folic acid 1 mg daily. Report any side effects, infections, or planned surgeries to the rheumatology department.  We will follow up in 4 weeks to assess tolerability and update labs.  Consider the following vaccines: Shingrix.    Follow-up: 4 weeks for tolerability and lab update. Sees Dr. Espinoza 7/8/24.    SUBJECTIVE/OBJECTIVE:                          Clemencia Rosales is a 48 year old female called for an initial visit. She was referred to me from Larissa Espinoza MD.    Reason for visit: Methotrexate start.    Allergies/ADRs: Reviewed in chart  Past Medical History:  "Reviewed in chart  Tobacco: She reports that she has never smoked. She has never used smokeless tobacco.  Alcohol: does not drink    Medication Adherence/Access: No issues identified.    Seronegative inflammatory arthritis: Has significant swelling in both ankles \"they look like tennis balls.\" Also with widespread joint pain and stiffness. Takes one ibuprofen and one aspirin daily, sometimes an additional ibuprofen if needed. Prefers to take methotrexate by mouth but would be open to injections in the future.    Component      Latest Ref Rng 2/26/2024  3:34 PM   WBC      4.0 - 11.0 10e3/uL 7.7    RBC Count      3.80 - 5.20 10e6/uL 4.69    Hemoglobin      11.7 - 15.7 g/dL 13.7    Hematocrit      35.0 - 47.0 % 42.3    MCV      78 - 100 fL 90    MCH      26.5 - 33.0 pg 29.2    MCHC      31.5 - 36.5 g/dL 32.4    RDW      10.0 - 15.0 % 12.8    Platelet Count      150 - 450 10e3/uL 177    % Neutrophils      % 67    % Lymphocytes      % 21    % Monocytes      % 7    % Eosinophils      % 4    % Basophils      % 1    % Immature Granulocytes      % 0    NRBCs per 100 WBC      <1 /100 0    Absolute Neutrophils      1.6 - 8.3 10e3/uL 5.1    Absolute Lymphocytes      0.8 - 5.3 10e3/uL 1.7    Absolute Monocytes      0.0 - 1.3 10e3/uL 0.6    Absolute Eosinophils      0.0 - 0.7 10e3/uL 0.3    Absolute Basophils      0.0 - 0.2 10e3/uL 0.1    Absolute Immature Granulocytes      <=0.4 10e3/uL 0.0    Absolute NRBCs      10e3/uL 0.0    Sodium      135 - 145 mmol/L 140    Potassium      3.4 - 5.3 mmol/L 4.4    Carbon Dioxide (CO2)      22 - 29 mmol/L 27    Anion Gap      7 - 15 mmol/L 9    Urea Nitrogen      6.0 - 20.0 mg/dL 13.9    Creatinine      0.51 - 0.95 mg/dL 0.86    GFR Estimate      >60 mL/min/1.73m2 83    Calcium      8.6 - 10.0 mg/dL 8.4 (L)    Chloride      98 - 107 mmol/L 104    Glucose      70 - 99 mg/dL 101 (H)    Alkaline Phosphatase      40 - 150 U/L 56    AST      0 - 45 U/L 24    ALT      0 - 50 U/L 16    Protein " Total      6.4 - 8.3 g/dL 7.3    Albumin      3.5 - 5.2 g/dL 4.3    Bilirubin Total      <=1.2 mg/dL 0.2    Quantiferon-TB Gold Plus Result      Negative  Negative   TB1 Ag minus Nil Value      IU/mL 0.02    TB2 Ag minus Nil Value      IU/mL 0.01    Mitogen minus Nil Result      IU/mL 9.96    Nil Result      IU/mL 0.04    Hepatitis B Surface Antibody Nonreactive    Hepatitis B Surface Antibody Instrument Value      <8.5 m[IU]/mL <3.50    CRP Inflammation      <5.00 mg/L 9.17 (H)    Hep B Surface Agn      Nonreactive  Nonreactive    Hepatitis B Core Areli      Nonreactive  Nonreactive    Quantiferon Nil Tube      IU/mL 0.04    Quantiferon TB1 Tube      IU/mL 0.06    Quantiferon TB2 Tube 0.05    QUANTIFERON MITOGEN      IU/mL 10.00       Vaccine counseling: Open to receiving recommended vaccines.    Immunization History   Administered Date(s) Administered     COVID-19 12+ (2023-24) (Pfizer) 11/06/2023     COVID-19 Bivalent 18+ (Moderna) 11/11/2022     COVID-19 MONOVALENT 12+ (Pfizer) 05/04/2021, 05/25/2021     COVID-19 Monovalent 12+ (Pfizer 2022) 02/25/2022     Influenza (H1N1) 12/23/2009     Influenza (IIV3) PF 12/02/2005, 11/07/2007, 11/14/2008, 09/09/2009, 10/05/2010, 09/15/2011, 09/17/2012, 10/05/2014     Influenza Vaccine >6 months,quad, PF 10/18/2013, 09/20/2016, 10/10/2017, 11/02/2018, 10/05/2021, 11/28/2022, 11/17/2023     Influenza Vaccine, 6+MO IM (QUADRIVALENT W/PRESERVATIVES) 10/30/2015, 10/03/2019, 11/05/2020     Influenza, seasonal, injectable, PF 10/20/2015     Pneumococcal 20 valent Conjugate (Prevnar 20) 01/23/2023     Pneumococcal 23 valent 03/19/2014     TDAP (Adacel,Boostrix) 04/21/2023     TDAP Vaccine (Adacel) 01/04/2012      Fibromyalgia/trigeminal neuralgia:   Cyclobenzaprine 10 mg as needed  Gabapentin 300 mg three times daily    Uses gabapentin once daily, higher doses were making her too tired. Takes cyclobenzaprine as needed. Both are helpful.    Dermatitis:  Fluocinonide 0.05%  solution  Hydrocortisone 2.5% cream  Hydrocortisone 0.2% ointment  Ketoconazole 2% shampoo    Uses topicals for dermatitis on face/scalp.    Grave's disease:  Levothyroxine 112 mcg daily  Metoprolol 50 mg daily    Patient reports TSH can be hard to control, managed by primary provider. Takes the metoprolol to control heart rate on Grave's disease.    TSH   Date Value Ref Range Status   04/18/2024 2.65 0.30 - 4.20 uIU/mL Final   10/20/2022 3.15 0.40 - 4.00 mU/L Final   06/06/2019 1.99 0.40 - 4.00 mU/L Final      Today's Vitals: none taken today  ----------------    I spent 60 minutes with this patient today. All changes were made via collaborative practice agreement with Larissa Espinoza MD. A copy of the visit note was provided to the patient's provider(s).    A summary of these recommendations was sent via [employer].    Glory Quiroz, PharmD  Medication Therapy Management Pharmacist  [facility]     Telemedicine Visit Details  Type of service:  Telephone visit  Start Time:  1430  End Time:  1530     Medication Therapy Recommendations  No medication therapy recommendations to display       Again, thank you for allowing me to participate in the care of your patient.      Sincerely,    Glory Quiroz, formerly [facility]

## 2024-05-09 NOTE — PATIENT INSTRUCTIONS
"Recommendations from today's MTM visit:                                                      Start methotrexate 15 mg weekly and folic acid 1 mg daily. Report any side effects, infections, or planned surgeries to the rheumatology department.  We will follow up in 4 weeks to assess tolerability and update labs.  Consider the following vaccines: Shingrix.    Follow-up: 4 weeks for tolerability and lab update. Sees Dr. Espinoza 7/8/24.    It was great speaking with you today.  I value your experience and would be very thankful for your time in providing feedback in our clinic survey. In the next few days, you may receive an email or text message from [employer] with a link to a survey related to your  clinical pharmacist.\"     To schedule another MTM appointment, please call the clinic directly or you may call the MTM scheduling line at [phone] or toll-free at [phone].     My Clinical Pharmacist's contact information:                                                      Please feel free to contact me with any questions or concerns you have.      Glory Quiroz, PharmD  Medication Therapy Management Pharmacist  [facility]    "

## 2024-05-16 DIAGNOSIS — J84.9 ILD (INTERSTITIAL LUNG DISEASE) (H): Primary | ICD-10-CM

## 2024-05-24 DIAGNOSIS — M79.7 FIBROMYALGIA: ICD-10-CM

## 2024-05-24 RX ORDER — CYCLOBENZAPRINE HCL 10 MG
TABLET ORAL
Qty: 90 TABLET | Refills: 0 | Status: SHIPPED | OUTPATIENT
Start: 2024-05-24 | End: 2024-08-05

## 2024-05-25 ENCOUNTER — HEALTH MAINTENANCE LETTER (OUTPATIENT)
Age: 49
End: 2024-05-25

## 2024-06-05 ENCOUNTER — TELEPHONE (OUTPATIENT)
Dept: RHEUMATOLOGY | Facility: CLINIC | Age: 49
End: 2024-06-05
Payer: MEDICARE

## 2024-06-05 NOTE — TELEPHONE ENCOUNTER
Took methotrexate for the first time last week. Did not have any side effects that she has noticed. Writer will follow up after she has been on it for 4 weeks to reassess tolerability and labs. She is wondering about MRI finding for 8 mm cyst, she is wondering if she needs any follow up for this. Will forward to Dr. Espinoza for input.

## 2024-06-11 DIAGNOSIS — J84.9 ILD (INTERSTITIAL LUNG DISEASE) (H): Primary | ICD-10-CM

## 2024-06-27 ENCOUNTER — VIRTUAL VISIT (OUTPATIENT)
Dept: RHEUMATOLOGY | Facility: CLINIC | Age: 49
End: 2024-06-27
Attending: INTERNAL MEDICINE
Payer: MEDICARE

## 2024-06-27 DIAGNOSIS — M13.80 SERONEGATIVE ARTHRITIS: Primary | ICD-10-CM

## 2024-06-27 DIAGNOSIS — Z71.85 VACCINE COUNSELING: ICD-10-CM

## 2024-06-27 NOTE — PATIENT INSTRUCTIONS
"Recommendations from today's MTM visit:                                                      If provider gives okay following 7/1/24 visit, restart methotrexate 15 mg weekly and folic acid 1 mg daily. Report any worsening of rash after restart.  Consider the following vaccines: Shingrix.    Follow-up: 12 weeks for methotrexate effectiveness.    It was great speaking with you today.  I value your experience and would be very thankful for your time in providing feedback in our clinic survey. In the next few days, you may receive an email or text message from [employer] with a link to a survey related to your  clinical pharmacist.\"     To schedule another MTM appointment, please call the clinic directly or you may call the MTM scheduling line at [phone] or toll-free at [phone].     My Clinical Pharmacist's contact information:                                                      Please feel free to contact me with any questions or concerns you have.      Glory Quiroz, PharmD  Medication Therapy Management Pharmacist  [facility]   "

## 2024-06-27 NOTE — Clinical Note
6/27/2024       RE: Clemencia Rosales  [address]  [facility] [address]     Dear Colleague,    Thank you for referring your patient, Clemencia Rosales, to the [facility] [place] at [facility]. Please see a copy of my visit note below.    Medication Therapy Management (MTM) Encounter    ASSESSMENT:                            Medication Adherence/Access: No issues identified.    Seronegative inflammatory arthritis: Patient reporting rash on one leg which comes and goes, not overly bothersome but provider did have her hold methotrexate dose and will assess rash on 7/1. Given nature of rash described today, unlikely to be related to medication reaction and would recommend restarting methotrexate pending okay from provider. Unable to assess effectiveness today as she has only been on the medication for 4 weeks, will reassess at 12 weeks.    Vaccine counseling: Indicated for Shingrix per ACIP recommendations for patients on immunosuppressing medications.    PLAN:                            If provider gives okay following 7/1/24 visit, restart methotrexate 15 mg weekly and folic acid 1 mg daily. Report any worsening of rash after restart.  Consider the following vaccines: Shingrix.    Follow-up: 12 weeks for methotrexate effectiveness.    SUBJECTIVE/OBJECTIVE:                          Clemencia Rosales is a 48 year old female called for a follow up visit from 5/9/24. She was referred to me from Larissa Espinoza MD.    Reason for visit: Methotrexate 4 week follow up.    Allergies/ADRs: Reviewed in chart  Past Medical History: Reviewed in chart  Tobacco: She reports that she has never smoked. She has never used smokeless tobacco.  Alcohol: does not drink    Medication Adherence/Access: No issues identified.    Seronegative inflammatory arthritis:   Methotrexate 15 mg every 7 days  Folic acid 1 mg daily    Patient reports rash on leg which  "appears and then resolves, she does not necessarily think this is from the methotrexate but was told by provider to hold next dose. The rash is not itchy or painful. She does have a dermatologist. Will be seeing rheumatologist on 7/1. Otherwise has had no significant side effects from methotrexate. She is still taking one ibuprofen and one aspirin daily, does not think she will be able to stop these as she uses them for other indications as well. Still with significant swelling in both ankles, reported \"they look like tennis balls.\" Also with widespread joint pain and stiffness.    Vaccine counseling: Open to receiving recommended vaccines.    Immunization History   Administered Date(s) Administered    COVID-19 12+ (2023-24) (Pfizer) 11/06/2023    COVID-19 Bivalent 18+ (Moderna) 11/11/2022    COVID-19 MONOVALENT 12+ (Pfizer) 05/04/2021, 05/25/2021    COVID-19 Monovalent 12+ (Pfizer 2022) 02/25/2022    Influenza (H1N1) 12/23/2009    Influenza (IIV3) PF 12/02/2005, 11/07/2007, 11/14/2008, 09/09/2009, 10/05/2010, 09/15/2011, 09/17/2012, 10/05/2014    Influenza Vaccine >6 months,quad, PF 10/18/2013, 09/20/2016, 10/10/2017, 11/02/2018, 10/05/2021, 11/28/2022, 11/17/2023    Influenza Vaccine, 6+MO IM (QUADRIVALENT W/PRESERVATIVES) 10/30/2015, 10/03/2019, 11/05/2020    Influenza, seasonal, injectable, PF 10/20/2015    Pneumococcal 20 valent Conjugate (Prevnar 20) 01/23/2023    Pneumococcal 23 valent 03/19/2014    TDAP (Adacel,Boostrix) 04/21/2023    TDAP Vaccine (Adacel) 01/04/2012      Today's Vitals: none taken today  ----------------    I spent 20 minutes with this patient today. All changes were made via collaborative practice agreement with Larissa Espinoza MD. A copy of the visit note was provided to the patient's provider(s).    A summary of these recommendations was sent via [employer].    Glory Quiroz, PharmD  Medication Therapy Management Pharmacist  [facility]     Telemedicine Visit " Details  Type of service:  Telephone visit  Start Time: 1430  End Time: 1450     Medication Therapy Recommendations  No medication therapy recommendations to display       Again, thank you for allowing me to participate in the care of your patient.      Sincerely,    Glory Quiroz, [facility]

## 2024-06-27 NOTE — Clinical Note
Called Clemencia for methotrexate tolerability, sounds like she is seeing you 7/1 due to rash on leg - based on what she described to me, I am thinking rash probably not due to methotrexate and okay to restart pending your assessment. Otherwise tolerating the methotrexate well, no benefit yes as to be expected. Can you have her update labs while she is in clinic? Thanks!

## 2024-06-27 NOTE — PROGRESS NOTES
Medication Therapy Management (MTM) Encounter    ASSESSMENT:                            Medication Adherence/Access: No issues identified.    Seronegative inflammatory arthritis: Patient reporting rash on one leg which comes and goes, not overly bothersome but provider did have her hold methotrexate dose and will assess rash on 7/1. Given nature of rash described today, unlikely to be related to medication reaction and would recommend restarting methotrexate pending okay from provider. Unable to assess effectiveness today as she has only been on the medication for 4 weeks, will reassess at 12 weeks.    Vaccine counseling: Indicated for Shingrix per ACIP recommendations for patients on immunosuppressing medications.    PLAN:                            If provider gives okay following 7/1/24 visit, restart methotrexate 15 mg weekly and folic acid 1 mg daily. Report any worsening of rash after restart.  Consider the following vaccines: Shingrix.    Follow-up: 12 weeks for methotrexate effectiveness.    SUBJECTIVE/OBJECTIVE:                          Clemencia Rosales is a 48 year old female called for a follow up visit from 5/9/24. She was referred to me from Larissa Espinoza MD.    Reason for visit: Methotrexate 4 week follow up.    Allergies/ADRs: Reviewed in chart  Past Medical History: Reviewed in chart  Tobacco: She reports that she has never smoked. She has never used smokeless tobacco.  Alcohol: does not drink    Medication Adherence/Access: No issues identified.    Seronegative inflammatory arthritis:   Methotrexate 15 mg every 7 days  Folic acid 1 mg daily    Patient reports rash on leg which appears and then resolves, she does not necessarily think this is from the methotrexate but was told by provider to hold next dose. The rash is not itchy or painful. She does have a dermatologist. Will be seeing rheumatologist on 7/1. Otherwise has had no significant side effects from methotrexate. She is still taking one  "ibuprofen and one aspirin daily, does not think she will be able to stop these as she uses them for other indications as well. Still with significant swelling in both ankles, reported \"they look like tennis balls.\" Also with widespread joint pain and stiffness.    Vaccine counseling: Open to receiving recommended vaccines.    Immunization History   Administered Date(s) Administered    COVID-19 12+ (2023-24) (Pfizer) 11/06/2023    COVID-19 Bivalent 18+ (Moderna) 11/11/2022    COVID-19 MONOVALENT 12+ (Pfizer) 05/04/2021, 05/25/2021    COVID-19 Monovalent 12+ (Pfizer 2022) 02/25/2022    Influenza (H1N1) 12/23/2009    Influenza (IIV3) PF 12/02/2005, 11/07/2007, 11/14/2008, 09/09/2009, 10/05/2010, 09/15/2011, 09/17/2012, 10/05/2014    Influenza Vaccine >6 months,quad, PF 10/18/2013, 09/20/2016, 10/10/2017, 11/02/2018, 10/05/2021, 11/28/2022, 11/17/2023    Influenza Vaccine, 6+MO IM (QUADRIVALENT W/PRESERVATIVES) 10/30/2015, 10/03/2019, 11/05/2020    Influenza, seasonal, injectable, PF 10/20/2015    Pneumococcal 20 valent Conjugate (Prevnar 20) 01/23/2023    Pneumococcal 23 valent 03/19/2014    TDAP (Adacel,Boostrix) 04/21/2023    TDAP Vaccine (Adacel) 01/04/2012      Today's Vitals: none taken today  ----------------    I spent 20 minutes with this patient today. All changes were made via collaborative practice agreement with Larissa Espinoza MD. A copy of the visit note was provided to the patient's provider(s).    A summary of these recommendations was sent via [employer].    Glory Quiroz, PharmD  Medication Therapy Management Pharmacist  [facility]     Telemedicine Visit Details  Type of service:  Telephone visit  Start Time: 1430  End Time: 1450     Medication Therapy Recommendations  No medication therapy recommendations to display   "

## 2024-07-01 ENCOUNTER — LAB (OUTPATIENT)
Dept: LAB | Facility: CLINIC | Age: 49
End: 2024-07-01
Payer: MEDICARE

## 2024-07-01 ENCOUNTER — TELEPHONE (OUTPATIENT)
Dept: RHEUMATOLOGY | Facility: CLINIC | Age: 49
End: 2024-07-01

## 2024-07-01 ENCOUNTER — OFFICE VISIT (OUTPATIENT)
Dept: RHEUMATOLOGY | Facility: CLINIC | Age: 49
End: 2024-07-01
Attending: INTERNAL MEDICINE
Payer: MEDICARE

## 2024-07-01 VITALS
BODY MASS INDEX: 22.8 KG/M2 | OXYGEN SATURATION: 98 % | DIASTOLIC BLOOD PRESSURE: 74 MMHG | WEIGHT: 127.9 LBS | HEART RATE: 108 BPM | SYSTOLIC BLOOD PRESSURE: 147 MMHG

## 2024-07-01 DIAGNOSIS — Z79.899 LONG-TERM USE OF HIGH-RISK MEDICATION: ICD-10-CM

## 2024-07-01 DIAGNOSIS — M19.09 PRIMARY OSTEOARTHRITIS, OTHER SPECIFIED SITE: ICD-10-CM

## 2024-07-01 DIAGNOSIS — M25.572 CHRONIC PAIN OF BOTH ANKLES: ICD-10-CM

## 2024-07-01 DIAGNOSIS — G89.29 CHRONIC PAIN OF BOTH ANKLES: ICD-10-CM

## 2024-07-01 DIAGNOSIS — M19.90 INFLAMMATORY ARTHRITIS: Primary | ICD-10-CM

## 2024-07-01 DIAGNOSIS — E89.0 HYPOTHYROIDISM, POSTRADIOIODINE THERAPY: Primary | ICD-10-CM

## 2024-07-01 DIAGNOSIS — M19.90 INFLAMMATORY ARTHRITIS: ICD-10-CM

## 2024-07-01 DIAGNOSIS — I73.00 RAYNAUD'S DISEASE WITHOUT GANGRENE: ICD-10-CM

## 2024-07-01 DIAGNOSIS — J44.9 OBSTRUCTIVE LUNG DISEASE (H): ICD-10-CM

## 2024-07-01 DIAGNOSIS — R79.82 CRP ELEVATED: ICD-10-CM

## 2024-07-01 DIAGNOSIS — M25.571 CHRONIC PAIN OF BOTH ANKLES: ICD-10-CM

## 2024-07-01 LAB
ALBUMIN SERPL BCG-MCNC: 4.4 G/DL (ref 3.5–5.2)
ALP SERPL-CCNC: 69 U/L (ref 40–150)
ALT SERPL W P-5'-P-CCNC: 18 U/L (ref 0–50)
ANION GAP SERPL CALCULATED.3IONS-SCNC: 9 MMOL/L (ref 7–15)
AST SERPL W P-5'-P-CCNC: 30 U/L (ref 0–45)
BASOPHILS # BLD AUTO: 0.1 10E3/UL (ref 0–0.2)
BASOPHILS NFR BLD AUTO: 1 %
BILIRUB SERPL-MCNC: 0.3 MG/DL
BUN SERPL-MCNC: 14.2 MG/DL (ref 6–20)
CALCIUM SERPL-MCNC: 8.2 MG/DL (ref 8.6–10)
CALCIUM SERPL-MCNC: 8.2 MG/DL (ref 8.6–10)
CHLORIDE SERPL-SCNC: 101 MMOL/L (ref 98–107)
CREAT SERPL-MCNC: 0.95 MG/DL (ref 0.51–0.95)
CRP SERPL-MCNC: 4.99 MG/L
DEPRECATED HCO3 PLAS-SCNC: 27 MMOL/L (ref 22–29)
EGFRCR SERPLBLD CKD-EPI 2021: 74 ML/MIN/1.73M2
EOSINOPHIL # BLD AUTO: 0.4 10E3/UL (ref 0–0.7)
EOSINOPHIL NFR BLD AUTO: 6 %
ERYTHROCYTE [DISTWIDTH] IN BLOOD BY AUTOMATED COUNT: 13.2 % (ref 10–15)
GLUCOSE SERPL-MCNC: 102 MG/DL (ref 70–99)
HCT VFR BLD AUTO: 41.1 % (ref 35–47)
HGB BLD-MCNC: 13.4 G/DL (ref 11.7–15.7)
IMM GRANULOCYTES # BLD: 0 10E3/UL
IMM GRANULOCYTES NFR BLD: 0 %
LYMPHOCYTES # BLD AUTO: 1.5 10E3/UL (ref 0.8–5.3)
LYMPHOCYTES NFR BLD AUTO: 20 %
MAGNESIUM SERPL-MCNC: 1.9 MG/DL (ref 1.7–2.3)
MCH RBC QN AUTO: 29.7 PG (ref 26.5–33)
MCHC RBC AUTO-ENTMCNC: 32.6 G/DL (ref 31.5–36.5)
MCV RBC AUTO: 91 FL (ref 78–100)
MONOCYTES # BLD AUTO: 0.6 10E3/UL (ref 0–1.3)
MONOCYTES NFR BLD AUTO: 8 %
NEUTROPHILS # BLD AUTO: 4.9 10E3/UL (ref 1.6–8.3)
NEUTROPHILS NFR BLD AUTO: 66 %
PHOSPHATE SERPL-MCNC: 3.2 MG/DL (ref 2.5–4.5)
PLATELET # BLD AUTO: 186 10E3/UL (ref 150–450)
POTASSIUM SERPL-SCNC: 4.5 MMOL/L (ref 3.4–5.3)
PROT SERPL-MCNC: 7.1 G/DL (ref 6.4–8.3)
PTH-INTACT SERPL-MCNC: 16 PG/ML (ref 15–65)
RBC # BLD AUTO: 4.51 10E6/UL (ref 3.8–5.2)
SODIUM SERPL-SCNC: 137 MMOL/L (ref 135–145)
T4 FREE SERPL-MCNC: 1.29 NG/DL (ref 0.9–1.7)
TSH SERPL DL<=0.005 MIU/L-ACNC: 0.8 UIU/ML (ref 0.3–4.2)
WBC # BLD AUTO: 7.5 10E3/UL (ref 4–11)

## 2024-07-01 PROCEDURE — 84100 ASSAY OF PHOSPHORUS: CPT

## 2024-07-01 PROCEDURE — 99000 SPECIMEN HANDLING OFFICE-LAB: CPT

## 2024-07-01 PROCEDURE — 83970 ASSAY OF PARATHORMONE: CPT

## 2024-07-01 PROCEDURE — 80053 COMPREHEN METABOLIC PANEL: CPT

## 2024-07-01 PROCEDURE — 86140 C-REACTIVE PROTEIN: CPT

## 2024-07-01 PROCEDURE — G2211 COMPLEX E/M VISIT ADD ON: HCPCS | Performed by: INTERNAL MEDICINE

## 2024-07-01 PROCEDURE — 36415 COLL VENOUS BLD VENIPUNCTURE: CPT

## 2024-07-01 PROCEDURE — G0463 HOSPITAL OUTPT CLINIC VISIT: HCPCS | Performed by: INTERNAL MEDICINE

## 2024-07-01 PROCEDURE — 84439 ASSAY OF FREE THYROXINE: CPT

## 2024-07-01 PROCEDURE — 85025 COMPLETE CBC W/AUTO DIFF WBC: CPT

## 2024-07-01 PROCEDURE — 82955 ASSAY OF G6PD ENZYME: CPT | Mod: 90

## 2024-07-01 PROCEDURE — 84443 ASSAY THYROID STIM HORMONE: CPT

## 2024-07-01 PROCEDURE — 99214 OFFICE O/P EST MOD 30 MIN: CPT | Performed by: INTERNAL MEDICINE

## 2024-07-01 PROCEDURE — 83735 ASSAY OF MAGNESIUM: CPT

## 2024-07-01 ASSESSMENT — PAIN SCALES - GENERAL: PAINLEVEL: NO PAIN (0)

## 2024-07-01 NOTE — NURSING NOTE
Chief Complaint   Patient presents with    RECHECK     3 mo f/u       BP (!) 147/74 (BP Location: Right arm, Patient Position: Sitting, Cuff Size: Adult Regular)   Pulse 108   Wt 58 kg (127 lb 14.4 oz)   SpO2 98%   BMI 22.80 kg/m    Dianna HAN

## 2024-07-01 NOTE — PROGRESS NOTES
2/26/2024    Chief Complaint/Reason for Visit: polyarthralgia    HPI:    Autumn JESSIKA Rosales  is a 48 year old  female with past medical history listed below.  She started taking methotrexate a month ago and is now on 15  mg weekly along with daily folic acid 1 mg. She mentioned of having bilateral ankle swelling. Morning stiffness lasts x 1 hour.     REVIEW OF SYSTEMS    Cardiovascular - neg for chest pain  Respiratory - pos for sob, she checked her o2 sats - 89-91.  Gastrointestinal - neg for bloody diarrhea  Skin -she recently had a rash on right upper thigh that self resolved,  she was diagnosed with geena's diease, follows up with Derm at OSH  Neuro - neg for stroke or seizure  Hematologic - neg for blood clots  OBS/GYN- never been pregnant  Maternal aunt - RA       Past Medical History:   Diagnosis Date    Arrhythmia     Arthritis     COPD (chronic obstructive pulmonary disease) (H)     Not COPD, rare lung disease Obliterans Bronchiolitis    Fibromyalgia 2008    [facility]    Head pain 1/16/2015    MEDICAL HISTORY OF -     20 ativan per month (menstral period/BOOP)    obliterative bronchiolitis     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 Sanches at [facility]; lung bx 2007 bronchiolitis and bronchiectasis,; had severe viral pneumonia age 15    Other congenital anomaly of lung     restrictive/obstructive disease,  30% lung capapcity    Pneumonia     viral pneumonia-age 14yo, was intubated in the ICU    Sinus tachycardia     Temporary low platelet count (H24) 6/12/2014    Thyrotoxicosis without mention of goiter or other cause, without mention of thyrotoxic crisis or storm 2002    Treated with irradiation-on synthroid     Past Surgical History:   Procedure Laterality Date    BIOPSY  2007    lung biopsy    THORACOSCOPIC BIOPSY RIB      x4    thyroid radiation      secondary to graves     Family History   Problem Relation Age of Onset    Diabetes Father     Other Cancer Maternal Grandfather         lung  "cancer    Diabetes Paternal Aunt     Diabetes Paternal Uncle     Asthma Other     Thyroid Disease Other     Asthma Other     Asthma Paternal Uncle      Social History     Socioeconomic History    Marital status: Single   Tobacco Use    Smoking status: Never    Smokeless tobacco: Never   Substance and Sexual Activity    Alcohol use: No    Drug use: No    Sexual activity: Not Currently   Other Topics Concern    Parent/sibling w/ CABG, MI or angioplasty before 65F 55M? No    Caffeine Concern No     Comment: occasional small size coke     Exercise Yes     Comment: rehab    Seat Belt Yes       Allergies   Allergen Reactions    Sulfa Antibiotics Other (See Comments)     Never taken medication however \"was told to put it down just in case\"       Current Outpatient Medications   Medication Sig Dispense Refill    albuterol (PROAIR HFA/PROVENTIL HFA/VENTOLIN HFA) 108 (90 Base) MCG/ACT inhaler INHALE 1 TO 2 PUFFS INTO THE LUNGS EVERY 4 HOURS AS NEEDED FOR SHORTNESS OF BREATH OR DIFFICULT BREATHING OR WHEEZING 18 g 0    aspirin 325 MG tablet Take  by mouth daily. Takes 2 tablets as needed      BENADRYL OR AS NEEDED      benzonatate (TESSALON) 100 MG capsule Take 1-2 capsules (100-200 mg) by mouth 3 times daily as needed for cough 30 capsule 0    Cholecalciferol (VITAMIN D PO)       cyclobenzaprine (FLEXERIL) 10 MG tablet TAKE 1/2 TO 1 TABLET(5 TO 10 MG) BY MOUTH THREE TIMES DAILY AS NEEDED FOR MUSCLE SPASMS 90 tablet 0    fluocinonide (LIDEX) 0.05 % external solution       fluticasone (FLONASE) 50 MCG/ACT nasal spray Spray 1 spray into both nostrils daily 16 mL 11    folic acid (FOLVITE) 1 MG tablet Take 1 tablet (1 mg) by mouth daily 90 tablet 3    gabapentin (NEURONTIN) 300 MG capsule Take 1 capsule (300 mg) by mouth 3 times daily 90 capsule 3    hydrocortisone 2.5 % cream   2    hydrocortisone valerate (WEST-GISELE) 0.2 % ointment Apply sparingly to affected area three times daily as needed. 45 g 0    Ibuprofen-diphenhydrAMINE " Cit (ADVIL PM PO) Take 1 tablet by mouth daily Takes at night.      ketoconazole (NIZORAL) 2 % external shampoo       levothyroxine (SYNTHROID) 112 MCG tablet Take 100 mcg by mouth daily       methotrexate 2.5 MG tablet Take 6 tablets (15 mg) by mouth every 7 days Labs every 8 - 12 weeks for refills. 24 tablet 2    metoprolol succinate ER (TOPROL XL) 50 MG 24 hr tablet TAKE 2 TABLETS(100 MG) BY MOUTH DAILY 180 tablet 0     No current facility-administered medications for this visit.       PHYSICAL EXAM    There were no vitals taken for this visit.      General: Alert, No apparent distress   Psych: Affect euthymic  Eyes: Sclera non injected  Neuro: Normal gait and Able to arise from seated position unassisted  Musculoskeletal: Hands:  Normal. No synovitis.   Wrists:  Normal.  Elbows:  Normal.  Shoulders:  Normal.  Feet:  Normal.  Ankles:  minimal swelling + right ankle   Knees:  Normal.    LABS   Reviewed as below.     2024  CCP neg   ACE level normal  FERMIN neg    Nov 2023  T4 normal   T3 low, TSH elevated at 4.52    April 2023  CBC - Normal wbc, Hb and plt   CMP - Normal creatinine and LFT     2022  ARABELLA neg   RF neg     2021  HCV neg   HIV neg   Vit D normal   ESR, CRP normal     XR HAND RIGHT G/E 3 VIEWS 4/21/2023 2:07 PM     HISTORY: Nodule of finger of right hand, finger pain.     COMPARISON: None.                                                                      IMPRESSION: No fracture. Normal alignment. No erosive changes.    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                                                                       IMPRESSION: Since February 10, 2021, heart size is normal. No pleural  effusion, pneumothorax, or abnormal area of consolidation. Scoliotic  curvature of the thoracolumbar spine, apex left.     Narrative & Impression   RIGHT FOOT THREE OR MORE VIEWS  10/5/2021 3:44 PM      HISTORY: Foot swelling.     COMPARISON: None.                                                                       IMPRESSION: No evidence of acute fracture. Normal joint spacing and  alignment.     RIGHT ANKLE THREE OR MORE VIEWS  10/5/2021 3:43 PM      HISTORY: Right ankle and foot pain and swelling.     COMPARISON: None.                                                                      IMPRESSION: Normal joint spacing and alignment. No evidence of acute  fracture.  ASSESSMENT      1. Inflammatory arthritis    2. CRP elevated    3. Obstructive lung disease (H)    4. Raynaud's disease without gangrene    5. Long-term use of high-risk medication        Inflammatory arthritis (primary encounter diagnosis)  Comment: RF,CCP neg.Pain and swelling of both ankles as well as the feet since 2021.  She states lately it has been the right ankle that is being more swollen but both ankles do swell up at the same time symptoms as well. X ray right hand and ankle normal. She does not have clinical features of lupus, had a neg ARABELLA in the past. Differentials : seroneg RA/SpA/reactive arthritis? MRI left ankle - minimal fluid in the left ankle joint. MRI right ankle - Low to moderate grade partial tear deep portion of deltoid ligament. Mild achilles peritendinitis.FSERO test came back pos for 14-3-3 ETA. Started on methotrexate in May 2024. Currently on methotrexate 15 mg weekly along with folic acid 1 mg daily.   Plan:   Continue methotrexate 15 mg weekly.   Will communicate with pulmonology if ok to continue methotrexate,   Continue folic acid 1 mg daily.   Orders Placed This Encounter   Procedures    Comprehensive metabolic panel    CRP inflammation    CBC with platelets differential       (J44.9) Obstructive lung disease (H)  Comment: noted  Plan: ref to Pulm     (I73.00) Raynaud's disease without gangrene  Comment: no raynaud's on exam   Plan: work up as above     Long term use of high-risk medication  Comment : methotrexate  Plan : The adverse reactions of MTX was discussed  which includes cytopenia, immunosuppression, infection, oversedation, pneumonitis, dizziness, nausea, stomach upset, risk of falls, seizures, and derangements in LFTs. Recommended against use of alcohol while on MTX. pt counseled on the adverse effects of Methotrexate including teratogenicity and need for reliable contraception, Alopecia, infection, liver dysfunction and myelosuppression, including the importance of taking daily Folic acid. Patient verbalized understanding and agrees to proceed.    RTC - 12 weeks.     I spent 34 minutes on the date of the encounter doing chart review, history and exam, documentation and orders per the note.      JOSE ARZOLA MD    Division of Rheumatic & Autoimmune Diseases  [facility]

## 2024-07-01 NOTE — TELEPHONE ENCOUNTER
Dear :     I saw our mutual patient in clinic today. She is now on methotrexate for inflammatory arthritis. Just wanted to check with you if you are ok with her staying on it? Kindly let me know your thoughts. Thank you.

## 2024-07-01 NOTE — LETTER
7/1/2024       RE: Clemencia Rosales  [address]  [facility] [address]     Dear Colleague,    Thank you for referring your patient, Clemencia Rosales, to the [facility] RHEUMATOLOGY at [facility]. Please see a copy of my visit note below.                       2/26/2024    Chief Complaint/Reason for Visit: polyarthralgia    HPI:    Clemencia Rosales  is a 48 year old  female with past medical history listed below.  She started taking methotrexate a month ago and is now on 15  mg weekly along with daily folic acid 1 mg. She mentioned of having bilateral ankle swelling. Morning stiffness lasts x 1 hour.     REVIEW OF SYSTEMS    Cardiovascular - neg for chest pain  Respiratory - pos for sob, she checked her o2 sats - 89-91.  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 Sanches at [place]; lung bx 2007 bronchiolitis and bronchiectasis,; had severe viral pneumonia age 15    Other congenital anomaly of lung     restrictive/obstructive disease,  30% lung capapcity    Pneumonia     viral pneumonia-age 14yo, was intubated in the ICU    Sinus tachycardia     Temporary low platelet count (H24) 6/12/2014    Thyrotoxicosis without mention of goiter or other cause, without mention of thyrotoxic crisis or storm 2002    Treated with  "irradiation-on synthroid     Past Surgical History:   Procedure Laterality Date    BIOPSY  2007    lung biopsy    THORACOSCOPIC BIOPSY RIB      x4    thyroid radiation      secondary to graves     Family History   Problem Relation Age of Onset    Diabetes Father     Other Cancer Maternal Grandfather         lung cancer    Diabetes Paternal Aunt     Diabetes Paternal Uncle     Asthma Other     Thyroid Disease Other     Asthma Other     Asthma Paternal Uncle      Social History     Socioeconomic History    Marital status: Single   Tobacco Use    Smoking status: Never    Smokeless tobacco: Never   Substance and Sexual Activity    Alcohol use: No    Drug use: No    Sexual activity: Not Currently   Other Topics Concern    Parent/sibling w/ CABG, MI or angioplasty before 65F 55M? No    Caffeine Concern No     Comment: occasional small size coke     Exercise Yes     Comment: rehab    Seat Belt Yes       Allergies   Allergen Reactions    Sulfa Antibiotics Other (See Comments)     Never taken medication however \"was told to put it down just in case\"       Current Outpatient Medications   Medication Sig Dispense Refill    albuterol (PROAIR HFA/PROVENTIL HFA/VENTOLIN HFA) 108 (90 Base) MCG/ACT inhaler INHALE 1 TO 2 PUFFS INTO THE LUNGS EVERY 4 HOURS AS NEEDED FOR SHORTNESS OF BREATH OR DIFFICULT BREATHING OR WHEEZING 18 g 0    aspirin 325 MG tablet Take  by mouth daily. Takes 2 tablets as needed      BENADRYL OR AS NEEDED      benzonatate (TESSALON) 100 MG capsule Take 1-2 capsules (100-200 mg) by mouth 3 times daily as needed for cough 30 capsule 0    Cholecalciferol (VITAMIN D PO)       cyclobenzaprine (FLEXERIL) 10 MG tablet TAKE 1/2 TO 1 TABLET(5 TO 10 MG) BY MOUTH THREE TIMES DAILY AS NEEDED FOR MUSCLE SPASMS 90 tablet 0    fluocinonide (LIDEX) 0.05 % external solution       fluticasone (FLONASE) 50 MCG/ACT nasal spray Spray 1 spray into both nostrils daily 16 mL 11    folic acid (FOLVITE) 1 MG tablet Take 1 tablet (1 mg) " by mouth daily 90 tablet 3    gabapentin (NEURONTIN) 300 MG capsule Take 1 capsule (300 mg) by mouth 3 times daily 90 capsule 3    hydrocortisone 2.5 % cream   2    hydrocortisone valerate (WEST-GISELE) 0.2 % ointment Apply sparingly to affected area three times daily as needed. 45 g 0    Ibuprofen-diphenhydrAMINE Cit (ADVIL PM PO) Take 1 tablet by mouth daily Takes at night.      ketoconazole (NIZORAL) 2 % external shampoo       levothyroxine (SYNTHROID) 112 MCG tablet Take 100 mcg by mouth daily       methotrexate 2.5 MG tablet Take 6 tablets (15 mg) by mouth every 7 days Labs every 8 - 12 weeks for refills. 24 tablet 2    metoprolol succinate ER (TOPROL XL) 50 MG 24 hr tablet TAKE 2 TABLETS(100 MG) BY MOUTH DAILY 180 tablet 0     No current facility-administered medications for this visit.       PHYSICAL EXAM    There were no vitals taken for this visit.      General: Alert, No apparent distress   Psych: Affect euthymic  Eyes: Sclera non injected  Neuro: Normal gait and Able to arise from seated position unassisted  Musculoskeletal: Hands:  Normal. No synovitis.   Wrists:  Normal.  Elbows:  Normal.  Shoulders:  Normal.  Feet:  Normal.  Ankles:  minimal swelling + right ankle   Knees:  Normal.    LABS   Reviewed as below.     2024  CCP neg   ACE level normal  FERMIN neg    Nov 2023  T4 normal   T3 low, TSH elevated at 4.52    April 2023  CBC - Normal wbc, Hb and plt   CMP - Normal creatinine and LFT     2022  ARABELLA neg   RF neg     2021  HCV neg   HIV neg   Vit D normal   ESR, CRP normal     XR HAND RIGHT G/E 3 VIEWS 4/21/2023 2:07 PM     HISTORY: Nodule of finger of right hand, finger pain.     COMPARISON: None.                                                                      IMPRESSION: No fracture. Normal alignment. No erosive changes.    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                                                                        IMPRESSION: Since February 10, 2021, heart size is normal. No pleural  effusion, pneumothorax, or abnormal area of consolidation. Scoliotic  curvature of the thoracolumbar spine, apex left.     Narrative & Impression   RIGHT FOOT THREE OR MORE VIEWS  10/5/2021 3:44 PM      HISTORY: Foot swelling.     COMPARISON: None.                                                                      IMPRESSION: No evidence of acute fracture. Normal joint spacing and  alignment.     RIGHT ANKLE THREE OR MORE VIEWS  10/5/2021 3:43 PM      HISTORY: Right ankle and foot pain and swelling.     COMPARISON: None.                                                                      IMPRESSION: Normal joint spacing and alignment. No evidence of acute  fracture.  ASSESSMENT      1. Inflammatory arthritis    2. CRP elevated    3. Obstructive lung disease (H)    4. Raynaud's disease without gangrene    5. Long-term use of high-risk medication        Inflammatory arthritis (primary encounter diagnosis)  Comment: RF,CCP neg.Pain and swelling of both ankles as well as the feet since 2021.  She states lately it has been the right ankle that is being more swollen but both ankles do swell up at the same time symptoms as well. X ray right hand and ankle normal. She does not have clinical features of lupus, had a neg ARABELLA in the past. Differentials : seroneg RA/SpA/reactive arthritis? MRI left ankle - minimal fluid in the left ankle joint. MRI right ankle - Low to moderate grade partial tear deep portion of deltoid ligament. Mild achilles peritendinitis.FSERO test came back pos for 14-3-3 ETA. Started on methotrexate in May 2024. Currently on methotrexate 15 mg weekly along with folic acid 1 mg daily.   Plan:   Continue methotrexate 15 mg weekly.   Will communicate with pulmonology if ok to continue methotrexate,   Continue folic acid 1 mg daily.   Orders Placed This Encounter   Procedures    Comprehensive metabolic panel    CRP inflammation    CBC  with platelets differential       (J44.9) Obstructive lung disease (H)  Comment: noted  Plan: ref to Pulm     (I73.00) Raynaud's disease without gangrene  Comment: no raynaud's on exam   Plan: work up as above     Long term use of high-risk medication  Comment : methotrexate  Plan : The adverse reactions of MTX was discussed which includes cytopenia, immunosuppression, infection, oversedation, pneumonitis, dizziness, nausea, stomach upset, risk of falls, seizures, and derangements in LFTs. Recommended against use of alcohol while on MTX. pt counseled on the adverse effects of Methotrexate including teratogenicity and need for reliable contraception, Alopecia, infection, liver dysfunction and myelosuppression, including the importance of taking daily Folic acid. Patient verbalized understanding and agrees to proceed.    RTC - 12 weeks.     I spent 34 minutes on the date of the encounter doing chart review, history and exam, documentation and orders per the note.      JOSE ARZOLA MD    Division of Rheumatic & Autoimmune Diseases  [facility]

## 2024-07-02 LAB — G6PD RBC-CCNT: 13.5 U/G HB

## 2024-08-01 ENCOUNTER — MYC MEDICAL ADVICE (OUTPATIENT)
Dept: FAMILY MEDICINE | Facility: CLINIC | Age: 49
End: 2024-08-01
Payer: MEDICARE

## 2024-08-01 DIAGNOSIS — G44.219 EPISODIC TENSION-TYPE HEADACHE, NOT INTRACTABLE: ICD-10-CM

## 2024-08-02 RX ORDER — GABAPENTIN 300 MG/1
300 CAPSULE ORAL 3 TIMES DAILY
Qty: 90 CAPSULE | Refills: 5 | Status: SHIPPED | OUTPATIENT
Start: 2024-08-02

## 2024-08-03 ENCOUNTER — HEALTH MAINTENANCE LETTER (OUTPATIENT)
Age: 49
End: 2024-08-03

## 2024-08-03 DIAGNOSIS — Z86.79 H/O SINUS TACHYCARDIA: ICD-10-CM

## 2024-08-03 DIAGNOSIS — M79.7 FIBROMYALGIA: ICD-10-CM

## 2024-08-05 RX ORDER — METOPROLOL SUCCINATE 50 MG/1
TABLET, EXTENDED RELEASE ORAL
Qty: 180 TABLET | Refills: 0 | Status: SHIPPED | OUTPATIENT
Start: 2024-08-05

## 2024-08-05 RX ORDER — CYCLOBENZAPRINE HCL 10 MG
TABLET ORAL
Qty: 90 TABLET | Refills: 0 | Status: SHIPPED | OUTPATIENT
Start: 2024-08-05

## 2024-08-19 ENCOUNTER — VIRTUAL VISIT (OUTPATIENT)
Dept: RHEUMATOLOGY | Facility: CLINIC | Age: 49
End: 2024-08-19
Attending: INTERNAL MEDICINE
Payer: MEDICARE

## 2024-08-19 DIAGNOSIS — M13.80 SERONEGATIVE ARTHRITIS: ICD-10-CM

## 2024-08-19 DIAGNOSIS — M19.90 INFLAMMATORY ARTHRITIS: ICD-10-CM

## 2024-08-19 DIAGNOSIS — M06.9 RHEUMATOID ARTHRITIS (H): Primary | ICD-10-CM

## 2024-08-19 DIAGNOSIS — Z71.85 VACCINE COUNSELING: ICD-10-CM

## 2024-08-19 NOTE — Clinical Note
Called for 12-week methotrexate follow up. Unfortunately she has not had much benefit, having difficulty walking due to pain in hips and ankles/feet. Labs were stable at last check, would you want to increase the dose (currently taking 15 mg weekly)? Sees you in Nov. Thanks!

## 2024-08-19 NOTE — PROGRESS NOTES
Medication Therapy Management (MTM) Encounter    ASSESSMENT:                            Medication Adherence/Access: No issues identified.    Seronegative inflammatory arthritis: Patient has been on methotrexate consistently for 10-12 weeks (since early June) but unfortunately has not noticed appreciable benefit. Labs done in July were stable at current dose. She has had progression of pain in ankles and hips which is making walking difficult and impacting her ability to perform ADLs. Would consider increasing dose or switching to alternative medication pending provider response.    Vaccine counseling: Indicated for Shingrix per ACIP recommendations for patients on immunosuppressing medications.    PLAN:                            Will discuss increasing methotrexate dose prior to November visit with provider. Until we have heard back, continue 15 mg weekly dose.  Consider the following vaccines: Shingrix.    Follow-up: Pending provider response as above. Next rheumatologist visit is 11/11/24.    Addendum 8/26/2024:  Dr. Espinoza gave verbal permission to increase the dose of methotrexate to 17.5 mg per week.    SUBJECTIVE/OBJECTIVE:                          Clemencia Rosales is a 48 year old female called for a follow up visit from 6/27/24. She was referred to me from Larissa Espinoza MD.    Reason for visit: Methotrexate 12 week follow up.    Allergies/ADRs: Reviewed in chart  Past Medical History: Reviewed in chart  Tobacco: She reports that she has never smoked. She has never used smokeless tobacco.  Alcohol: does not drink    Medication Adherence/Access: No issues identified.    Seronegative inflammatory arthritis:   Methotrexate 15 mg every 7 days  Folic acid 1 mg daily    Patient restarted methotrexate after holding temporarily for a rash. No recurrence or rash or other side effects. She has now been on it consistently since early June. Has not had any improvement in pain or swelling and is concerned because walking has  started to become difficult due to pain in ankles and hips. She does enjoy walking long distances so being able to do this is important to her. She also reports consistent morning stiffness starting at 3-4 am which does improve after several hours but is also exacerbated by activity later in the day. She is open to increasing methotrexate dose if provider thinks this would be beneficial.    Component      Latest Ref Rng 7/1/2024  1:35 PM   WBC      4.0 - 11.0 10e3/uL 7.5    RBC Count      3.80 - 5.20 10e6/uL 4.51    Hemoglobin      11.7 - 15.7 g/dL 13.4    Hematocrit      35.0 - 47.0 % 41.1    MCV      78 - 100 fL 91    MCH      26.5 - 33.0 pg 29.7    MCHC      31.5 - 36.5 g/dL 32.6    RDW      10.0 - 15.0 % 13.2    Platelet Count      150 - 450 10e3/uL 186    % Neutrophils      % 66    % Lymphocytes      % 20    % Monocytes      % 8    % Eosinophils      % 6    % Basophils      % 1    % Immature Granulocytes      % 0    Absolute Neutrophils      1.6 - 8.3 10e3/uL 4.9    Absolute Lymphocytes      0.8 - 5.3 10e3/uL 1.5    Absolute Monocytes      0.0 - 1.3 10e3/uL 0.6    Absolute Eosinophils      0.0 - 0.7 10e3/uL 0.4    Absolute Basophils      0.0 - 0.2 10e3/uL 0.1    Absolute Immature Granulocytes      <=0.4 10e3/uL 0.0    Sodium      135 - 145 mmol/L 137    Potassium      3.4 - 5.3 mmol/L 4.5    Carbon Dioxide (CO2)      22 - 29 mmol/L 27    Anion Gap      7 - 15 mmol/L 9    Urea Nitrogen      6.0 - 20.0 mg/dL 14.2    Creatinine      0.51 - 0.95 mg/dL 0.95    GFR Estimate      >60 mL/min/1.73m2 74    Calcium      8.6 - 10.0 mg/dL 8.2 (L)    Chloride      98 - 107 mmol/L 101    Glucose      70 - 99 mg/dL 102 (H)    Alkaline Phosphatase      40 - 150 U/L 69    AST      0 - 45 U/L 30    ALT      0 - 50 U/L 18    Protein Total      6.4 - 8.3 g/dL 7.1    Albumin      3.5 - 5.2 g/dL 4.4    Bilirubin Total      <=1.2 mg/dL 0.3    CRP Inflammation      <5.00 mg/L 4.99       Vaccine counseling: Open to receiving recommended  vaccines.    Immunization History   Administered Date(s) Administered    COVID-19 12+ (2023-24) (Pfizer) 11/06/2023    COVID-19 Bivalent 18+ (Moderna) 11/11/2022    COVID-19 MONOVALENT 12+ (Pfizer) 05/04/2021, 05/25/2021    COVID-19 Monovalent 12+ (Pfizer 2022) 02/25/2022    Influenza (H1N1) 12/23/2009    Influenza (IIV3) PF 12/02/2005, 11/07/2007, 11/14/2008, 09/09/2009, 10/05/2010, 09/15/2011, 09/17/2012, 10/05/2014    Influenza Vaccine >6 months,quad, PF 10/18/2013, 09/20/2016, 10/10/2017, 11/02/2018, 10/05/2021, 11/28/2022, 11/17/2023    Influenza Vaccine, 6+MO IM (QUADRIVALENT W/PRESERVATIVES) 10/30/2015, 10/03/2019, 11/05/2020    Influenza, seasonal, injectable, PF 10/20/2015    Pneumococcal 20 valent Conjugate (Prevnar 20) 01/23/2023    Pneumococcal 23 valent 03/19/2014    TDAP (Adacel,Boostrix) 04/21/2023    TDAP Vaccine (Adacel) 01/04/2012      Today's Vitals: none taken today  ----------------    I spent 15 minutes with this patient today. All changes were made via collaborative practice agreement with Larissa Espinoza MD. A copy of the visit note was provided to the patient's provider(s).    A summary of these recommendations was sent via [employer].    Glory Quiroz, PharmD  Medication Therapy Management Pharmacist  [facility]     Telemedicine Visit Details  Type of service:  Telephone visit  Start Time: 1355  End Time: 1410     Medication Therapy Recommendations  Inflammatory arthritis    Current Medication: methotrexate 2.5 MG tablet   Rationale: Dose too low - Dosage too low - Effectiveness   Recommendation: Increase Dose - Consider increasing dose given ongoing symptoms   Status: Contact Provider - Awaiting Response   Note: Sees provider in Nov - could also consider switching to alternative medication

## 2024-08-19 NOTE — Clinical Note
8/19/2024       RE: Clemencia Rosales  [address]  [facility] [address]     Dear Colleague,    Thank you for referring your patient, Clemencia Rosales, to the [facility]HEUMATOLOG[facility]INIC [place] at [facility]. Please see a copy of my visit note below.    Restarted methotrexate     Has not noticed much benefit at this point    No improvements in pain or swelling, walking has started to become difficult due to pain in ankles and hips. She is nervous that walking is getting harder as this is something she enjoys    Has consistent morning stiffness starting at 3-4 am and lasting several hours    Not having any side effects and would be open to increasing dose    Medication Therapy Management (MTM) Encounter    ASSESSMENT:                            Medication Adherence/Access: No issues identified.    Seronegative inflammatory arthritis: Patient has been on methotrexate consistently for 10-12 weeks (since early June) but unfortunately has not noticed appreciable benefit. Labs done in July were stable at current dose. She has had progression of pain in ankles and hips which is making walking difficult and impacting her ability to perform ADLs. Would consider increasing dose or switching to alternative medication pending provider response.    Vaccine counseling: Indicated for Shingrix per ACIP recommendations for patients on immunosuppressing medications.    PLAN:                            Will discuss increasing methotrexate dose prior to November visit with provider. Until we have heard back, continue 15 mg weekly dose.  Consider the following vaccines: Shingrix.    Follow-up: Pending provider response as above. Next rheumatologist visit is 11/11/24.    SUBJECTIVE/OBJECTIVE:                          Clemencia Rosales is a 48 year old female called for a follow up visit from 6/27/24. She was referred to me from Larissa Espinoza MD.    Reason for visit:  Methotrexate 12 week follow up.    Allergies/ADRs: Reviewed in chart  Past Medical History: Reviewed in chart  Tobacco: She reports that she has never smoked. She has never used smokeless tobacco.  Alcohol: does not drink    Medication Adherence/Access: No issues identified.    Seronegative inflammatory arthritis:   Methotrexate 15 mg every 7 days  Folic acid 1 mg daily    Patient restarted methotrexate after holding temporarily for a rash. No recurrence or rash or other side effects. She has now been on it consistently since early June. Has not had any improvement in pain or swelling and is concerned because walking has started to become difficult due to pain in ankles and hips. She does enjoy walking long distances so being able to do this is important to her. She also reports consistent morning stiffness starting at 3-4 am which does improve after several hours but is also exacerbated by activity later in the day. She is open to increasing methotrexate dose if provider thinks this would be beneficial.    Component      Latest Ref Rng 7/1/2024  1:35 PM   WBC      4.0 - 11.0 10e3/uL 7.5    RBC Count      3.80 - 5.20 10e6/uL 4.51    Hemoglobin      11.7 - 15.7 g/dL 13.4    Hematocrit      35.0 - 47.0 % 41.1    MCV      78 - 100 fL 91    MCH      26.5 - 33.0 pg 29.7    MCHC      31.5 - 36.5 g/dL 32.6    RDW      10.0 - 15.0 % 13.2    Platelet Count      150 - 450 10e3/uL 186    % Neutrophils      % 66    % Lymphocytes      % 20    % Monocytes      % 8    % Eosinophils      % 6    % Basophils      % 1    % Immature Granulocytes      % 0    Absolute Neutrophils      1.6 - 8.3 10e3/uL 4.9    Absolute Lymphocytes      0.8 - 5.3 10e3/uL 1.5    Absolute Monocytes      0.0 - 1.3 10e3/uL 0.6    Absolute Eosinophils      0.0 - 0.7 10e3/uL 0.4    Absolute Basophils      0.0 - 0.2 10e3/uL 0.1    Absolute Immature Granulocytes      <=0.4 10e3/uL 0.0    Sodium      135 - 145 mmol/L 137    Potassium      3.4 - 5.3 mmol/L 4.5     Carbon Dioxide (CO2)      22 - 29 mmol/L 27    Anion Gap      7 - 15 mmol/L 9    Urea Nitrogen      6.0 - 20.0 mg/dL 14.2    Creatinine      0.51 - 0.95 mg/dL 0.95    GFR Estimate      >60 mL/min/1.73m2 74    Calcium      8.6 - 10.0 mg/dL 8.2 (L)    Chloride      98 - 107 mmol/L 101    Glucose      70 - 99 mg/dL 102 (H)    Alkaline Phosphatase      40 - 150 U/L 69    AST      0 - 45 U/L 30    ALT      0 - 50 U/L 18    Protein Total      6.4 - 8.3 g/dL 7.1    Albumin      3.5 - 5.2 g/dL 4.4    Bilirubin Total      <=1.2 mg/dL 0.3    CRP Inflammation      <5.00 mg/L 4.99       Vaccine counseling: Open to receiving recommended vaccines.    Immunization History   Administered Date(s) Administered     COVID-19 12+ (2023-24) (Pfizer) 11/06/2023     COVID-19 Bivalent 18+ (Moderna) 11/11/2022     COVID-19 MONOVALENT 12+ (Pfizer) 05/04/2021, 05/25/2021     COVID-19 Monovalent 12+ (Pfizer 2022) 02/25/2022     Influenza (H1N1) 12/23/2009     Influenza (IIV3) PF 12/02/2005, 11/07/2007, 11/14/2008, 09/09/2009, 10/05/2010, 09/15/2011, 09/17/2012, 10/05/2014     Influenza Vaccine >6 months,quad, PF 10/18/2013, 09/20/2016, 10/10/2017, 11/02/2018, 10/05/2021, 11/28/2022, 11/17/2023     Influenza Vaccine, 6+MO IM (QUADRIVALENT W/PRESERVATIVES) 10/30/2015, 10/03/2019, 11/05/2020     Influenza, seasonal, injectable, PF 10/20/2015     Pneumococcal 20 valent Conjugate (Prevnar 20) 01/23/2023     Pneumococcal 23 valent 03/19/2014     TDAP (Adacel,Boostrix) 04/21/2023     TDAP Vaccine (Adacel) 01/04/2012      Today's Vitals: none taken today  ----------------    I spent 15 minutes with this patient today. All changes were made via collaborative practice agreement with Larissa Espinoza MD. A copy of the visit note was provided to the patient's provider(s).    A summary of these recommendations was sent via [employer].    Glory Quiroz, PharmD  Medication Therapy Management Pharmacist  [facility]     Telemedicine Visit  Details  Type of service:  Telephone visit  Start Time: 1355  End Time: 1410     Medication Therapy Recommendations  Inflammatory arthritis    Current Medication: methotrexate 2.5 MG tablet   Rationale: Dose too low - Dosage too low - Effectiveness   Recommendation: Increase Dose - Consider increasing dose given ongoing symptoms   Status: Contact Provider - Awaiting Response   Note: Sees provider in Nov - could also consider switching to alternative medication                Again, thank you for allowing me to participate in the care of your patient.      Sincerely,    Glory Quiroz, RP

## 2024-08-20 NOTE — PATIENT INSTRUCTIONS
"Recommendations from today's MTM visit:                                                      Will discuss increasing methotrexate dose prior to November visit with provider. Until we have heard back, continue 15 mg weekly dose.  Consider the following vaccines: Shingrix.    Follow-up: Pending provider response as above. Next rheumatologist visit is 11/11/24.    It was great speaking with you today.  I value your experience and would be very thankful for your time in providing feedback in our clinic survey. In the next few days, you may receive an email or text message from [employer] with a link to a survey related to your  clinical pharmacist.\"     To schedule another MTM appointment, please call the clinic directly or you may call the MTM scheduling line at [phone] or toll-free at [phone].     My Clinical Pharmacist's contact information:                                                      Please feel free to contact me with any questions or concerns you have.      Glory Quiroz, PharmD  Medication Therapy Management Pharmacist  [facility]    "

## 2024-08-26 RX ORDER — METHOTREXATE 2.5 MG/1
17.5 TABLET ORAL
Qty: 28 TABLET | Refills: 2 | Status: SHIPPED | OUTPATIENT
Start: 2024-08-26 | End: 2024-09-26

## 2024-09-20 ENCOUNTER — OFFICE VISIT (OUTPATIENT)
Dept: PULMONOLOGY | Facility: CLINIC | Age: 49
End: 2024-09-20
Attending: INTERNAL MEDICINE
Payer: MEDICARE

## 2024-09-20 ENCOUNTER — ANCILLARY PROCEDURE (OUTPATIENT)
Dept: CT IMAGING | Facility: CLINIC | Age: 49
End: 2024-09-20
Attending: INTERNAL MEDICINE
Payer: MEDICARE

## 2024-09-20 VITALS
DIASTOLIC BLOOD PRESSURE: 86 MMHG | HEART RATE: 101 BPM | HEIGHT: 63 IN | WEIGHT: 128 LBS | SYSTOLIC BLOOD PRESSURE: 129 MMHG | OXYGEN SATURATION: 98 % | BODY MASS INDEX: 22.68 KG/M2

## 2024-09-20 DIAGNOSIS — J84.9 ILD (INTERSTITIAL LUNG DISEASE) (H): ICD-10-CM

## 2024-09-20 DIAGNOSIS — J44.81 OBLITERATIVE BRONCHIOLITIS (H): Primary | ICD-10-CM

## 2024-09-20 DIAGNOSIS — J47.1 BRONCHIECTASIS WITH (ACUTE) EXACERBATION (H): ICD-10-CM

## 2024-09-20 LAB
6 MIN WALK (FT): 1200 FT
6 MIN WALK (M): 366 M

## 2024-09-20 PROCEDURE — 99205 OFFICE O/P NEW HI 60 MIN: CPT | Mod: 25 | Performed by: INTERNAL MEDICINE

## 2024-09-20 PROCEDURE — G1010 CDSM STANSON: HCPCS | Performed by: RADIOLOGY

## 2024-09-20 PROCEDURE — 93005 ELECTROCARDIOGRAM TRACING: CPT

## 2024-09-20 PROCEDURE — 71250 CT THORAX DX C-: CPT | Mod: MG | Performed by: RADIOLOGY

## 2024-09-20 PROCEDURE — 99417 PROLNG OP E/M EACH 15 MIN: CPT | Performed by: INTERNAL MEDICINE

## 2024-09-20 PROCEDURE — 99207 PR NO CHARGE LOS: CPT

## 2024-09-20 PROCEDURE — G0463 HOSPITAL OUTPT CLINIC VISIT: HCPCS | Performed by: INTERNAL MEDICINE

## 2024-09-20 PROCEDURE — 94618 PULMONARY STRESS TESTING: CPT | Performed by: INTERNAL MEDICINE

## 2024-09-20 RX ORDER — MONTELUKAST SODIUM 10 MG/1
10 TABLET ORAL AT BEDTIME
Qty: 90 TABLET | Refills: 3 | Status: SHIPPED | OUTPATIENT
Start: 2024-09-20

## 2024-09-20 RX ORDER — AZITHROMYCIN 250 MG/1
250 TABLET, FILM COATED ORAL DAILY
Qty: 90 TABLET | Refills: 3 | Status: SHIPPED | OUTPATIENT
Start: 2024-09-20

## 2024-09-20 RX ORDER — BUDESONIDE AND FORMOTEROL FUMARATE DIHYDRATE 160; 4.5 UG/1; UG/1
2 AEROSOL RESPIRATORY (INHALATION) 2 TIMES DAILY
Qty: 10.2 G | Refills: 11 | Status: SHIPPED | OUTPATIENT
Start: 2024-09-20 | End: 2024-09-30

## 2024-09-20 ASSESSMENT — PAIN SCALES - GENERAL: PAINLEVEL: NO PAIN (0)

## 2024-09-20 NOTE — PATIENT INSTRUCTIONS
1. Gargle with water after using Symbicort  2. Continue walking as you are doing  3. Avoid animals, incense, anything else that makes you feel short of breath  4. Get the flu vaccine in October  5. Get the new updated COVID-19 vaccine

## 2024-09-20 NOTE — NURSING NOTE
Chief Complaint   Patient presents with    Consult     New Interstitial Lung     Medications reviewed and vital signs taken.   Shamir Biswas, JOSE

## 2024-09-20 NOTE — PROGRESS NOTES
Clemencia Rosales comes into clinic today at the request of Dr Appiah , for 6 minute walk    Tolerated testing well. No adverse reactions. Left lab in no distress.        SCOTT SULTANA

## 2024-09-20 NOTE — PROGRESS NOTES
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  I last saw her 10 years ago in 2014, which was her only visit with me.  She had severe viral pneumonia at the age of 15, at which time she was intubated in the ICU.  At the age of 19 in the , she was started on oxygen for a low oxygen level of 84 to 85%.  She used oxygen for 4 years.  She was diagnosed with Graves' disease and had radioactive iodine treatment.  She had a VATS lung biopsy in 2007 and was diagnosed with obliterative bronchiolitis.  She was seen at our clinic and also at [facility] in the past.  Her last pulmonary visit appears to have been with me 10 years ago.    Recently, she developed arthralgias over the past several years.  She was seen in rheumatology by Dr. Espinoza this year, and she was diagnosed with seronegative rheumatoid arthritis and started on methotrexate.  She recommended a pulmonary referral because of her symptoms and her history of obliterative bronchiolitis.    Today, Clemencia reports significant \"air hunger.\"  She feels short of breath sometimes at rest and also with activity.  For example, she does feel short of breath when she walks up a flight of stairs.  This is a little bit more noticeable, but she finds it hard to know over what period of time her shortness of breath has worsened.  However, she notes that she feels more tired now and has not been able to work since the spring.  She does go for walks about every other day for about 2 hours, but usually not at a fast pace.  Her activity level is limited by shortness of breath.  She uses albuterol to help when she is exposed to animals.  She notices worsening in her cough and dyspnea around animals " because she is allergic.      She denies paroxysmal nocturnal dyspnea, cough, wheezing or fevers.  She endorses sicca symptoms and Raynaud's.              Current Outpatient Medications   Medication Sig Dispense Refill    albuterol (PROAIR HFA/PROVENTIL HFA/VENTOLIN HFA) 108 (90 Base) MCG/ACT inhaler INHALE 1 TO 2 PUFFS INTO THE LUNGS EVERY 4 HOURS AS NEEDED FOR SHORTNESS OF BREATH OR DIFFICULT BREATHING OR WHEEZING 18 g 0    aspirin 325 MG tablet Take  by mouth daily. Takes 2 tablets as needed      azithromycin (ZITHROMAX) 250 MG tablet Take 1 tablet (250 mg) by mouth daily. 90 tablet 3    BENADRYL OR AS NEEDED      benzonatate (TESSALON) 100 MG capsule Take 1-2 capsules (100-200 mg) by mouth 3 times daily as needed for cough 30 capsule 0    budesonide-formoterol (SYMBICORT) 160-4.5 MCG/ACT Inhaler Inhale 2 puffs into the lungs 2 times daily. 10.2 g 11    Cholecalciferol (VITAMIN D PO)       cyclobenzaprine (FLEXERIL) 10 MG tablet TAKE 1/2 TO 1 TABLET(5 TO 10 MG) BY MOUTH THREE TIMES DAILY AS NEEDED FOR MUSCLE SPASMS 90 tablet 0    fluocinonide (LIDEX) 0.05 % external solution       fluticasone (FLONASE) 50 MCG/ACT nasal spray Spray 1 spray into both nostrils daily 16 mL 11    folic acid (FOLVITE) 1 MG tablet Take 1 tablet (1 mg) by mouth daily 90 tablet 3    gabapentin (NEURONTIN) 300 MG capsule Take 1 capsule (300 mg) by mouth 3 times daily 90 capsule 5    hydrocortisone 2.5 % cream   2    hydrocortisone valerate (WEST-GISELE) 0.2 % ointment Apply sparingly to affected area three times daily as needed. 45 g 0    Ibuprofen-diphenhydrAMINE Cit (ADVIL PM PO) Take 1 tablet by mouth daily Takes at night.      ketoconazole (NIZORAL) 2 % external shampoo       levothyroxine (SYNTHROID) 112 MCG tablet Take 100 mcg by mouth daily       methotrexate 2.5 MG tablet Take 7 tablets (17.5 mg) by mouth every 7 days. Labs every 8 - 12 weeks for refills. 28 tablet 2    metoprolol succinate ER (TOPROL XL) 50 MG 24 hr tablet TAKE 2  "TABLETS(100 MG) BY MOUTH DAILY 180 tablet 0    montelukast (SINGULAIR) 10 MG tablet Take 1 tablet (10 mg) by mouth at bedtime. 90 tablet 3     No current facility-administered medications for this visit.     Allergies   Allergen Reactions    Sulfa Antibiotics Other (See Comments)     Never taken medication however \"was told to put it down just in case\"     Past Medical History:   Diagnosis Date    Arrhythmia     Arthritis     COPD (chronic obstructive pulmonary disease) (H)     Not COPD, rare lung disease Obliterans Bronchiolitis    Fibromyalgia 2008    [place]    Head pain 01/16/2015    MEDICAL HISTORY OF -     20 ativan per month (menstral period/BOOP)    obliterative bronchiolitis     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    Pneumonia     viral pneumonia-age 14yo, was intubated in the ICU    RA (rheumatoid arthritis) (H)     seronegative RA dx 2024    Sinus tachycardia     Temporary low platelet count (H24) 06/12/2014    Thyrotoxicosis without mention of goiter or other cause, without mention of thyrotoxic crisis or storm 2002    Treated with irradiation-on synthroid       Past Surgical History:   Procedure Laterality Date    BIOPSY  2007    lung biopsy    THORACOSCOPIC BIOPSY RIB      x4    thyroid radiation      secondary to graves       Social History     Socioeconomic History    Marital status: Single     Spouse name: Not on file    Number of children: Not on file    Years of education: Not on file    Highest education level: Not on file   Occupational History    Not on file   Tobacco Use    Smoking status: Never    Smokeless tobacco: Never   Vaping Use    Vaping status: Never Used   Substance and Sexual Activity    Alcohol use: No    Drug use: No    Sexual activity: Not Currently   Other Topics Concern    Parent/sibling " "w/ CABG, MI or angioplasty before 65F 55M? No     Service Not Asked    Blood Transfusions Not Asked    Caffeine Concern No     Comment: occasional small size coke     Occupational Exposure Not Asked    Hobby Hazards Not Asked    Sleep Concern Not Asked    Stress Concern Not Asked    Weight Concern Not Asked    Special Diet Not Asked    Back Care Not Asked    Exercise Yes     Comment: rehab    Bike Helmet Not Asked    Seat Belt Yes    Self-Exams Not Asked   Social History Narrative    Not on file     Social Determinants of Health     Financial Resource Strain: Not on file   Food Insecurity: Not on file   Transportation Needs: Not on file   Physical Activity: Not on file   Stress: Not on file   Social Connections: Not on file   Interpersonal Safety: Low Risk  (4/18/2024)    Interpersonal Safety     Do you feel physically and emotionally safe where you currently live?: Yes     Within the past 12 months, have you been hit, slapped, kicked or otherwise physically hurt by someone?: No     Within the past 12 months, have you been humiliated or emotionally abused in other ways by your partner or ex-partner?: No   Housing Stability: Not on file       Family History   Problem Relation Age of Onset    Diabetes Father     Other Cancer Maternal Grandfather         lung cancer    Diabetes Paternal Aunt     Diabetes Paternal Uncle     Asthma Other     Thyroid Disease Other     Asthma Other     Asthma Paternal Uncle              Vitals: /86   Pulse 101   Ht 1.595 m (5' 2.8\")   Wt 58.1 kg (128 lb)   SpO2 98%   BMI 22.82 kg/m      Exam:   GENERAL APPEARANCE: Well developed, well nourished, alert, and in no apparent distress.  RESP: good air flow throughout.  No crackles. No rhonchi. No wheezes.  No inspiratory squeaks.  CV: Normal S1, S2, regular rhythm, normal rate. No murmur.  No LE edema.   MS: extremities normal. No clubbing. No cyanosis.  SKIN: no rash on limited exam.  NEURO: Mentation intact, speech " normal.  PSYCH: mentation appears normal and affect normal/bright.      Results:  Recent Results (from the past 168 hour(s))   6 minute walk test    Collection Time: 09/20/24 12:00 AM   Result Value Ref Range    6 min walk (FT) 1,200 1,583 ft    6 Min Walk (M) 366 483 m   General PFT Lab (Please always keep checked)    Collection Time: 09/20/24  9:27 AM   Result Value Ref Range    FVC-Pred 3.12 L    FVC-Pre 1.42 L    FVC-%Pred-Pre 45 %    FEV1-Pre 0.88 L    FEV1-%Pred-Pre 34 %    FEV1FVC-Pred 82 %    FEV1FVC-Pre 62 %    FEFMax-Pred 6.65 L/sec    FEFMax-Pre 3.09 L/sec    FEFMax-%Pred-Pre 46 %    FEF2575-Pred 2.63 L/sec    FEF2575-Pre 0.44 L/sec    XFN7183-%Pred-Pre 16 %    ExpTime-Pre 6.78 sec    FIFMax-Pre 2.05 L/sec    VC-Pred 3.31 L    VC-Pre 1.44 L    VC-%Pred-Pre 43 %    IC-Pred 2.28 L    IC-Pre 1.17 L    IC-%Pred-Pre 51 %    ERV-Pred 1.14 L    ERV-Pre 0.26 L    ERV-%Pred-Pre 22 %    FEV1FEV6-Pred 83 %    FEV1FEV6-Pre 62 %    FRCPleth-Pred 2.63 L    FRCPleth-Pre 2.61 L    FRCPleth-%Pred-Pre 99 %    RVPleth-Pred 1.66 L    RVPleth-Pre 2.35 L    RVPleth-%Pred-Pre 141 %    TLCPleth-Pred 4.77 L    TLCPleth-Pre 3.79 L    TLCPleth-%Pred-Pre 79 %    DLCOunc-Pred 20.29 ml/min/mmHg    DLCOunc-Pre 17.13 ml/min/mmHg    DLCOunc-%Pred-Pre 84 %    VA-Pre 2.56 L    VA-%Pred-Pre 54 %    FEV1SVC-Pred 77 %    FEV1SVC-Pre 62 %   EKG 12-lead complete w/read - Clinics    Collection Time: 09/20/24 12:12 PM   Result Value Ref Range    Systolic Blood Pressure  mmHg    Diastolic Blood Pressure  mmHg    Ventricular Rate 89 BPM    Atrial Rate 89 BPM    DE Interval 120 ms    QRS Duration 76 ms     ms    QTc 442 ms    P Axis 67 degrees    R AXIS 47 degrees    T Axis 37 degrees    Interpretation ECG       Sinus rhythm  Normal ECG  When compared with ECG of 21-Jun-2012 10:28,  MANUAL COMPARISON REQUIRED PREVIOUS ECG IS INCOMPATIBLE         I reviewed pulmonary function test that was performed today.  This shows severe airflow obstruction  with a normal diffusion.  Lung function is worse compared to 2014.    I reviewed 6-minute walk test from today.  Walk distance is reduced on room air without exertional hypoxemia.    I also reviewed HRCT today, which shows bronchiectasis, air trapping, and no pulmonary fibrosis.  It looks similar to last chest CT scan from 2014.  The pattern would be consistent with obliterative bronchiolitis.    I reviewed results with the patient.          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  1.  Obliterative bronchiolitis.  Chest CT is consistent, and she had a VATS lung biopsy confirming the diagnosis in 2007.  Clemencia has severe airflow obstruction on her PFT.  Of note, Clemencia's dyspnea on exertion and shortness of breath have worsened since the spring.  PFT today shows worse lung function compared to last PFT from 10 years ago.  Chest CT today is consistent with obliterative bronchiolitis without any new changes since 2014, specifically no interstitial lung disease, evidence of pneumonia or malignancy.  Obliterative bronchiolitis is a chronic irreversible process, and I discussed with Clemencia and her mother that there is no cure.  OB can be idiopathic, secondary to organ transplant, associated with underlying connective tissue disease or be a sequela of viral infection.  The goal of treatment is to try to control symptoms and prevent worsening lung function.  We will start by targeting airway inflammation with inhaled steroid, azithromycin and montelukast to target different mechanisms of small airways inflammation.  We will use Symbicort 160 mcg 2 puffs twice daily, and I will have my medical assistant teach her technique and also give her a spacer.  I recommended that she gargle with water after use.  We also will use azithromycin 250 mg daily and montelukast 10 mg daily as additional airway  anti-inflammatories.  I will get an EKG today before starting azithromycin.  She should avoid animals, incense or any other exposures that worsen her symptoms.  Treating her rheumatoid arthritis with methotrexate is okay from a pulmonary standpoint.  Her HRCT shows no interstitial lung disease.  We will try this regimen for 6 months.  If she is no better or if her OB worsens over the next 6 months, then I would recommend rituximab.  Lastly, we briefly discussed lung transplant.  She did not want to do this 10 years ago.  However, if she worsens despite maximal medical therapy, it might be an option if her arthritis can be controlled.  She will return in 3 months with full PFT.  2.  Healthcare maintenance.  She should get the flu vaccine and the new updated COVID-19 vaccine.  Addendum: EKG today shows normal sinus rhythm.  I will get a repeat EKG at her next appointment to follow-up after starting azithromycin.    The longitudinal plan of care for the diagnosis(es)/condition(s) as documented were addressed during this visit. Due to the added complexity in care, I will continue to support Clemencia in the subsequent management and with ongoing continuity of care.    I spent 109 minutes reviewing chart, talking with and examining patient, reviewing test results, formulating plan and documentation on the day of the encounter (excluding PFT review).    Addendum:  I reviewed chest Ct with Dr. Webb in ILD conference.  Pattern is consistent with OB.  Plan remains the same.

## 2024-09-20 NOTE — LETTER
"9/20/2024      Clemencia Rosales  [address]  [facility] [address]      Dear Colleague,    Thank you for referring your patient, Clemencia Rosales, to the [facility] [place]. Please see a copy of my visit note below.    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  I last saw her 10 years ago in 2014, which was her only visit with me.  She had severe viral pneumonia at the age of 15, at which time she was intubated in the ICU.  At the age of 19 in the , she was started on oxygen for a low oxygen level of 84 to 85%.  She used oxygen for 4 years.  She was diagnosed with Graves' disease and had radioactive iodine treatment.  She had a VATS lung biopsy in 2007 and was diagnosed with obliterative bronchiolitis.  She was seen at our clinic and also at [facility] in the past.  Her last pulmonary visit appears to have been with me 10 years ago.    Recently, she developed arthralgias over the past several years.  She was seen in rheumatology by Dr. Espinoza this year, and she was diagnosed with seronegative rheumatoid arthritis and started on methotrexate.  She recommended a pulmonary referral because of her symptoms and her history of obliterative bronchiolitis.    Today, Clemencia reports significant \"air hunger.\"  She feels short of breath sometimes at rest and also with activity.  For example, she does feel short of breath when she walks up a flight of stairs.  This is a little bit more noticeable, but she finds it hard to know over what period of time her shortness of breath has worsened.  However, she notes that she feels more tired now and has not been able to work since " the spring.  She does go for walks about every other day for about 2 hours, but usually not at a fast pace.  Her activity level is limited by shortness of breath.  She uses albuterol to help when she is exposed to animals.  She notices worsening in her cough and dyspnea around animals because she is allergic.      She denies paroxysmal nocturnal dyspnea, cough, wheezing or fevers.  She endorses sicca symptoms and Raynaud's.              Current Outpatient Medications   Medication Sig Dispense Refill     albuterol (PROAIR HFA/PROVENTIL HFA/VENTOLIN HFA) 108 (90 Base) MCG/ACT inhaler INHALE 1 TO 2 PUFFS INTO THE LUNGS EVERY 4 HOURS AS NEEDED FOR SHORTNESS OF BREATH OR DIFFICULT BREATHING OR WHEEZING 18 g 0     aspirin 325 MG tablet Take  by mouth daily. Takes 2 tablets as needed       azithromycin (ZITHROMAX) 250 MG tablet Take 1 tablet (250 mg) by mouth daily. 90 tablet 3     BENADRYL OR AS NEEDED       benzonatate (TESSALON) 100 MG capsule Take 1-2 capsules (100-200 mg) by mouth 3 times daily as needed for cough 30 capsule 0     budesonide-formoterol (SYMBICORT) 160-4.5 MCG/ACT Inhaler Inhale 2 puffs into the lungs 2 times daily. 10.2 g 11     Cholecalciferol (VITAMIN D PO)        cyclobenzaprine (FLEXERIL) 10 MG tablet TAKE 1/2 TO 1 TABLET(5 TO 10 MG) BY MOUTH THREE TIMES DAILY AS NEEDED FOR MUSCLE SPASMS 90 tablet 0     fluocinonide (LIDEX) 0.05 % external solution        fluticasone (FLONASE) 50 MCG/ACT nasal spray Spray 1 spray into both nostrils daily 16 mL 11     folic acid (FOLVITE) 1 MG tablet Take 1 tablet (1 mg) by mouth daily 90 tablet 3     gabapentin (NEURONTIN) 300 MG capsule Take 1 capsule (300 mg) by mouth 3 times daily 90 capsule 5     hydrocortisone 2.5 % cream   2     hydrocortisone valerate (WEST-GISELE) 0.2 % ointment Apply sparingly to affected area three times daily as needed. 45 g 0     Ibuprofen-diphenhydrAMINE Cit (ADVIL PM PO) Take 1 tablet by mouth daily Takes at night.       ketoconazole  "(NIZORAL) 2 % external shampoo        levothyroxine (SYNTHROID) 112 MCG tablet Take 100 mcg by mouth daily        methotrexate 2.5 MG tablet Take 7 tablets (17.5 mg) by mouth every 7 days. Labs every 8 - 12 weeks for refills. 28 tablet 2     metoprolol succinate ER (TOPROL XL) 50 MG 24 hr tablet TAKE 2 TABLETS(100 MG) BY MOUTH DAILY 180 tablet 0     montelukast (SINGULAIR) 10 MG tablet Take 1 tablet (10 mg) by mouth at bedtime. 90 tablet 3     No current facility-administered medications for this visit.     Allergies   Allergen Reactions     Sulfa Antibiotics Other (See Comments)     Never taken medication however \"was told to put it down just in case\"     Past Medical History:   Diagnosis Date     Arrhythmia      Arthritis      COPD (chronic obstructive pulmonary disease) (H)     Not COPD, rare lung disease Obliterans Bronchiolitis     Fibromyalgia 2008    [place]     Head pain 01/16/2015     MEDICAL HISTORY OF -     20 ativan per month (menstral period/BOOP)     obliterative bronchiolitis     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     Pneumonia     viral pneumonia-age 14yo, was intubated in the ICU     RA (rheumatoid arthritis) (H)     seronegative RA dx 2024     Sinus tachycardia      Temporary low platelet count (H24) 06/12/2014     Thyrotoxicosis without mention of goiter or other cause, without mention of thyrotoxic crisis or storm 2002    Treated with irradiation-on synthroid       Past Surgical History:   Procedure Laterality Date     BIOPSY  2007    lung biopsy     THORACOSCOPIC BIOPSY RIB      x4     thyroid radiation      secondary to graves       Social History     Socioeconomic History     Marital status: Single     Spouse name: Not on file     Number of children: Not on file     Years of education: Not on file     " "Highest education level: Not on file   Occupational History     Not on file   Tobacco Use     Smoking status: Never     Smokeless tobacco: Never   Vaping Use     Vaping status: Never Used   Substance and Sexual Activity     Alcohol use: No     Drug use: No     Sexual activity: Not Currently   Other Topics Concern     Parent/sibling w/ CABG, MI or angioplasty before 65F 55M? No      Service Not Asked     Blood Transfusions Not Asked     Caffeine Concern No     Comment: occasional small size coke      Occupational Exposure Not Asked     Hobby Hazards Not Asked     Sleep Concern Not Asked     Stress Concern Not Asked     Weight Concern Not Asked     Special Diet Not Asked     Back Care Not Asked     Exercise Yes     Comment: rehab     Bike Helmet Not Asked     Seat Belt Yes     Self-Exams Not Asked   Social History Narrative     Not on file     Social Determinants of Health     Financial Resource Strain: Not on file   Food Insecurity: Not on file   Transportation Needs: Not on file   Physical Activity: Not on file   Stress: Not on file   Social Connections: Not on file   Interpersonal Safety: Low Risk  (4/18/2024)    Interpersonal Safety      Do you feel physically and emotionally safe where you currently live?: Yes      Within the past 12 months, have you been hit, slapped, kicked or otherwise physically hurt by someone?: No      Within the past 12 months, have you been humiliated or emotionally abused in other ways by your partner or ex-partner?: No   Housing Stability: Not on file       Family History   Problem Relation Age of Onset     Diabetes Father      Other Cancer Maternal Grandfather         lung cancer     Diabetes Paternal Aunt      Diabetes Paternal Uncle      Asthma Other      Thyroid Disease Other      Asthma Other      Asthma Paternal Uncle              Vitals: /86   Pulse 101   Ht 1.595 m (5' 2.8\")   Wt 58.1 kg (128 lb)   SpO2 98%   BMI 22.82 kg/m      Exam:   GENERAL APPEARANCE: " Well developed, well nourished, alert, and in no apparent distress.  RESP: good air flow throughout.  No crackles. No rhonchi. No wheezes.  No inspiratory squeaks.  CV: Normal S1, S2, regular rhythm, normal rate. No murmur.  No LE edema.   MS: extremities normal. No clubbing. No cyanosis.  SKIN: no rash on limited exam.  NEURO: Mentation intact, speech normal.  PSYCH: mentation appears normal and affect normal/bright.      Results:  Recent Results (from the past 168 hour(s))   6 minute walk test    Collection Time: 09/20/24 12:00 AM   Result Value Ref Range    6 min walk (FT) 1,200 1,583 ft    6 Min Walk (M) 366 483 m   General PFT Lab (Please always keep checked)    Collection Time: 09/20/24  9:27 AM   Result Value Ref Range    FVC-Pred 3.12 L    FVC-Pre 1.42 L    FVC-%Pred-Pre 45 %    FEV1-Pre 0.88 L    FEV1-%Pred-Pre 34 %    FEV1FVC-Pred 82 %    FEV1FVC-Pre 62 %    FEFMax-Pred 6.65 L/sec    FEFMax-Pre 3.09 L/sec    FEFMax-%Pred-Pre 46 %    FEF2575-Pred 2.63 L/sec    FEF2575-Pre 0.44 L/sec    IOC4965-%Pred-Pre 16 %    ExpTime-Pre 6.78 sec    FIFMax-Pre 2.05 L/sec    VC-Pred 3.31 L    VC-Pre 1.44 L    VC-%Pred-Pre 43 %    IC-Pred 2.28 L    IC-Pre 1.17 L    IC-%Pred-Pre 51 %    ERV-Pred 1.14 L    ERV-Pre 0.26 L    ERV-%Pred-Pre 22 %    FEV1FEV6-Pred 83 %    FEV1FEV6-Pre 62 %    FRCPleth-Pred 2.63 L    FRCPleth-Pre 2.61 L    FRCPleth-%Pred-Pre 99 %    RVPleth-Pred 1.66 L    RVPleth-Pre 2.35 L    RVPleth-%Pred-Pre 141 %    TLCPleth-Pred 4.77 L    TLCPleth-Pre 3.79 L    TLCPleth-%Pred-Pre 79 %    DLCOunc-Pred 20.29 ml/min/mmHg    DLCOunc-Pre 17.13 ml/min/mmHg    DLCOunc-%Pred-Pre 84 %    VA-Pre 2.56 L    VA-%Pred-Pre 54 %    FEV1SVC-Pred 77 %    FEV1SVC-Pre 62 %   EKG 12-lead complete w/read - Clinics    Collection Time: 09/20/24 12:12 PM   Result Value Ref Range    Systolic Blood Pressure  mmHg    Diastolic Blood Pressure  mmHg    Ventricular Rate 89 BPM    Atrial Rate 89 BPM    ND Interval 120 ms    QRS Duration 76  ms     ms    QTc 442 ms    P Axis 67 degrees    R AXIS 47 degrees    T Axis 37 degrees    Interpretation ECG       Sinus rhythm  Normal ECG  When compared with ECG of 21-Jun-2012 10:28,  MANUAL COMPARISON REQUIRED PREVIOUS ECG IS INCOMPATIBLE         I reviewed pulmonary function test that was performed today.  This shows severe airflow obstruction with a normal diffusion.  Lung function is worse compared to 2014.    I reviewed 6-minute walk test from today.  Walk distance is reduced on room air without exertional hypoxemia.    I also reviewed HRCT today, which shows bronchiectasis, air trapping, and no pulmonary fibrosis.  It looks similar to last chest CT scan from 2014.  The pattern would be consistent with obliterative bronchiolitis.    I reviewed results with the patient.          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  1.  Obliterative bronchiolitis.  Chest CT is consistent, and she had a VATS lung biopsy confirming the diagnosis in 2007.  Clemencia has severe airflow obstruction on her PFT.  Of note, Clemencia's dyspnea on exertion and shortness of breath have worsened since the spring.  PFT today shows worse lung function compared to last PFT from 10 years ago.  Chest CT today is consistent with obliterative bronchiolitis without any new changes since 2014, specifically no interstitial lung disease, evidence of pneumonia or malignancy.  Obliterative bronchiolitis is a chronic irreversible process, and I discussed with Clemencia and her mother that there is no cure.  OB can be idiopathic, secondary to organ transplant, associated with underlying connective tissue disease or be a sequela of viral infection.  The goal of treatment is to try to control symptoms and prevent worsening lung function.  We will start by targeting airway inflammation with inhaled steroid, azithromycin and montelukast to  target different mechanisms of small airways inflammation.  We will use Symbicort 160 mcg 2 puffs twice daily, and I will have my medical assistant teach her technique and also give her a spacer.  I recommended that she gargle with water after use.  We also will use azithromycin 250 mg daily and montelukast 10 mg daily as additional airway anti-inflammatories.  I will get an EKG today before starting azithromycin.  She should avoid animals, incense or any other exposures that worsen her symptoms.  Treating her rheumatoid arthritis with methotrexate is okay from a pulmonary standpoint.  Her HRCT shows no interstitial lung disease.  We will try this regimen for 6 months.  If she is no better or if her OB worsens over the next 6 months, then I would recommend rituximab.  Lastly, we briefly discussed lung transplant.  She did not want to do this 10 years ago.  However, if she worsens despite maximal medical therapy, it might be an option if her arthritis can be controlled.  She will return in 3 months with full PFT.  2.  Healthcare maintenance.  She should get the flu vaccine and the new updated COVID-19 vaccine.  Addendum: EKG today shows normal sinus rhythm.  I will get a repeat EKG at her next appointment to follow-up after starting azithromycin.    The longitudinal plan of care for the diagnosis(es)/condition(s) as documented were addressed during this visit. Due to the added complexity in care, I will continue to support Clemencia in the subsequent management and with ongoing continuity of care.    I spent 109 minutes reviewing chart, talking with and examining patient, reviewing test results, formulating plan and documentation on the day of the encounter (excluding PFT review).        Again, thank you for allowing me to participate in the care of your patient.        Sincerely,        Elayne Appiah MD

## 2024-09-20 NOTE — PROGRESS NOTES
Clemencia Rosales comes into clinic today at the request of Dr Appiah , for PFT    Tolerated testing well. No adverse reactions. Left lab in no distress.        SCOTT SULTANA

## 2024-09-22 LAB
ATRIAL RATE - MUSE: 89 BPM
DIASTOLIC BLOOD PRESSURE - MUSE: NORMAL MMHG
INTERPRETATION ECG - MUSE: NORMAL
P AXIS - MUSE: 67 DEGREES
PR INTERVAL - MUSE: 120 MS
QRS DURATION - MUSE: 76 MS
QT - MUSE: 364 MS
QTC - MUSE: 442 MS
R AXIS - MUSE: 47 DEGREES
SYSTOLIC BLOOD PRESSURE - MUSE: NORMAL MMHG
T AXIS - MUSE: 37 DEGREES
VENTRICULAR RATE- MUSE: 89 BPM

## 2024-09-23 ENCOUNTER — MYC MEDICAL ADVICE (OUTPATIENT)
Dept: FAMILY MEDICINE | Facility: CLINIC | Age: 49
End: 2024-09-23
Payer: MEDICARE

## 2024-09-23 ENCOUNTER — MYC MEDICAL ADVICE (OUTPATIENT)
Dept: RHEUMATOLOGY | Facility: CLINIC | Age: 49
End: 2024-09-23
Payer: MEDICARE

## 2024-09-23 ENCOUNTER — TELEPHONE (OUTPATIENT)
Facility: CLINIC | Age: 49
End: 2024-09-23
Payer: MEDICARE

## 2024-09-23 DIAGNOSIS — J47.9 BRONCHIECTASIS (H): ICD-10-CM

## 2024-09-23 DIAGNOSIS — J45.909 MILD REACTIVE AIRWAYS DISEASE: Primary | ICD-10-CM

## 2024-09-23 DIAGNOSIS — J44.81 OBLITERATIVE BRONCHIOLITIS (H): ICD-10-CM

## 2024-09-23 DIAGNOSIS — M13.80 SERONEGATIVE ARTHRITIS: Primary | ICD-10-CM

## 2024-09-23 DIAGNOSIS — E89.0 POSTOPERATIVE HYPOTHYROIDISM: Primary | ICD-10-CM

## 2024-09-23 NOTE — TELEPHONE ENCOUNTER
Prior Authorization Retail Medication Request    Medication/Dose: Budesonide/Form 160/4.5mcg   Diagnosis and ICD code (if different than what is on RX):    New/renewal/insurance change PA/secondary ins. PA:  Previously Tried and Failed:    Rationale:      Insurance   Primary:   Insurance ID:      Secondary (if applicable):  Insurance ID:      Pharmacy Information (if different than what is on RX)  Name:    Phone:    Fax:

## 2024-09-23 NOTE — TELEPHONE ENCOUNTER
"Orders in place, help patient schedule lab only and nurse only appts  Thanks  Milena \"Jc\" SWAPNIL Moreau   "

## 2024-09-23 NOTE — TELEPHONE ENCOUNTER
LVM To call back and schedule a Lab appt for Labs and a Nurse Only appt for The Flu and Covid shots  Irene Manning  Care Unit Coordinator

## 2024-09-24 ENCOUNTER — ALLIED HEALTH/NURSE VISIT (OUTPATIENT)
Dept: FAMILY MEDICINE | Facility: CLINIC | Age: 49
End: 2024-09-24
Payer: MEDICARE

## 2024-09-24 ENCOUNTER — LAB (OUTPATIENT)
Dept: LAB | Facility: CLINIC | Age: 49
End: 2024-09-24
Attending: PHYSICIAN ASSISTANT
Payer: MEDICARE

## 2024-09-24 DIAGNOSIS — Z23 HIGH PRIORITY FOR 2019-NCOV VACCINE: ICD-10-CM

## 2024-09-24 DIAGNOSIS — Z23 NEED FOR PROPHYLACTIC VACCINATION AND INOCULATION AGAINST INFLUENZA: Primary | ICD-10-CM

## 2024-09-24 DIAGNOSIS — E89.0 POSTOPERATIVE HYPOTHYROIDISM: ICD-10-CM

## 2024-09-24 LAB
DLCOUNC-%PRED-PRE: 84 %
DLCOUNC-PRE: 17.13 ML/MIN/MMHG
DLCOUNC-PRED: 20.29 ML/MIN/MMHG
ERV-%PRED-PRE: 22 %
ERV-PRE: 0.26 L
ERV-PRED: 1.14 L
EXPTIME-PRE: 6.78 SEC
FEF2575-%PRED-PRE: 16 %
FEF2575-PRE: 0.44 L/SEC
FEF2575-PRED: 2.63 L/SEC
FEFMAX-%PRED-PRE: 46 %
FEFMAX-PRE: 3.09 L/SEC
FEFMAX-PRED: 6.65 L/SEC
FEV1-%PRED-PRE: 34 %
FEV1-PRE: 0.88 L
FEV1FEV6-PRE: 62 %
FEV1FEV6-PRED: 83 %
FEV1FVC-PRE: 62 %
FEV1FVC-PRED: 82 %
FEV1SVC-PRE: 62 %
FEV1SVC-PRED: 77 %
FIFMAX-PRE: 2.05 L/SEC
FRCPLETH-%PRED-PRE: 99 %
FRCPLETH-PRE: 2.61 L
FRCPLETH-PRED: 2.63 L
FVC-%PRED-PRE: 45 %
FVC-PRE: 1.42 L
FVC-PRED: 3.12 L
IC-%PRED-PRE: 51 %
IC-PRE: 1.17 L
IC-PRED: 2.28 L
RVPLETH-%PRED-PRE: 141 %
RVPLETH-PRE: 2.35 L
RVPLETH-PRED: 1.66 L
T4 FREE SERPL-MCNC: 1.24 NG/DL (ref 0.9–1.7)
TLCPLETH-%PRED-PRE: 79 %
TLCPLETH-PRE: 3.79 L
TLCPLETH-PRED: 4.77 L
TSH SERPL DL<=0.005 MIU/L-ACNC: 5.1 UIU/ML (ref 0.3–4.2)
VA-%PRED-PRE: 54 %
VA-PRE: 2.56 L
VC-%PRED-PRE: 43 %
VC-PRE: 1.44 L
VC-PRED: 3.31 L

## 2024-09-24 PROCEDURE — 36415 COLL VENOUS BLD VENIPUNCTURE: CPT

## 2024-09-24 PROCEDURE — 84443 ASSAY THYROID STIM HORMONE: CPT

## 2024-09-24 PROCEDURE — 90656 IIV3 VACC NO PRSV 0.5 ML IM: CPT

## 2024-09-24 PROCEDURE — 99207 PR NO CHARGE NURSE ONLY: CPT | Mod: 25

## 2024-09-24 PROCEDURE — 84439 ASSAY OF FREE THYROXINE: CPT

## 2024-09-24 PROCEDURE — 91320 SARSCV2 VAC 30MCG TRS-SUC IM: CPT

## 2024-09-24 PROCEDURE — 90480 ADMN SARSCOV2 VAC 1/ONLY CMP: CPT

## 2024-09-24 PROCEDURE — G0008 ADMIN INFLUENZA VIRUS VAC: HCPCS

## 2024-09-25 NOTE — TELEPHONE ENCOUNTER
Retail Pharmacy Prior Authorization Team   Phone: [phone]    PA Initiation    Medication: BREYNA 160-4.5 MCG/ACT IN AERO  Insurance Company: [employer] - Phone [phone] Fax [phone]  Pharmacy Filling the Rx: [id number] - [place], [place] - [facility] [facility] [address]  Filling Pharmacy Phone: [phone]  Filling Pharmacy Fax:    Start Date: 9/25/2024

## 2024-09-26 RX ORDER — METHOTREXATE 2.5 MG/1
17.5 TABLET ORAL
Qty: 28 TABLET | Refills: 2 | Status: SHIPPED | OUTPATIENT
Start: 2024-09-26

## 2024-09-26 NOTE — TELEPHONE ENCOUNTER
Retail Pharmacy Prior Authorization Team   Phone: [phone]    PRIOR AUTHORIZATION DENIED    Medication: BREYNA 160-4.5 MCG/ACT IN AERO  Insurance Company: [employer] - Phone [phone] Fax [phone]  Denial Date: 9/26/2024  Denial Reason(s): NOT A MEDICALLY ACCEPTED DX    Appeal Information:

## 2024-09-26 NOTE — RESULT ENCOUNTER NOTE
"Mercy Khanna  Your attached TSH levels are elevated. I just want to confirm your current synthroid dose, but we should increase it further and recheck labs in 8-10 weeks after that change.  Let me know and I'll send a new prescription.    Please contact the office with any questions or concerns.    Milena Allen \"Jc\" SWAPNIL Moreau    "

## 2024-09-30 RX ORDER — BUDESONIDE AND FORMOTEROL FUMARATE DIHYDRATE 160; 4.5 UG/1; UG/1
2 AEROSOL RESPIRATORY (INHALATION) 2 TIMES DAILY
Qty: 10.2 G | Refills: 11 | Status: SHIPPED | OUTPATIENT
Start: 2024-09-30 | End: 2024-10-03 | Stop reason: ALTCHOICE

## 2024-10-02 ENCOUNTER — MYC MEDICAL ADVICE (OUTPATIENT)
Dept: PULMONOLOGY | Facility: CLINIC | Age: 49
End: 2024-10-02
Payer: MEDICARE

## 2024-10-02 DIAGNOSIS — J45.909 MILD REACTIVE AIRWAYS DISEASE: ICD-10-CM

## 2024-10-02 DIAGNOSIS — J47.1 BRONCHIECTASIS WITH (ACUTE) EXACERBATION (H): Primary | ICD-10-CM

## 2024-10-03 RX ORDER — FLUTICASONE FUROATE AND VILANTEROL 200; 25 UG/1; UG/1
1 POWDER RESPIRATORY (INHALATION) DAILY
Qty: 60 EACH | Refills: 11 | Status: SHIPPED | OUTPATIENT
Start: 2024-10-03 | End: 2024-10-18

## 2024-10-08 ENCOUNTER — TELEPHONE (OUTPATIENT)
Facility: CLINIC | Age: 49
End: 2024-10-08
Payer: MEDICARE

## 2024-10-08 NOTE — TELEPHONE ENCOUNTER
Prior Authorization Retail Medication Request    Medication/Dose: Fluticasone- Vilanterol 200-25MCG INH  Diagnosis and ICD code (if different than what is on RX):    New/renewal/insurance change PA/secondary ins. PA:  Previously Tried and Failed:    Rationale:      Insurance   Primary:   Insurance ID:      Secondary (if applicable):  Insurance ID:      Pharmacy Information (if different than what is on RX)  Name:    Phone:    Fax:    Clinic Information  Preferred routing pool for dept communication:

## 2024-10-09 ENCOUNTER — MYC MEDICAL ADVICE (OUTPATIENT)
Dept: RHEUMATOLOGY | Facility: CLINIC | Age: 49
End: 2024-10-09
Payer: MEDICARE

## 2024-10-09 DIAGNOSIS — M13.80 SERONEGATIVE ARTHRITIS: Primary | ICD-10-CM

## 2024-10-09 RX ORDER — METHOTREXATE 2.5 MG/1
15 TABLET ORAL
Qty: 24 TABLET | Refills: 2 | Status: SHIPPED | OUTPATIENT
Start: 2024-10-09

## 2024-10-10 NOTE — TELEPHONE ENCOUNTER
Prior Authorization Not Needed per Insurance    Medication: FLUTICASONE FUROATE-VILANTEROL 200-25 MCG/ACT IN AEPB  Insurance Company: [employer] - Phone [phone] Fax [phone]  Expected CoPay: $    Pharmacy Filling the Rx: [id number] - [place], [place] - [address] [place] [address] [address]  Pharmacy Notified: y  Patient Notified: Instructed pharmacy to notify patient once order is ready.     Called pharmacy as plan prefers brand name. Pharmacy confirmed paid claim. PA not needed at this time.

## 2024-10-16 ENCOUNTER — VIRTUAL VISIT (OUTPATIENT)
Dept: PHARMACY | Facility: CLINIC | Age: 49
End: 2024-10-16
Attending: INTERNAL MEDICINE
Payer: MEDICARE

## 2024-10-16 DIAGNOSIS — M13.80 SERONEGATIVE INFLAMMATORY ARTHRITIS: Primary | ICD-10-CM

## 2024-10-16 DIAGNOSIS — Z71.85 VACCINE COUNSELING: ICD-10-CM

## 2024-10-16 DIAGNOSIS — E03.9 HYPOTHYROIDISM: ICD-10-CM

## 2024-10-16 RX ORDER — LEVOTHYROXINE SODIUM 125 UG/1
125 TABLET ORAL DAILY
COMMUNITY

## 2024-10-16 RX ORDER — DIPHENHYDRAMINE HCL, IBUPROFEN 25; 200 MG/1; MG/1
1 CAPSULE, LIQUID FILLED ORAL
COMMUNITY

## 2024-10-16 NOTE — PROGRESS NOTES
Medication Therapy Management (MTM) Encounter    ASSESSMENT:                            Medication Adherence/Access: No issues identified.    Seronegative inflammatory arthritis: Patient had not been taking methotrexate as prescribed, was only taking 2.5 mg weekly instead of 15 mg weekly. Would benefit from increasing dose to original intended starting dose. Note interaction with ibuprofen and aspirin, may increase methotrexate concentrations although this interaction is more significant for methotrexate doses used for oncology conditions. Would recommend monitoring for side effects and lab abnormalities, will recheck lab in 4 weeks.    Vaccine counseling: Indicated for Shingrix per ACIP recommendations for patients on immunosuppressing medications.    Hypothyroidism/Grave's disease: TSH is above goal, levothyroxine dose increased several weeks ago. She is taking the mediation appropriately so would not expect fluctuate is due to administration change. Managed by primary care provider.    PLAN:                            Increase methotrexate dose from 2.5 (1 tablet) to 15 mg (6 tablets) weekly. Report any infections, planned surgeries, or disease flares to rheumatology department.  Continue to consider the following vaccines: Shingrix.  Continue levothyroxine 125 mcg daily with TSH follow up by primary care.    Follow-up: 4 weeks for tolerability and lab update. Sees rheumatologist 11/11/24.    SUBJECTIVE/OBJECTIVE:                          Clemencia Rosales is a 48 year old female called for a follow up visit from 8/19/24. She was referred to me from Larissa Espinoza MD.    Reason for visit: Methotrexate adherence/dose adjustment.    Allergies/ADRs: Reviewed in chart  Past Medical History: Reviewed in chart  Tobacco: She reports that she has never smoked. She has never used smokeless tobacco.  Alcohol: does not drink    Medication Adherence/Access: No issues identified.    Seronegative inflammatory arthritis:    Methotrexate 15 mg every 7 days  Folic acid 1 mg daily    Patient reports she was only taking one methotrexate per week instead of the prescribed six tablets. She did take six tablets this most recent dose, did not notice any side effects. She is willing to proceed with the correct dose. Has been taking it since early June but hadn't had any benefit.  Has not had any improvement in pain or swelling and is concerned because walking has started to become difficult due to pain in ankles and hips. She does enjoy walking long distances so being able to do this is important to her. She also reports consistent morning stiffness starting at 3-4 am which does improve after several hours but is also exacerbated by activity later in the day.    Vaccine counseling: Open to receiving recommended vaccines.    Immunization History   Administered Date(s) Administered    COVID-19 12+ (Pfizer) 11/06/2023, 09/24/2024    COVID-19 Bivalent 18+ (Moderna) 11/11/2022    COVID-19 MONOVALENT 12+ (Pfizer) 05/04/2021, 05/25/2021    COVID-19 Monovalent 12+ (Pfizer 2022) 02/25/2022    Influenza (H1N1) 12/23/2009    Influenza (IIV3) PF 12/02/2005, 11/07/2007, 11/14/2008, 09/09/2009, 10/05/2010, 09/15/2011, 09/17/2012, 10/05/2014    Influenza Vaccine >6 months,quad, PF 10/18/2013, 09/20/2016, 10/10/2017, 11/02/2018, 10/05/2021, 11/28/2022, 11/17/2023    Influenza Vaccine, 6+MO IM (QUADRIVALENT W/PRESERVATIVES) 10/30/2015, 10/03/2019, 11/05/2020    Influenza, Split Virus, Trivalent, Pf (Fluzone\Fluarix) 09/24/2024    Influenza, seasonal, injectable, PF 10/20/2015    Pneumococcal 20 valent Conjugate (Prevnar 20) 01/23/2023    Pneumococcal 23 valent 03/19/2014    TDAP (Adacel,Boostrix) 04/21/2023    TDAP Vaccine (Adacel) 01/04/2012      Hypothyroidism/Grave's disease:   Levothyroxine 125 mcg daily    Dose was adjusted recently due to elevated TSH. She has not changed anything about medication administration and is  by 1 hour from food and  other medications. It is typical for her to fluctuate between needing 112 and 125 mcg dose.    TSH   Date Value Ref Range Status   09/24/2024 5.10 (H) 0.30 - 4.20 uIU/mL Final   10/20/2022 3.15 0.40 - 4.00 mU/L Final   06/06/2019 1.99 0.40 - 4.00 mU/L Final       Today's Vitals: none taken today  ----------------    I spent 30 minutes with this patient today. All changes were made via collaborative practice agreement with Larissa Espinoza MD. A copy of the visit note was provided to the patient's provider(s).    A summary of these recommendations was sent via [employer].    Glory Quiroz, PharmD  Medication Therapy Management Pharmacist  [facility]     Telemedicine Visit Details  Type of service: Telephone visit  Start Time: 1400  End Time: 1430     Medication Therapy Recommendations  Inflammatory arthritis    Current Medication: methotrexate 2.5 MG tablet (Discontinued)   Rationale: Dose too low - Dosage too low - Effectiveness   Recommendation: Increase Dose - Consider increasing dose given ongoing symptoms   Status: No Longer Relevant   Note: Update - patient was not taking as prescribed, adherence intervention provided

## 2024-10-16 NOTE — PATIENT INSTRUCTIONS
"Recommendations from today's MTM visit:                                                      Increase methotrexate dose from 2.5 (1 tablet) to 15 mg (6 tablets) weekly. Report any infections, planned surgeries, or disease flares to rheumatology department.  Continue to consider the following vaccines: Shingrix.    Follow-up: 4 weeks for tolerability and lab update. Sees rheumatologist 11/11/24.    It was great speaking with you today.  I value your experience and would be very thankful for your time in providing feedback in our clinic survey. In the next few days, you may receive an email or text message from [employer] with a link to a survey related to your  clinical pharmacist.\"    To schedule another MTM appointment, please call the clinic directly or you may call the MTM scheduling line at [phone].    My Clinical Pharmacist's contact information:                                                      Please feel free to contact me with any questions or concerns you have.      Glory Quiroz, PharmD  Medication Therapy Management Pharmacist  [facility]  "

## 2024-10-16 NOTE — Clinical Note
Talked to patient and clarified dose, she has started taking 6 tablets (15 mg) weekly as prescribed. Sees you in November Dr. Espinoza, can we have labs done at that visit? I will follow up after for med plan. Thanks!

## 2024-10-18 ENCOUNTER — PATIENT OUTREACH (OUTPATIENT)
Dept: CARE COORDINATION | Facility: CLINIC | Age: 49
End: 2024-10-18
Payer: MEDICARE

## 2024-10-18 ENCOUNTER — MYC MEDICAL ADVICE (OUTPATIENT)
Dept: PULMONOLOGY | Facility: CLINIC | Age: 49
End: 2024-10-18
Payer: MEDICARE

## 2024-10-18 DIAGNOSIS — J47.1 BRONCHIECTASIS WITH (ACUTE) EXACERBATION (H): ICD-10-CM

## 2024-10-18 DIAGNOSIS — J45.909 MILD REACTIVE AIRWAYS DISEASE: ICD-10-CM

## 2024-10-18 RX ORDER — FLUTICASONE FUROATE AND VILANTEROL 100; 25 UG/1; UG/1
1 POWDER RESPIRATORY (INHALATION) DAILY
Qty: 60 EACH | Refills: 3 | Status: SHIPPED | OUTPATIENT
Start: 2024-10-18

## 2024-10-21 ENCOUNTER — TELEPHONE (OUTPATIENT)
Facility: CLINIC | Age: 49
End: 2024-10-21

## 2024-10-21 NOTE — TELEPHONE ENCOUNTER
Prior Authorization Retail Medication Request    Medication/Dose: Fluticasone- Vilanterol 100-25MCG INH   Diagnosis and ICD code (if different than what is on RX):    New/renewal/insurance change PA/secondary ins. PA:  Previously Tried and Failed:    Rationale:      Insurance   Primary:   Insurance ID:      Secondary (if applicable):  Insurance ID:      Pharmacy Information (if different than what is on RX)  Name:    Phone:    Fax:    Clinic Information  Preferred routing pool for dept communication: [facility] nurses

## 2024-10-22 NOTE — TELEPHONE ENCOUNTER
Left VM for pharmacy - per review looks like brand name Breo Ellipta IS covered by insurance per previous notes.     Attempting to confirm with pharmacy whether or not medication has been filled? Unsure of strength at this point as med has been changed 3 times now.

## 2024-10-23 ENCOUNTER — MYC MEDICAL ADVICE (OUTPATIENT)
Dept: FAMILY MEDICINE | Facility: CLINIC | Age: 49
End: 2024-10-23
Payer: MEDICARE

## 2024-10-31 NOTE — TELEPHONE ENCOUNTER
"Prior Authorization Not Needed per Insurance    Medication: BREO ELLIPTA 100-25 MCG/ACT IN AEPB  Insurance Company: [employer] Non-Specialty PA's - Phone [phone] Fax [phone]  Expected CoPay: $    Pharmacy Filling the Rx: [id number] - [place], [place] - [address]  Pharmacy Notified: yes  Patient Notified: Instructed pharmacy to notify patient once order is ready. Order is being processed as of 10/31/2024    PRIOR AUTH NOT REQUIRED - sat on hold to speak with a human since this is my 3rd time calling and no replies to my previously left messages.     Per pharmacy today the staff there informed me that \"nobody here knows how to check the voicemail and there are currently over 200 messages awaiting response.\"  I did end up calling the [employer] complaints line [phone] and filed a complaint. (Case id is my phone number [id number])    " Continue the prior given HOME EXERCISE PROGRAM      Standing tall with the right foot a step forward and near a counter or walker(for safety but do not hold on).  -Weight shift forward and backward 30-60 seconds.   Repeat with the left foot a step forward.     2x/wk.   -gentle rotation of the body side to side for 30-60 seconds.     CANE lunge.   Cane in the left hand:  -Step forward with the right foot and the cane moves forward.  Both return step.  10-12 slow reps.]  -Step forward with the left foot(cane remains still).  Return step.  10-12 slow reps.    2x/day.    Desensitize the incisional area by rubbing a cloth about the area for 2-3 minutes.  1-2x/day.      WALKING: Heel - then near straight knee - off toes  - and bend the knee.  Relax the shoulders so the elbows are bent.   You may walk in the house with a cane in the left hand for short distances  You may sleep on your side(pillow between the knees).

## 2024-11-05 ENCOUNTER — PATIENT OUTREACH (OUTPATIENT)
Dept: CARE COORDINATION | Facility: CLINIC | Age: 49
End: 2024-11-05
Payer: MEDICARE

## 2024-11-08 DIAGNOSIS — Z86.79 H/O SINUS TACHYCARDIA: ICD-10-CM

## 2024-11-11 ENCOUNTER — OFFICE VISIT (OUTPATIENT)
Dept: RHEUMATOLOGY | Facility: CLINIC | Age: 49
End: 2024-11-11
Attending: INTERNAL MEDICINE
Payer: MEDICARE

## 2024-11-11 VITALS
SYSTOLIC BLOOD PRESSURE: 143 MMHG | OXYGEN SATURATION: 96 % | BODY MASS INDEX: 22.64 KG/M2 | DIASTOLIC BLOOD PRESSURE: 83 MMHG | HEART RATE: 110 BPM | WEIGHT: 127 LBS

## 2024-11-11 DIAGNOSIS — Z79.899 LONG-TERM USE OF HIGH-RISK MEDICATION: ICD-10-CM

## 2024-11-11 DIAGNOSIS — M19.90 INFLAMMATORY ARTHRITIS: Primary | ICD-10-CM

## 2024-11-11 DIAGNOSIS — J44.9 OBSTRUCTIVE LUNG DISEASE (H): ICD-10-CM

## 2024-11-11 DIAGNOSIS — M14.89 ARTHROPATHIES IN OTHER SPECIFIED DISEASES CLASSIFIED ELSEWHERE, MULTIPLE SITES: ICD-10-CM

## 2024-11-11 DIAGNOSIS — I73.00 RAYNAUD'S DISEASE WITHOUT GANGRENE: ICD-10-CM

## 2024-11-11 PROCEDURE — 99214 OFFICE O/P EST MOD 30 MIN: CPT | Performed by: INTERNAL MEDICINE

## 2024-11-11 PROCEDURE — G0463 HOSPITAL OUTPT CLINIC VISIT: HCPCS | Performed by: INTERNAL MEDICINE

## 2024-11-11 RX ORDER — METOPROLOL SUCCINATE 50 MG/1
TABLET, EXTENDED RELEASE ORAL
Qty: 180 TABLET | Refills: 0 | Status: SHIPPED | OUTPATIENT
Start: 2024-11-11

## 2024-11-11 ASSESSMENT — PAIN SCALES - GENERAL: PAINLEVEL_OUTOF10: MODERATE PAIN (5)

## 2024-11-11 NOTE — NURSING NOTE
Chief Complaint   Patient presents with    RECHECK     BP (!) 143/83 (BP Location: Right arm, Patient Position: Sitting, Cuff Size: Adult Regular)   Pulse 110   Wt 57.6 kg (127 lb)   SpO2 96%   BMI 22.64 kg/m

## 2024-11-11 NOTE — PROGRESS NOTES
Chief Complaint/Reason for Visit: Inflammatory arthritis    HPI:    Autumn JESSIKA Rosales  is a 48 year old  female with past medical history listed below.  She had covid end of October. Her symptoms were cold, fever, loss of taste and smell. She continued to be functional during this time period. She continued methotrexate during this time. She is on methotrexate 15 mg weekly along with folic acid 1 mg daily ( 4th dose). She is working on an anti inflammatory diet. Has ongoing swelling of ankles, though improved compared to before. Also has occasional wrist pain.     REVIEW OF SYSTEMS    Cardiovascular - neg for chest pain  Respiratory - pos for sob,follows up with pulmonology  Gastrointestinal - neg for bloody diarrhea  Skin -she was diagnosed with geena's diease, follows up with Derm at OSH  Neuro - neg for stroke or seizure  Hematologic - neg for blood clots  OBS/GYN- never been pregnant  Maternal aunt - RA       Past Medical History:   Diagnosis Date    Arrhythmia     Arthritis     COPD (chronic obstructive pulmonary disease) (H)     Not COPD, rare lung disease Obliterans Bronchiolitis    Fibromyalgia 2008    [place]    Graves disease     radioactive iodine    Head pain 01/16/2015    MEDICAL HISTORY OF -     20 ativan per month (menstral period/BOOP)    obliterative bronchiolitis     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 Sanches at [place]; lung bx 2007 bronchiolitis and bronchiectasis; had severe viral pneumonia age 15    Other congenital anomaly of lung     restrictive/obstructive disease,  30% lung capapcity    Pneumonia     viral pneumonia-age 14yo, was intubated in the ICU    RA (rheumatoid arthritis) (H)     seronegative RA dx 2024    Sinus tachycardia     Temporary low platelet count (H) 06/12/2014    Thyrotoxicosis without mention of goiter or other cause, without mention of thyrotoxic crisis or storm 2002    Treated with irradiation-on synthroid     Past Surgical History:   Procedure  "Laterality Date    BIOPSY  2007    lung biopsy    THORACOSCOPIC BIOPSY RIB      x4    thyroid radiation      secondary to graves     Family History   Problem Relation Age of Onset    Diabetes Father     Other Cancer Maternal Grandfather         lung cancer    Diabetes Paternal Aunt     Diabetes Paternal Uncle     Asthma Other     Thyroid Disease Other     Asthma Other     Asthma Paternal Uncle      Social History     Socioeconomic History    Marital status: Single   Tobacco Use    Smoking status: Never    Smokeless tobacco: Never   Substance and Sexual Activity    Alcohol use: No    Drug use: No    Sexual activity: Not Currently   Other Topics Concern    Parent/sibling w/ CABG, MI or angioplasty before 65F 55M? No    Caffeine Concern No     Comment: occasional small size coke     Exercise Yes     Comment: rehab    Seat Belt Yes       Allergies   Allergen Reactions    Sulfa Antibiotics Other (See Comments)     Never taken medication however \"was told to put it down just in case\"       Current Outpatient Medications   Medication Sig Dispense Refill    albuterol (PROAIR HFA/PROVENTIL HFA/VENTOLIN HFA) 108 (90 Base) MCG/ACT inhaler INHALE 1 TO 2 PUFFS INTO THE LUNGS EVERY 4 HOURS AS NEEDED FOR SHORTNESS OF BREATH OR DIFFICULT BREATHING OR WHEEZING 18 g 0    aspirin 325 MG tablet Take  by mouth daily. Takes 2 tablets as needed      azithromycin (ZITHROMAX) 250 MG tablet Take 1 tablet (250 mg) by mouth daily. 90 tablet 3    BENADRYL OR AS NEEDED      benzonatate (TESSALON) 100 MG capsule Take 1-2 capsules (100-200 mg) by mouth 3 times daily as needed for cough 30 capsule 0    Cholecalciferol (VITAMIN D PO)       cyclobenzaprine (FLEXERIL) 10 MG tablet TAKE 1/2 TO 1 TABLET(5 TO 10 MG) BY MOUTH THREE TIMES DAILY AS NEEDED FOR MUSCLE SPASMS 90 tablet 0    fluocinonide (LIDEX) 0.05 % external solution       fluticasone (FLONASE) 50 MCG/ACT nasal spray Spray 1 spray into both nostrils daily 16 mL 11    fluticasone-vilanterol " (BREO ELLIPTA) 100-25 MCG/ACT inhaler Inhale 1 puff into the lungs daily. 60 each 3    folic acid (FOLVITE) 1 MG tablet Take 1 tablet (1 mg) by mouth daily 90 tablet 3    gabapentin (NEURONTIN) 300 MG capsule Take 1 capsule (300 mg) by mouth 3 times daily 90 capsule 5    hydrocortisone 2.5 % cream   2    hydrocortisone valerate (WEST-GISELE) 0.2 % ointment Apply sparingly to affected area three times daily as needed. 45 g 0    Ibuprofen-diphenhydrAMINE HCl (ADVIL PM) 200-25 MG CAPS Take 1 tablet by mouth nightly as needed.      ketoconazole (NIZORAL) 2 % external shampoo       levothyroxine (SYNTHROID/LEVOTHROID) 125 MCG tablet Take 125 mcg by mouth daily.      methotrexate 2.5 MG tablet Take 6 tablets (15 mg) by mouth every 7 days. Labs every 8 - 12 weeks for refills. 24 tablet 2    metoprolol succinate ER (TOPROL XL) 50 MG 24 hr tablet TAKE 2 TABLETS(100 MG) BY MOUTH DAILY 180 tablet 0    montelukast (SINGULAIR) 10 MG tablet Take 1 tablet (10 mg) by mouth at bedtime. 90 tablet 3     No current facility-administered medications for this visit.       PHYSICAL EXAM    BP (!) 143/83 (BP Location: Right arm, Patient Position: Sitting, Cuff Size: Adult Regular)   Pulse 110   Wt 57.6 kg (127 lb)   SpO2 96%   BMI 22.64 kg/m        General: Alert, No apparent distress   Psych: Affect euthymic  Eyes: Sclera non injected  Neuro: Normal gait and Able to arise from seated position unassisted  Musculoskeletal: Hands:  Normal. No synovitis.   Wrists:  Normal.  Elbows:  Normal.  Shoulders:  Normal.  Feet:  Normal.  Ankles:  minimal swelling + right and left ankles  Knees:  Normal.    LABS   Reviewed as below.     July 2024  CBC normal   CMP normal creatinine and LFT    Feb 2024  QTB neg     2024  CCP neg   ACE level normal  FERMIN neg    Nov 2023  T4 normal   T3 low, TSH elevated at 4.52    2022  ARABELLA neg   RF neg     2021  HCV neg   HIV neg   Vit D normal   ESR, CRP normal      Latest Reference Range & Units 10/05/21 16:03  02/26/24 15:34   Hep B Surface Agn Nonreactive   Nonreactive   Hepatitis B Core Areli Nonreactive   Nonreactive   Hepatitis B Surface Antibody Instrument Value <8.5 m[IU]/mL  <3.50   Hepatitis B Surface Antibody   Nonreactive   Hepatitis C Antibody Nonreactive  Nonreactive    HIV Antigen Antibody Combo Nonreactive  Nonreactive        XR HAND RIGHT G/E 3 VIEWS 4/21/2023 2:07 PM     HISTORY: Nodule of finger of right hand, finger pain.     COMPARISON: None.                                                                      IMPRESSION: No fracture. Normal alignment. No erosive changes.    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                                                                       IMPRESSION: Since February 10, 2021, heart size is normal. No pleural  effusion, pneumothorax, or abnormal area of consolidation. Scoliotic  curvature of the thoracolumbar spine, apex left.     Narrative & Impression   RIGHT FOOT THREE OR MORE VIEWS  10/5/2021 3:44 PM      HISTORY: Foot swelling.     COMPARISON: None.                                                                      IMPRESSION: No evidence of acute fracture. Normal joint spacing and  alignment.     RIGHT ANKLE THREE OR MORE VIEWS  10/5/2021 3:43 PM      HISTORY: Right ankle and foot pain and swelling.     COMPARISON: None.                                                                      IMPRESSION: Normal joint spacing and alignment. No evidence of acute  fracture.  ASSESSMENT      1. Seronegative arthritis    2. Arthropathies in other specified diseases classified elsewhere, multiple sites    3. Inflammatory arthritis          Inflammatory arthritis (primary encounter diagnosis)  Comment: RF,CCP neg.Pain and swelling of both ankles as well as the feet since 2021.  She states lately it has been the right ankle that is being more swollen but both ankles do swell up at the same time symptoms as  well. X ray right hand and ankle normal. She does not have clinical features of lupus, had a neg ARABELLA in the past. Differentials : seroneg RA/SpA/reactive arthritis? MRI left ankle - minimal fluid in the left ankle joint. MRI right ankle - Low to moderate grade partial tear deep portion of deltoid ligament. Mild achilles peritendinitis.FSERO test came back pos for 14-3-3 ETA. Started on methotrexate in May 2024. Currently on methotrexate 15 mg weekly along with folic acid 1 mg daily. Continues to have swelling of ankles, improved from last time.   Plan:   Continue methotrexate 15 mg weekly.   Continue folic acid 1 mg daily.   Orders Placed This Encounter   Procedures    Comprehensive metabolic panel    CRP inflammation    CBC with platelets differential       (J44.9) Obstructive lung disease (H)  Comment: noted  Plan: ref to Pulm     (I73.00) Raynaud's disease without gangrene  Comment: no raynaud's on exam   Plan: work up as above     Long term use of high-risk medication  Comment : methotrexate  Plan : The adverse reactions of MTX was discussed which includes cytopenia, immunosuppression, infection, oversedation, pneumonitis, dizziness, nausea, stomach upset, risk of falls, seizures, and derangements in LFTs. Recommended against use of alcohol while on MTX. pt counseled on the adverse effects of Methotrexate including teratogenicity and need for reliable contraception, Alopecia, infection, liver dysfunction and myelosuppression, including the importance of taking daily Folic acid. Patient verbalized understanding and agrees to proceed.    RTC - 12 weeks.         JOSE ARZOLA MD    Division of Rheumatic & Autoimmune Diseases  [facility]

## 2024-11-11 NOTE — LETTER
11/11/2024       RE: Clemencia Rosales  [address]  [facility] [address]     Dear Colleague,    Thank you for referring your patient, Clemencia Roslaes, to the [facility] RHEUMATOLOGY at [facility]. Please see a copy of my visit note below.                         Chief Complaint/Reason for Visit: Inflammatory arthritis    HPI:    Clemencia Rosales  is a 48 year old  female with past medical history listed below.  She had covid end of October. Her symptoms were cold, fever, loss of taste and smell. She continued to be functional during this time period. She continued methotrexate during this time. She is on methotrexate 15 mg weekly along with folic acid 1 mg daily ( 4th dose). She is working on an anti inflammatory diet. Has ongoing swelling of ankles, though improved compared to before. Also has occasional wrist pain.     REVIEW OF SYSTEMS    Cardiovascular - neg for chest pain  Respiratory - pos for sob,follows up with pulmonology  Gastrointestinal - neg for bloody diarrhea  Skin -she was diagnosed with geena's diease, follows up with Derm at OSH  Neuro - neg for stroke or seizure  Hematologic - neg for blood clots  OBS/GYN- never been pregnant  Maternal aunt - RA       Past Medical History:   Diagnosis Date     Arrhythmia      Arthritis      COPD (chronic obstructive pulmonary disease) (H)     Not COPD, rare lung disease Obliterans Bronchiolitis     Fibromyalgia 2008    [place]     Graves disease     radioactive iodine     Head pain 01/16/2015     MEDICAL HISTORY OF -     20 ativan per month (menstral period/BOOP)     obliterative bronchiolitis     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 Sanches at [place]; lung bx 2007 bronchiolitis and bronchiectasis; had severe viral pneumonia age 15     Other congenital anomaly of lung     restrictive/obstructive disease,  30% lung capapcity     Pneumonia     viral pneumonia-age 16yo, was intubated in the  "ICU     RA (rheumatoid arthritis) (H)     seronegative RA dx 2024     Sinus tachycardia      Temporary low platelet count (H) 06/12/2014     Thyrotoxicosis without mention of goiter or other cause, without mention of thyrotoxic crisis or storm 2002    Treated with irradiation-on synthroid     Past Surgical History:   Procedure Laterality Date     BIOPSY  2007    lung biopsy     THORACOSCOPIC BIOPSY RIB      x4     thyroid radiation      secondary to graves     Family History   Problem Relation Age of Onset     Diabetes Father      Other Cancer Maternal Grandfather         lung cancer     Diabetes Paternal Aunt      Diabetes Paternal Uncle      Asthma Other      Thyroid Disease Other      Asthma Other      Asthma Paternal Uncle      Social History     Socioeconomic History     Marital status: Single   Tobacco Use     Smoking status: Never     Smokeless tobacco: Never   Substance and Sexual Activity     Alcohol use: No     Drug use: No     Sexual activity: Not Currently   Other Topics Concern     Parent/sibling w/ CABG, MI or angioplasty before 65F 55M? No     Caffeine Concern No     Comment: occasional small size coke      Exercise Yes     Comment: rehab     Seat Belt Yes       Allergies   Allergen Reactions     Sulfa Antibiotics Other (See Comments)     Never taken medication however \"was told to put it down just in case\"       Current Outpatient Medications   Medication Sig Dispense Refill     albuterol (PROAIR HFA/PROVENTIL HFA/VENTOLIN HFA) 108 (90 Base) MCG/ACT inhaler INHALE 1 TO 2 PUFFS INTO THE LUNGS EVERY 4 HOURS AS NEEDED FOR SHORTNESS OF BREATH OR DIFFICULT BREATHING OR WHEEZING 18 g 0     aspirin 325 MG tablet Take  by mouth daily. Takes 2 tablets as needed       azithromycin (ZITHROMAX) 250 MG tablet Take 1 tablet (250 mg) by mouth daily. 90 tablet 3     BENADRYL OR AS NEEDED       benzonatate (TESSALON) 100 MG capsule Take 1-2 capsules (100-200 mg) by mouth 3 times daily as needed for cough 30 capsule 0 "     Cholecalciferol (VITAMIN D PO)        cyclobenzaprine (FLEXERIL) 10 MG tablet TAKE 1/2 TO 1 TABLET(5 TO 10 MG) BY MOUTH THREE TIMES DAILY AS NEEDED FOR MUSCLE SPASMS 90 tablet 0     fluocinonide (LIDEX) 0.05 % external solution        fluticasone (FLONASE) 50 MCG/ACT nasal spray Spray 1 spray into both nostrils daily 16 mL 11     fluticasone-vilanterol (BREO ELLIPTA) 100-25 MCG/ACT inhaler Inhale 1 puff into the lungs daily. 60 each 3     folic acid (FOLVITE) 1 MG tablet Take 1 tablet (1 mg) by mouth daily 90 tablet 3     gabapentin (NEURONTIN) 300 MG capsule Take 1 capsule (300 mg) by mouth 3 times daily 90 capsule 5     hydrocortisone 2.5 % cream   2     hydrocortisone valerate (WEST-GISELE) 0.2 % ointment Apply sparingly to affected area three times daily as needed. 45 g 0     Ibuprofen-diphenhydrAMINE HCl (ADVIL PM) 200-25 MG CAPS Take 1 tablet by mouth nightly as needed.       ketoconazole (NIZORAL) 2 % external shampoo        levothyroxine (SYNTHROID/LEVOTHROID) 125 MCG tablet Take 125 mcg by mouth daily.       methotrexate 2.5 MG tablet Take 6 tablets (15 mg) by mouth every 7 days. Labs every 8 - 12 weeks for refills. 24 tablet 2     metoprolol succinate ER (TOPROL XL) 50 MG 24 hr tablet TAKE 2 TABLETS(100 MG) BY MOUTH DAILY 180 tablet 0     montelukast (SINGULAIR) 10 MG tablet Take 1 tablet (10 mg) by mouth at bedtime. 90 tablet 3     No current facility-administered medications for this visit.       PHYSICAL EXAM    BP (!) 143/83 (BP Location: Right arm, Patient Position: Sitting, Cuff Size: Adult Regular)   Pulse 110   Wt 57.6 kg (127 lb)   SpO2 96%   BMI 22.64 kg/m        General: Alert, No apparent distress   Psych: Affect euthymic  Eyes: Sclera non injected  Neuro: Normal gait and Able to arise from seated position unassisted  Musculoskeletal: Hands:  Normal. No synovitis.   Wrists:  Normal.  Elbows:  Normal.  Shoulders:  Normal.  Feet:  Normal.  Ankles:  minimal swelling + right and left  ankles  Knees:  Normal.    LABS   Reviewed as below.     July 2024  CBC normal   CMP normal creatinine and LFT    Feb 2024  QTB neg     2024  CCP neg   ACE level normal  FERMIN neg    Nov 2023  T4 normal   T3 low, TSH elevated at 4.52    2022  ARABELLA neg   RF neg     2021  HCV neg   HIV neg   Vit D normal   ESR, CRP normal      Latest Reference Range & Units 10/05/21 16:03 02/26/24 15:34   Hep B Surface Agn Nonreactive   Nonreactive   Hepatitis B Core Areli Nonreactive   Nonreactive   Hepatitis B Surface Antibody Instrument Value <8.5 m[IU]/mL  <3.50   Hepatitis B Surface Antibody   Nonreactive   Hepatitis C Antibody Nonreactive  Nonreactive    HIV Antigen Antibody Combo Nonreactive  Nonreactive        XR HAND RIGHT G/E 3 VIEWS 4/21/2023 2:07 PM     HISTORY: Nodule of finger of right hand, finger pain.     COMPARISON: None.                                                                      IMPRESSION: No fracture. Normal alignment. No erosive changes.    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                                                                       IMPRESSION: Since February 10, 2021, heart size is normal. No pleural  effusion, pneumothorax, or abnormal area of consolidation. Scoliotic  curvature of the thoracolumbar spine, apex left.     Narrative & Impression   RIGHT FOOT THREE OR MORE VIEWS  10/5/2021 3:44 PM      HISTORY: Foot swelling.     COMPARISON: None.                                                                      IMPRESSION: No evidence of acute fracture. Normal joint spacing and  alignment.     RIGHT ANKLE THREE OR MORE VIEWS  10/5/2021 3:43 PM      HISTORY: Right ankle and foot pain and swelling.     COMPARISON: None.                                                                      IMPRESSION: Normal joint spacing and alignment. No evidence of acute  fracture.  ASSESSMENT      1. Seronegative arthritis    2. Arthropathies in  other specified diseases classified elsewhere, multiple sites    3. Inflammatory arthritis          Inflammatory arthritis (primary encounter diagnosis)  Comment: RF,CCP neg.Pain and swelling of both ankles as well as the feet since 2021.  She states lately it has been the right ankle that is being more swollen but both ankles do swell up at the same time symptoms as well. X ray right hand and ankle normal. She does not have clinical features of lupus, had a neg ARABELLA in the past. Differentials : seroneg RA/SpA/reactive arthritis? MRI left ankle - minimal fluid in the left ankle joint. MRI right ankle - Low to moderate grade partial tear deep portion of deltoid ligament. Mild achilles peritendinitis.FSERO test came back pos for 14-3-3 ETA. Started on methotrexate in May 2024. Currently on methotrexate 15 mg weekly along with folic acid 1 mg daily. Continues to have swelling of ankles, improved from last time.   Plan:   Continue methotrexate 15 mg weekly.   Continue folic acid 1 mg daily.   Orders Placed This Encounter   Procedures     Comprehensive metabolic panel     CRP inflammation     CBC with platelets differential       (J44.9) Obstructive lung disease (H)  Comment: noted  Plan: ref to Pulm     (I73.00) Raynaud's disease without gangrene  Comment: no raynaud's on exam   Plan: work up as above     Long term use of high-risk medication  Comment : methotrexate  Plan : The adverse reactions of MTX was discussed which includes cytopenia, immunosuppression, infection, oversedation, pneumonitis, dizziness, nausea, stomach upset, risk of falls, seizures, and derangements in LFTs. Recommended against use of alcohol while on MTX. pt counseled on the adverse effects of Methotrexate including teratogenicity and need for reliable contraception, Alopecia, infection, liver dysfunction and myelosuppression, including the importance of taking daily Folic acid. Patient verbalized understanding and agrees to proceed.    RTC - 12  weeks.         LARISSA ARZOLA MD    Division of Rheumatic & Autoimmune Diseases  [facility]       Again, thank you for allowing me to participate in the care of your patient.      Sincerely,    Larissa Arzola MD

## 2024-11-13 DIAGNOSIS — M79.7 FIBROMYALGIA: ICD-10-CM

## 2024-11-13 RX ORDER — CYCLOBENZAPRINE HCL 10 MG
TABLET ORAL
Qty: 90 TABLET | Refills: 0 | Status: SHIPPED | OUTPATIENT
Start: 2024-11-13 | End: 2024-11-14

## 2024-11-14 DIAGNOSIS — M79.7 FIBROMYALGIA: ICD-10-CM

## 2024-11-14 RX ORDER — CYCLOBENZAPRINE HCL 10 MG
TABLET ORAL
Qty: 90 TABLET | Refills: 0 | Status: SHIPPED | OUTPATIENT
Start: 2024-11-14

## 2024-11-19 DIAGNOSIS — Z86.79 H/O SINUS TACHYCARDIA: ICD-10-CM

## 2024-11-19 RX ORDER — METOPROLOL SUCCINATE 50 MG/1
TABLET, EXTENDED RELEASE ORAL
Qty: 180 TABLET | Refills: 0 | OUTPATIENT
Start: 2024-11-19

## 2024-12-01 DIAGNOSIS — M13.80 SERONEGATIVE ARTHRITIS: ICD-10-CM

## 2024-12-06 ENCOUNTER — LAB (OUTPATIENT)
Dept: LAB | Facility: CLINIC | Age: 49
End: 2024-12-06
Payer: MEDICARE

## 2024-12-06 DIAGNOSIS — M14.89 ARTHROPATHIES IN OTHER SPECIFIED DISEASES CLASSIFIED ELSEWHERE, MULTIPLE SITES: ICD-10-CM

## 2024-12-06 DIAGNOSIS — E89.0 POSTOPERATIVE HYPOTHYROIDISM: ICD-10-CM

## 2024-12-06 LAB
ALBUMIN SERPL BCG-MCNC: 4.4 G/DL (ref 3.5–5.2)
ALP SERPL-CCNC: 72 U/L (ref 40–150)
ALT SERPL W P-5'-P-CCNC: 42 U/L (ref 0–50)
ANION GAP SERPL CALCULATED.3IONS-SCNC: 10 MMOL/L (ref 7–15)
AST SERPL W P-5'-P-CCNC: 51 U/L (ref 0–45)
BASOPHILS # BLD AUTO: 0.1 10E3/UL (ref 0–0.2)
BASOPHILS NFR BLD AUTO: 1 %
BILIRUB SERPL-MCNC: 0.4 MG/DL
BUN SERPL-MCNC: 16.8 MG/DL (ref 6–20)
CALCIUM SERPL-MCNC: 8 MG/DL (ref 8.8–10.4)
CHLORIDE SERPL-SCNC: 103 MMOL/L (ref 98–107)
CREAT SERPL-MCNC: 0.9 MG/DL (ref 0.51–0.95)
CRP SERPL-MCNC: 62.6 MG/L
EGFRCR SERPLBLD CKD-EPI 2021: 78 ML/MIN/1.73M2
EOSINOPHIL # BLD AUTO: 0.3 10E3/UL (ref 0–0.7)
EOSINOPHIL NFR BLD AUTO: 3 %
ERYTHROCYTE [DISTWIDTH] IN BLOOD BY AUTOMATED COUNT: 12.9 % (ref 10–15)
GLUCOSE SERPL-MCNC: 125 MG/DL (ref 70–99)
HCO3 SERPL-SCNC: 27 MMOL/L (ref 22–29)
HCT VFR BLD AUTO: 41.4 % (ref 35–47)
HGB BLD-MCNC: 13.4 G/DL (ref 11.7–15.7)
IMM GRANULOCYTES # BLD: 0 10E3/UL
IMM GRANULOCYTES NFR BLD: 0 %
LYMPHOCYTES # BLD AUTO: 2.4 10E3/UL (ref 0.8–5.3)
LYMPHOCYTES NFR BLD AUTO: 30 %
MCH RBC QN AUTO: 29.3 PG (ref 26.5–33)
MCHC RBC AUTO-ENTMCNC: 32.4 G/DL (ref 31.5–36.5)
MCV RBC AUTO: 91 FL (ref 78–100)
MONOCYTES # BLD AUTO: 0.6 10E3/UL (ref 0–1.3)
MONOCYTES NFR BLD AUTO: 8 %
NEUTROPHILS # BLD AUTO: 4.6 10E3/UL (ref 1.6–8.3)
NEUTROPHILS NFR BLD AUTO: 58 %
PLATELET # BLD AUTO: 202 10E3/UL (ref 150–450)
POTASSIUM SERPL-SCNC: 4.3 MMOL/L (ref 3.4–5.3)
PROT SERPL-MCNC: 7.2 G/DL (ref 6.4–8.3)
RBC # BLD AUTO: 4.57 10E6/UL (ref 3.8–5.2)
SODIUM SERPL-SCNC: 140 MMOL/L (ref 135–145)
TSH SERPL DL<=0.005 MIU/L-ACNC: 0.37 UIU/ML (ref 0.3–4.2)
WBC # BLD AUTO: 8 10E3/UL (ref 4–11)

## 2024-12-06 PROCEDURE — 85025 COMPLETE CBC W/AUTO DIFF WBC: CPT

## 2024-12-06 PROCEDURE — 86140 C-REACTIVE PROTEIN: CPT

## 2024-12-06 PROCEDURE — 80053 COMPREHEN METABOLIC PANEL: CPT

## 2024-12-06 PROCEDURE — 84443 ASSAY THYROID STIM HORMONE: CPT

## 2024-12-06 NOTE — TELEPHONE ENCOUNTER
METHOTREXATE 2.5MG TABLETS - YELLOW       Last Written Prescription Date:  10-9-24  Last Fill Quantity: 24,   # refills: 2  Last Office Visit: 11-11-24  Future Office visit:  2-17-25    CBC RESULTS:   Recent Labs   Lab Test 07/01/24  1335   WBC 7.5   RBC 4.51   HGB 13.4   HCT 41.1   MCV 91   MCH 29.7   MCHC 32.6   RDW 13.2          Creatinine   Date Value Ref Range Status   07/01/2024 0.95 0.51 - 0.95 mg/dL Final   02/26/2019 0.98 0.52 - 1.04 mg/dL Final   ]    Liver Function Studies -   Recent Labs   Lab Test 07/01/24  1335   PROTTOTAL 7.1   ALBUMIN 4.4   BILITOTAL 0.3   ALKPHOS 69   AST 30   ALT 18       Routing refill request to provider for review/approval because:  Past due labs-- FYI to clinic , lab appt 12-6-24

## 2024-12-07 ENCOUNTER — MYC MEDICAL ADVICE (OUTPATIENT)
Dept: FAMILY MEDICINE | Facility: CLINIC | Age: 49
End: 2024-12-07
Payer: MEDICARE

## 2024-12-09 DIAGNOSIS — M13.80 SERONEGATIVE ARTHRITIS: ICD-10-CM

## 2024-12-09 RX ORDER — METHOTREXATE 2.5 MG/1
12.5 TABLET ORAL
Qty: 60 TABLET | Refills: 0 | Status: SHIPPED | OUTPATIENT
Start: 2024-12-09

## 2024-12-09 NOTE — RESULT ENCOUNTER NOTE
"Hello Clemencia  Your attached TSH is back to normal.  Please contact the office with any questions or concerns.    Milena Allen \"Jc\" SWAPNIL Moreau    "

## 2024-12-09 NOTE — TELEPHONE ENCOUNTER
KENYA and sent [employer] message to patient to update on methotrexate dose change.    Karie Cohen RN

## 2024-12-10 ENCOUNTER — TELEPHONE (OUTPATIENT)
Dept: RHEUMATOLOGY | Facility: CLINIC | Age: 49
End: 2024-12-10
Payer: MEDICARE

## 2024-12-10 NOTE — TELEPHONE ENCOUNTER
----- Message from Larissa Espinoza sent at 12/10/2024  8:25 AM CST -----  Elevated CRP. Is she having an infection?  Also recommend reducing methotrexate to 12.5 mg per week given elevated LFT.

## 2024-12-10 NOTE — TELEPHONE ENCOUNTER
Called and LM with patient to discuss lab results and recommendations. Awaiting response.     Karie Cohen RN

## 2024-12-13 NOTE — TELEPHONE ENCOUNTER
methotrexate 2.5 MG tablet       Last Written Prescription Date:  12/9/24  Last Fill Quantity: 60,   # refills: 0  Last Office Visit : 11/11/24  Future Office visit:  2/17/25    Routing refill request to provider for review/approval because: Please see ongoing my chart message 12/9/24, Do you want to give 90 day supply?     To pharmacy: **Patient requests 90 days supply**       Hellen CLIFFORD RN  [facility] Flag Triage & Med Refill Team

## 2024-12-15 RX ORDER — METHOTREXATE 2.5 MG/1
TABLET ORAL
Qty: 60 TABLET | Refills: 0 | Status: SHIPPED | OUTPATIENT
Start: 2024-12-15

## 2025-01-08 ENCOUNTER — VIRTUAL VISIT (OUTPATIENT)
Dept: PHARMACY | Facility: CLINIC | Age: 50
End: 2025-01-08
Attending: INTERNAL MEDICINE
Payer: MEDICARE

## 2025-01-08 DIAGNOSIS — M13.80 SERONEGATIVE INFLAMMATORY ARTHRITIS: Primary | ICD-10-CM

## 2025-01-08 DIAGNOSIS — Z71.85 VACCINE COUNSELING: ICD-10-CM

## 2025-01-08 NOTE — PATIENT INSTRUCTIONS
"Recommendations from today's MTM visit:                                                      Writer will discuss either sulfasalazine or hydroxychloroquine as an alternative to methotrexate with Dr. Espinoza and update patient as appropriate.  Continue to consider the following vaccines: Shingrix.    Follow-up: Pending provider response re: alternative to methotrexate. Next rheumatologist visit is 2/17/25.    It was great speaking with you today.  I value your experience and would be very thankful for your time in providing feedback in our clinic survey. In the next few days, you may receive an email or text message from [employer] with a link to a survey related to your  clinical pharmacist.\"     To schedule another MTM appointment, please call the clinic directly or you may call the MTM scheduling line at [phone].    My Clinical Pharmacist's contact information:                                                      Please feel free to contact me with any questions or concerns you have.      Glory Quiroz, PharmD  Medication Therapy Management Pharmacist  [facility]    "

## 2025-01-08 NOTE — PROGRESS NOTES
"Medication Therapy Management (MTM) Encounter    ASSESSMENT:                            Medication Adherence/Access: No issues identified.    Seronegative inflammatory arthritis: Patient stopped taking methotrexate due to concerns about LFT elevations and frequent infection. She would like to start an alternative that is less likely to cause these side effects. Will discuss sulfasalazine or hydroxychloroquine with provider as potential alternatives. Did reiterate that immunosuppression is a primary mechanism for treating autoimmune arthritis.    Vaccine counseling: Indicated for Shingrix per ACIP recommendations for patients on immunosuppressing medications. No changes from last visit.    PLAN:                            Writer will discuss either sulfasalazine or hydroxychloroquine as an alternative to methotrexate with Dr. Espinoza and update patient as appropriate.  Continue to consider the following vaccines: Shingrix.    Follow-up: Pending provider response re: alternative to methotrexate. Next rheumatologist visit is 2/17/25.    SUBJECTIVE/OBJECTIVE:                          Clemencia Rosales is a 48 year old female called for a follow up visit from 11/21/24. She was referred to me from Larissa Espinoza MD.    Reason for visit: Methotrexate adherence/titration follow up.    Allergies/ADRs: Reviewed in chart  Past Medical History: Reviewed in chart  Tobacco: She reports that she has never smoked. She has never used smokeless tobacco.  Alcohol: does not drink    Medication Adherence/Access: No issues identified.    Seronegative inflammatory arthritis: Patient stopped the methotrexate just before Arely, she did not like that it increased her LFTs and she had been having frequent infections on it. \"Seems like she catches everything\" even if masked. Sees Dr. Espinoza on 2/17/25. She had not noticed much benefit from it anyway. She would \"like something gentler.\" She does feel that her joints are \"inflamed\" right now and \"is " "moving really slow.\" It's difficult for her to participate in activities right now and stand for long periods of time. She is a  so this is affecting her ability to work. She did find a pair of orthopedic shoes that make walking less painful, will also start wearing her knee brace again. Knees had been \"feeling fine\" for a while. Icing her ankles is also helpful.    Previously tried: methotrexate (LFT elevations, concern about side effects), NSAIDs    Component      Latest Ref Rng 12/6/2024  1:59 PM   WBC      4.0 - 11.0 10e3/uL 8.0    RBC Count      3.80 - 5.20 10e6/uL 4.57    Hemoglobin      11.7 - 15.7 g/dL 13.4    Hematocrit      35.0 - 47.0 % 41.4    MCV      78 - 100 fL 91    MCH      26.5 - 33.0 pg 29.3    MCHC      31.5 - 36.5 g/dL 32.4    RDW      10.0 - 15.0 % 12.9    Platelet Count      150 - 450 10e3/uL 202    % Neutrophils      % 58    % Lymphocytes      % 30    % Monocytes      % 8    % Eosinophils      % 3    % Basophils      % 1    % Immature Granulocytes      % 0    Absolute Neutrophils      1.6 - 8.3 10e3/uL 4.6    Absolute Lymphocytes      0.8 - 5.3 10e3/uL 2.4    Absolute Monocytes      0.0 - 1.3 10e3/uL 0.6    Absolute Eosinophils      0.0 - 0.7 10e3/uL 0.3    Absolute Basophils      0.0 - 0.2 10e3/uL 0.1    Absolute Immature Granulocytes      <=0.4 10e3/uL 0.0    Sodium      135 - 145 mmol/L 140    Potassium      3.4 - 5.3 mmol/L 4.3    Carbon Dioxide (CO2)      22 - 29 mmol/L 27    Anion Gap      7 - 15 mmol/L 10    Urea Nitrogen      6.0 - 20.0 mg/dL 16.8    Creatinine      0.51 - 0.95 mg/dL 0.90    GFR Estimate      >60 mL/min/1.73m2 78    Calcium      8.8 - 10.4 mg/dL 8.0 (L)    Chloride      98 - 107 mmol/L 103    Glucose      70 - 99 mg/dL 125 (H)    Alkaline Phosphatase      40 - 150 U/L 72    AST      0 - 45 U/L 51 (H)    ALT      0 - 50 U/L 42    Protein Total      6.4 - 8.3 g/dL 7.2    Albumin      3.5 - 5.2 g/dL 4.4    Bilirubin Total      <=1.2 mg/dL 0.4    CRP " Inflammation      <5.00 mg/L 62.60 (H)       Vaccine counseling: Open to receiving recommended vaccines.    Immunization History   Administered Date(s) Administered    COVID-19 12+ (Pfizer) 11/06/2023, 09/24/2024    COVID-19 Bivalent 18+ (Moderna) 11/11/2022    COVID-19 MONOVALENT 12+ (Pfizer) 05/04/2021, 05/25/2021    COVID-19 Monovalent 12+ (Pfizer 2022) 02/25/2022    Influenza (H1N1) 12/23/2009    Influenza (IIV3) PF 12/02/2005, 11/07/2007, 11/14/2008, 09/09/2009, 10/05/2010, 09/15/2011, 09/17/2012, 10/05/2014    Influenza (prior to 2024) 10/20/2015    Influenza Vaccine >6 months,quad, PF 10/18/2013, 09/20/2016, 10/10/2017, 11/02/2018, 10/05/2021, 11/28/2022, 11/17/2023    Influenza Vaccine, 6+MO IM (QUADRIVALENT W/PRESERVATIVES) 10/30/2015, 10/03/2019, 11/05/2020    Influenza, Split Virus, Trivalent, Pf (Fluzone\Fluarix) 09/24/2024    Pneumococcal 20 valent Conjugate (Prevnar 20) 01/23/2023    Pneumococcal 23 valent 03/19/2014    TDAP (Adacel,Boostrix) 04/21/2023    TDAP Vaccine (Adacel) 01/04/2012      Today's Vitals: none taken today  ----------------    I spent 18 minutes with this patient today. All changes were made via collaborative practice agreement with Larissa Espinoza MD. A copy of the visit note was provided to the patient's provider(s).    A summary of these recommendations was sent via [employer].    Glory Quiroz, PharmD  Medication Therapy Management Pharmacist  [facility]     Telemedicine Visit Details  Type of service: Telephone visit  Start Time: 1400  End Time: 1418     Medication Therapy Recommendations  Seronegative inflammatory arthritis   1 Current Medication: methotrexate 2.5 MG tablet (Discontinued)   Current Medication Sig: TAKE 5 TABLETS(12.5 MG) BY MOUTH EVERY 7 DAYS   Rationale: Patient prefers not to take - Adherence - Adherence   Recommendation: Change Medication - Patient requests alternative that is less likely to cause infections and LFT elevations   Status:  Contact Provider - Awaiting Response   Identified Date: 1/8/2025   Note: HCQ or SSZ?

## 2025-01-08 NOTE — Clinical Note
Called patient to follow up on methotrexate, we had dropped her dose to 12.5 mg weekly due to LFT elevations. She has stopped it due to concerns about the elevations and recurrent infections, and would like an alternative that is less likely to cause those side effects. We did discuss that immunosuppression is a primary mechanism of controlling autoimmune arthritis. Wanted to get your thoughts on SSZ or HCQ? You see her 2/17. Thanks!

## 2025-01-14 ENCOUNTER — MYC MEDICAL ADVICE (OUTPATIENT)
Dept: PHARMACY | Facility: CLINIC | Age: 50
End: 2025-01-14
Payer: MEDICARE

## 2025-01-14 DIAGNOSIS — M13.80 SERONEGATIVE INFLAMMATORY ARTHRITIS: Primary | ICD-10-CM

## 2025-01-23 ENCOUNTER — OFFICE VISIT (OUTPATIENT)
Dept: PULMONOLOGY | Facility: CLINIC | Age: 50
End: 2025-01-23
Attending: INTERNAL MEDICINE
Payer: MEDICARE

## 2025-01-23 ENCOUNTER — ANCILLARY PROCEDURE (OUTPATIENT)
Dept: GENERAL RADIOLOGY | Facility: CLINIC | Age: 50
End: 2025-01-23
Attending: INTERNAL MEDICINE
Payer: MEDICARE

## 2025-01-23 VITALS
SYSTOLIC BLOOD PRESSURE: 125 MMHG | BODY MASS INDEX: 21.83 KG/M2 | OXYGEN SATURATION: 99 % | WEIGHT: 127.9 LBS | DIASTOLIC BLOOD PRESSURE: 85 MMHG | HEART RATE: 112 BPM | HEIGHT: 64 IN

## 2025-01-23 DIAGNOSIS — J44.81 OBLITERATIVE BRONCHIOLITIS (H): ICD-10-CM

## 2025-01-23 DIAGNOSIS — J44.81 BRONCHIOLITIS OBLITERANS (H): Primary | ICD-10-CM

## 2025-01-23 DIAGNOSIS — J44.81 BRONCHIOLITIS OBLITERANS (H): ICD-10-CM

## 2025-01-23 LAB
ATRIAL RATE - MUSE: 88 BPM
DIASTOLIC BLOOD PRESSURE - MUSE: NORMAL MMHG
DLCOUNC-%PRED-PRE: 59 %
DLCOUNC-PRE: 12.38 ML/MIN/MMHG
DLCOUNC-PRED: 20.78 ML/MIN/MMHG
ERV-%PRED-PRE: 17 %
ERV-PRE: 0.21 L
ERV-PRED: 1.18 L
EXPTIME-PRE: 5.56 SEC
FEF2575-%PRED-PRE: 17 %
FEF2575-PRE: 0.46 L/SEC
FEF2575-PRED: 2.67 L/SEC
FEFMAX-%PRED-PRE: 41 %
FEFMAX-PRE: 2.83 L/SEC
FEFMAX-PRED: 6.79 L/SEC
FEV1-%PRED-PRE: 33 %
FEV1-PRE: 0.87 L
FEV1FEV6-PRE: 64 %
FEV1FEV6-PRED: 83 %
FEV1FVC-PRE: 64 %
FEV1FVC-PRED: 82 %
FEV1SVC-PRE: 67 %
FEV1SVC-PRED: 77 %
FIFMAX-PRE: 2.32 L/SEC
FRCPLETH-%PRED-PRE: 99 %
FRCPLETH-PRE: 2.81 L
FRCPLETH-PRED: 2.82 L
FVC-%PRED-PRE: 41 %
FVC-PRE: 1.35 L
FVC-PRED: 3.22 L
IC-%PRED-PRE: 46 %
IC-PRE: 1.1 L
IC-PRED: 2.36 L
INTERPRETATION ECG - MUSE: NORMAL
P AXIS - MUSE: 69 DEGREES
PR INTERVAL - MUSE: 110 MS
QRS DURATION - MUSE: 78 MS
QT - MUSE: 362 MS
QTC - MUSE: 438 MS
R AXIS - MUSE: 55 DEGREES
RVPLETH-%PRED-PRE: 151 %
RVPLETH-PRE: 2.6 L
RVPLETH-PRED: 1.71 L
SYSTOLIC BLOOD PRESSURE - MUSE: NORMAL MMHG
T AXIS - MUSE: 37 DEGREES
TLCPLETH-%PRED-PRE: 79 %
TLCPLETH-PRE: 3.9 L
TLCPLETH-PRED: 4.94 L
VA-%PRED-PRE: 45 %
VA-PRE: 2.18 L
VC-%PRED-PRE: 38 %
VC-PRE: 1.31 L
VC-PRED: 3.41 L
VENTRICULAR RATE- MUSE: 88 BPM

## 2025-01-23 PROCEDURE — G0463 HOSPITAL OUTPT CLINIC VISIT: HCPCS | Performed by: INTERNAL MEDICINE

## 2025-01-23 RX ORDER — PREDNISONE 20 MG/1
20 TABLET ORAL DAILY
Qty: 7 TABLET | Refills: 0 | Status: SHIPPED | OUTPATIENT
Start: 2025-01-23

## 2025-01-23 ASSESSMENT — PAIN SCALES - GENERAL: PAINLEVEL_OUTOF10: MILD PAIN (2)

## 2025-01-23 NOTE — PROGRESS NOTES
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  She was diagnosed by [facility] lung biopsy in 2007.  She her history is that she had a severe viral pneumonia at age 15 at which time she was intubated in the ICU.  She entered the  at age 19, and she was started on oxygen for low oxygen levels of 84 to 85% while she was in the .  She used oxygen for 4 years.  She was later diagnosed with Graves' disease and had radioactive iodine treatment.  She was followed in our clinic and also at [facility] in the past.  There was a gap of 10 years during which she did not see a pulmonologist until she reestablished care in 2024.  She was diagnosed with seronegative rheumatoid arthritis in 2024.  For her obliterative bronchiolitis, I started daily azithromycin and montelukast in 9/2024.  Her insurance would not pay for Symbicort.    Today, Clemencia reports no problems or side effects with azithromycin and montelukast.  She continues to use albuterol as needed.  Her dyspnea on exertion remains unchanged, for example she feels short of breath when she walks up a flight of stairs.  She loves being outside, and she will walk outdoors for up to 2 hours, although with frequent stops.  She continues on the walk even though she does experience dyspnea on exertion.    Clemencia reports that she had a URI at the beginning of this month with fever, myalgias, rhinitis and sore throat.  Her home COVID test was negative.  Since then, she has had increasing cough and increasing dyspnea on exertion.  She is coughing up light yellow sputum.  She stopped methotrexate because she had COVID in October and then a URI in January, and she did not want to be immunosuppressed.    Clemencia's activity is limited by joint  pains, especially in her ankle, feet, knees and hips.  She also endorses pain in her right side/back since her URI at the beginning of January.  She is wondering if it is due to her coughing so much.            Current Outpatient Medications   Medication Sig Dispense Refill    albuterol (PROAIR HFA/PROVENTIL HFA/VENTOLIN HFA) 108 (90 Base) MCG/ACT inhaler INHALE 1 TO 2 PUFFS INTO THE LUNGS EVERY 4 HOURS AS NEEDED FOR SHORTNESS OF BREATH OR DIFFICULT BREATHING OR WHEEZING 18 g 0    aspirin 325 MG tablet Take  by mouth daily. Takes 2 tablets as needed      azithromycin (ZITHROMAX) 250 MG tablet Take 1 tablet (250 mg) by mouth daily. 90 tablet 3    BENADRYL OR AS NEEDED      benzonatate (TESSALON) 100 MG capsule Take 1-2 capsules (100-200 mg) by mouth 3 times daily as needed for cough 30 capsule 0    Cholecalciferol (VITAMIN D PO)       cyclobenzaprine (FLEXERIL) 10 MG tablet TAKE 1/2 TO 1 TABLET(5 TO 10 MG) BY MOUTH THREE TIMES DAILY AS NEEDED FOR MUSCLE SPASMS 90 tablet 0    fluocinonide (LIDEX) 0.05 % external solution       fluticasone (FLONASE) 50 MCG/ACT nasal spray Spray 1 spray into both nostrils daily 16 mL 11    gabapentin (NEURONTIN) 300 MG capsule Take 1 capsule (300 mg) by mouth 3 times daily 90 capsule 5    hydrocortisone 2.5 % cream   2    hydrocortisone valerate (WEST-GISELE) 0.2 % ointment Apply sparingly to affected area three times daily as needed. 45 g 0    Ibuprofen-diphenhydrAMINE HCl (ADVIL PM) 200-25 MG CAPS Take 1 tablet by mouth nightly as needed.      ketoconazole (NIZORAL) 2 % external shampoo       levothyroxine (SYNTHROID/LEVOTHROID) 125 MCG tablet Take 125 mcg by mouth daily.      metoprolol succinate ER (TOPROL XL) 50 MG 24 hr tablet TAKE 2 TABLETS(100 MG) BY MOUTH DAILY 180 tablet 0    montelukast (SINGULAIR) 10 MG tablet Take 1 tablet (10 mg) by mouth at bedtime. 90 tablet 3    predniSONE (DELTASONE) 20 MG tablet Take 1 tablet (20 mg) by mouth daily. 7 tablet 0    fluticasone-vilanterol  "(BREO ELLIPTA) 100-25 MCG/ACT inhaler Inhale 1 puff into the lungs daily. (Patient not taking: Reported on 1/23/2025) 60 each 3     No current facility-administered medications for this visit.     Allergies   Allergen Reactions    Sulfa Antibiotics Other (See Comments)     Never taken medication however \"was told to put it down just in case\"     Past Medical History:   Diagnosis Date    Arrhythmia     Arthritis     COPD (chronic obstructive pulmonary disease) (H)     Not COPD, rare lung disease Obliterans Bronchiolitis    Fibromyalgia 2008    [place]    Graves disease     radioactive iodine    Head pain 01/16/2015    MEDICAL HISTORY OF -     20 ativan per month (menstral period/BOOP)    obliterative bronchiolitis     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    Pneumonia     viral pneumonia-age 16yo, was intubated in the ICU    RA (rheumatoid arthritis) (H)     seronegative RA dx 2024    Sinus tachycardia     Temporary low platelet count 06/12/2014    Thyrotoxicosis without mention of goiter or other cause, without mention of thyrotoxic crisis or storm 2002    Treated with irradiation-on synthroid       Past Surgical History:   Procedure Laterality Date    BIOPSY  2007    lung biopsy    THORACOSCOPIC BIOPSY RIB      x4    thyroid radiation      secondary to graves       Social History     Socioeconomic History    Marital status: Single     Spouse name: Not on file    Number of children: Not on file    Years of education: Not on file    Highest education level: Not on file   Occupational History    Not on file   Tobacco Use    Smoking status: Never    Smokeless tobacco: Never   Vaping Use    Vaping status: Never Used   Substance and Sexual Activity    Alcohol use: No    Drug use: No    Sexual activity: Not Currently   Other Topics " "Concern    Parent/sibling w/ CABG, MI or angioplasty before 65F 55M? No     Service Not Asked    Blood Transfusions Not Asked    Caffeine Concern No     Comment: occasional small size coke     Occupational Exposure Not Asked    Hobby Hazards Not Asked    Sleep Concern Not Asked    Stress Concern Not Asked    Weight Concern Not Asked    Special Diet Not Asked    Back Care Not Asked    Exercise Yes     Comment: rehab    Bike Helmet Not Asked    Seat Belt Yes    Self-Exams Not Asked   Social History Narrative    Not on file     Social Drivers of Health     Financial Resource Strain: Not on file   Food Insecurity: Not on file   Transportation Needs: Not on file   Physical Activity: Not on file   Stress: Not on file   Social Connections: Not on file   Interpersonal Safety: Low Risk  (4/18/2024)    Interpersonal Safety     Do you feel physically and emotionally safe where you currently live?: Yes     Within the past 12 months, have you been hit, slapped, kicked or otherwise physically hurt by someone?: No     Within the past 12 months, have you been humiliated or emotionally abused in other ways by your partner or ex-partner?: No   Housing Stability: Not on file       Family History   Problem Relation Age of Onset    Diabetes Father     Other Cancer Maternal Grandfather         lung cancer    Diabetes Paternal Aunt     Diabetes Paternal Uncle     Asthma Other     Thyroid Disease Other     Asthma Other     Asthma Paternal Uncle            Vitals: /85 (BP Location: Left arm, Patient Position: Chair, Cuff Size: Adult Regular)   Pulse (!) 112   Ht 1.626 m (5' 4\")   Wt 58 kg (127 lb 14.4 oz)   SpO2 99%   BMI 21.95 kg/m      Exam:   GENERAL APPEARANCE: Well developed, well nourished, alert, and in no apparent distress.  RESP:  +Decreased breath sounds in the right mid and lower lung zones.  No crackles. No rhonchi. No wheezes.  No inspiratory squeaks.  CV: Normal S1, S2, regular rhythm, normal rate. No " murmur.  No LE edema.   MS: extremities normal. No clubbing. No cyanosis.  SKIN: no rash on limited exam.  NEURO: Mentation intact, speech normal.  PSYCH: mentation appears normal and affect normal/bright.      Results:  Recent Results (from the past week)   General PFT Lab (Please always keep checked)    Collection Time: 01/23/25  8:54 AM   Result Value Ref Range    FVC-Pred 3.22 L    FVC-Pre 1.35 L    FVC-%Pred-Pre 41 %    FEV1-Pre 0.87 L    FEV1-%Pred-Pre 33 %    FEV1FVC-Pred 82 %    FEV1FVC-Pre 64 %    FEFMax-Pred 6.79 L/sec    FEFMax-Pre 2.83 L/sec    FEFMax-%Pred-Pre 41 %    FEF2575-Pred 2.67 L/sec    FEF2575-Pre 0.46 L/sec    VXO2358-%Pred-Pre 17 %    ExpTime-Pre 5.56 sec    FIFMax-Pre 2.32 L/sec    VC-Pred 3.41 L    VC-Pre 1.31 L    VC-%Pred-Pre 38 %    IC-Pred 2.36 L    IC-Pre 1.10 L    IC-%Pred-Pre 46 %    ERV-Pred 1.18 L    ERV-Pre 0.21 L    ERV-%Pred-Pre 17 %    FEV1FEV6-Pred 83 %    FEV1FEV6-Pre 64 %    FRCPleth-Pred 2.82 L    FRCPleth-Pre 2.81 L    FRCPleth-%Pred-Pre 99 %    RVPleth-Pred 1.71 L    RVPleth-Pre 2.60 L    RVPleth-%Pred-Pre 151 %    TLCPleth-Pred 4.94 L    TLCPleth-Pre 3.90 L    TLCPleth-%Pred-Pre 79 %    DLCOunc-Pred 20.78 ml/min/mmHg    DLCOunc-Pre 12.38 ml/min/mmHg    DLCOunc-%Pred-Pre 59 %    VA-Pre 2.18 L    VA-%Pred-Pre 45 %    FEV1SVC-Pred 77 %    FEV1SVC-Pre 67 %   EKG 12-lead, complete    Collection Time: 01/23/25 10:51 AM   Result Value Ref Range    Systolic Blood Pressure  mmHg    Diastolic Blood Pressure  mmHg    Ventricular Rate 88 BPM    Atrial Rate 88 BPM    OH Interval 110 ms    QRS Duration 78 ms     ms    QTc 438 ms    P Axis 69 degrees    R AXIS 55 degrees    T Axis 37 degrees    Interpretation ECG       Sinus rhythm with short OH  Otherwise normal ECG  When compared with ECG of 20-Sep-2024 12:12,  No significant change was found         I reviewed pulmonary function test that was performed today.  This shows mixed severe airflow obstruction with mild restriction  and a moderate diffusion defect.  Diffusion is down, but spirometry and lung volumes are stable.    I sent patient for chest x-ray today because of the decreased breath sounds in the right lung.  By my review, this looks unchanged compared to April 2024.  There is no evidence of pleural pleural effusion or pneumothorax.    I reviewed results with the patient.          Assessment and plan:  Clemencia is a 49-year-old with severe obliterative bronchiolitis who is here for follow-up.    1.  Severe obliterative bronchiolitis.  Dyspnea on exertion remains unchanged, but cough is increased since her URI at the beginning of the month.  I did get a chest x-ray today, which shows no evidence of pleural effusion or pneumothorax.  PFT shows stable spirometry and lung volumes, although diffusion is down compared to September.  I will repeat PFT at her next visit.  I will continue azithromycin 250 mg daily and montelukast 10 mg daily.  She also will continue albuterol inhaler as needed.  We will hold off on rituximab infusion as her spirometry is stable, and she is getting over 2 infections this fall and winter.  Rituximab could be an option in the future.  We also talked a little bit about lung transplant today, but she is not on supplemental oxygen, so she does not want to pursue it at this time.  I encouraged her to continue exercise.  She will return in 3 to 4 months with full PFT.  2.  Medication monitoring.  I got an EKG today, and my review is that it looks normal.  This is to monitor azithromycin.  3.  Postinfectious cough.  I will treat with prednisone 20 mg daily for 1 week.  I also recommended that she start zinc right away when she develops symptoms of a URI.    The longitudinal plan of care for the diagnosis(es)/condition(s) as documented were addressed during this visit. Due to the added complexity in care, I will continue to support Clemencia in the subsequent management and with ongoing continuity of care.    I spent  49 minutes reviewing chart, talking with and examining patient, reviewing test results, formulating plan and documentation on the day of the encounter (excluding PFT review).

## 2025-01-23 NOTE — NURSING NOTE
Chief Complaint   Patient presents with    Follow Up     Return ILD - 3 mo       Vitals were taken, medications reconciled.    Sienna Bhagat, Clinic Assistant   10:15 AM

## 2025-01-23 NOTE — LETTER
1/23/2025      Clemencia Rosales  [address]  [facility] [address]      Dear Colleague,    Thank you for referring your patient, Clemencia Rosales, to the [facility] [place]. Please see a copy of my visit note below.    [facility]    Reason for Visit  Clemencia Rosales is a 49 year old year old female who is being seen for Follow Up (Return ILD - 3 mo)    KIMBERLY Khanna is a 49-year-old with severe obliterative bronchiolitis who is here for follow-up.  She was diagnosed by [facility] lung biopsy in 2007.  She her history is that she had a severe viral pneumonia at age 15 at which time she was intubated in the ICU.  She entered the  at age 19, and she was started on oxygen for low oxygen levels of 84 to 85% while she was in the .  She used oxygen for 4 years.  She was later diagnosed with Graves' disease and had radioactive iodine treatment.  She was followed in our clinic and also at [facility] in the past.  There was a gap of 10 years during which she did not see a pulmonologist until she reestablished care in 2024.  She was diagnosed with seronegative rheumatoid arthritis in 2024.  For her obliterative bronchiolitis, I started daily azithromycin and montelukast in 9/2024.  Her insurance would not pay for Symbicort.    Today, Clemencia reports no problems or side effects with azithromycin and montelukast.  She continues to use albuterol as needed.  Her dyspnea on exertion remains unchanged, for example she feels short of breath when she walks up a flight of stairs.  She loves being outside, and she will walk outdoors for up to 2 hours, although with frequent stops.  She continues on the walk even though she does experience dyspnea on exertion.    Clemencia reports that she had a URI at the beginning of this month with fever, myalgias, rhinitis and sore throat.  Her home COVID test was negative.  Since  then, she has had increasing cough and increasing dyspnea on exertion.  She is coughing up light yellow sputum.  She stopped methotrexate because she had COVID in October and then a URI in January, and she did not want to be immunosuppressed.    Clemencia's activity is limited by joint pains, especially in her ankle, feet, knees and hips.  She also endorses pain in her right side/back since her URI at the beginning of January.  She is wondering if it is due to her coughing so much.            Current Outpatient Medications   Medication Sig Dispense Refill     albuterol (PROAIR HFA/PROVENTIL HFA/VENTOLIN HFA) 108 (90 Base) MCG/ACT inhaler INHALE 1 TO 2 PUFFS INTO THE LUNGS EVERY 4 HOURS AS NEEDED FOR SHORTNESS OF BREATH OR DIFFICULT BREATHING OR WHEEZING 18 g 0     aspirin 325 MG tablet Take  by mouth daily. Takes 2 tablets as needed       azithromycin (ZITHROMAX) 250 MG tablet Take 1 tablet (250 mg) by mouth daily. 90 tablet 3     BENADRYL OR AS NEEDED       benzonatate (TESSALON) 100 MG capsule Take 1-2 capsules (100-200 mg) by mouth 3 times daily as needed for cough 30 capsule 0     Cholecalciferol (VITAMIN D PO)        cyclobenzaprine (FLEXERIL) 10 MG tablet TAKE 1/2 TO 1 TABLET(5 TO 10 MG) BY MOUTH THREE TIMES DAILY AS NEEDED FOR MUSCLE SPASMS 90 tablet 0     fluocinonide (LIDEX) 0.05 % external solution        fluticasone (FLONASE) 50 MCG/ACT nasal spray Spray 1 spray into both nostrils daily 16 mL 11     gabapentin (NEURONTIN) 300 MG capsule Take 1 capsule (300 mg) by mouth 3 times daily 90 capsule 5     hydrocortisone 2.5 % cream   2     hydrocortisone valerate (WEST-GISELE) 0.2 % ointment Apply sparingly to affected area three times daily as needed. 45 g 0     Ibuprofen-diphenhydrAMINE HCl (ADVIL PM) 200-25 MG CAPS Take 1 tablet by mouth nightly as needed.       ketoconazole (NIZORAL) 2 % external shampoo        levothyroxine (SYNTHROID/LEVOTHROID) 125 MCG tablet Take 125 mcg by mouth daily.       metoprolol  "succinate ER (TOPROL XL) 50 MG 24 hr tablet TAKE 2 TABLETS(100 MG) BY MOUTH DAILY 180 tablet 0     montelukast (SINGULAIR) 10 MG tablet Take 1 tablet (10 mg) by mouth at bedtime. 90 tablet 3     predniSONE (DELTASONE) 20 MG tablet Take 1 tablet (20 mg) by mouth daily. 7 tablet 0     fluticasone-vilanterol (BREO ELLIPTA) 100-25 MCG/ACT inhaler Inhale 1 puff into the lungs daily. (Patient not taking: Reported on 1/23/2025) 60 each 3     No current facility-administered medications for this visit.     Allergies   Allergen Reactions     Sulfa Antibiotics Other (See Comments)     Never taken medication however \"was told to put it down just in case\"     Past Medical History:   Diagnosis Date     Arrhythmia      Arthritis      COPD (chronic obstructive pulmonary disease) (H)     Not COPD, rare lung disease Obliterans Bronchiolitis     Fibromyalgia 2008    [facility]     Graves disease     radioactive iodine     Head pain 01/16/2015     MEDICAL HISTORY OF -     20 ativan per month (menstral period/BOOP)     obliterative bronchiolitis     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 Sanches at [facility]; lung bx 2007 bronchiolitis and bronchiectasis; had severe viral pneumonia age 15     Other congenital anomaly of lung     restrictive/obstructive disease,  30% lung capapcity     Pneumonia     viral pneumonia-age 14yo, was intubated in the ICU     RA (rheumatoid arthritis) (H)     seronegative RA dx 2024     Sinus tachycardia      Temporary low platelet count 06/12/2014     Thyrotoxicosis without mention of goiter or other cause, without mention of thyrotoxic crisis or storm 2002    Treated with irradiation-on synthroid       Past Surgical History:   Procedure Laterality Date     BIOPSY  2007    lung biopsy     THORACOSCOPIC BIOPSY RIB      x4     thyroid radiation      secondary to graves       Social History     Socioeconomic History     Marital status: Single     Spouse name: Not on file     Number of children: Not on file     Years of " education: Not on file     Highest education level: Not on file   Occupational History     Not on file   Tobacco Use     Smoking status: Never     Smokeless tobacco: Never   Vaping Use     Vaping status: Never Used   Substance and Sexual Activity     Alcohol use: No     Drug use: No     Sexual activity: Not Currently   Other Topics Concern     Parent/sibling w/ CABG, MI or angioplasty before 65F 55M? No      Service Not Asked     Blood Transfusions Not Asked     Caffeine Concern No     Comment: occasional small size coke      Occupational Exposure Not Asked     Hobby Hazards Not Asked     Sleep Concern Not Asked     Stress Concern Not Asked     Weight Concern Not Asked     Special Diet Not Asked     Back Care Not Asked     Exercise Yes     Comment: rehab     Bike Helmet Not Asked     Seat Belt Yes     Self-Exams Not Asked   Social History Narrative     Not on file     Social Drivers of Health     Financial Resource Strain: Not on file   Food Insecurity: Not on file   Transportation Needs: Not on file   Physical Activity: Not on file   Stress: Not on file   Social Connections: Not on file   Interpersonal Safety: Low Risk  (4/18/2024)    Interpersonal Safety      Do you feel physically and emotionally safe where you currently live?: Yes      Within the past 12 months, have you been hit, slapped, kicked or otherwise physically hurt by someone?: No      Within the past 12 months, have you been humiliated or emotionally abused in other ways by your partner or ex-partner?: No   Housing Stability: Not on file       Family History   Problem Relation Age of Onset     Diabetes Father      Other Cancer Maternal Grandfather         lung cancer     Diabetes Paternal Aunt      Diabetes Paternal Uncle      Asthma Other      Thyroid Disease Other      Asthma Other      Asthma Paternal Uncle            Vitals: /85 (BP Location: Left arm, Patient Position: Chair, Cuff Size: Adult Regular)   Pulse (!) 112   Ht 1.626 m  "(5' 4\")   Wt 58 kg (127 lb 14.4 oz)   SpO2 99%   BMI 21.95 kg/m      Exam:   GENERAL APPEARANCE: Well developed, well nourished, alert, and in no apparent distress.  RESP:  +Decreased breath sounds in the right mid and lower lung zones.  No crackles. No rhonchi. No wheezes.  No inspiratory squeaks.  CV: Normal S1, S2, regular rhythm, normal rate. No murmur.  No LE edema.   MS: extremities normal. No clubbing. No cyanosis.  SKIN: no rash on limited exam.  NEURO: Mentation intact, speech normal.  PSYCH: mentation appears normal and affect normal/bright.      Results:  Recent Results (from the past week)   General PFT Lab (Please always keep checked)    Collection Time: 01/23/25  8:54 AM   Result Value Ref Range    FVC-Pred 3.22 L    FVC-Pre 1.35 L    FVC-%Pred-Pre 41 %    FEV1-Pre 0.87 L    FEV1-%Pred-Pre 33 %    FEV1FVC-Pred 82 %    FEV1FVC-Pre 64 %    FEFMax-Pred 6.79 L/sec    FEFMax-Pre 2.83 L/sec    FEFMax-%Pred-Pre 41 %    FEF2575-Pred 2.67 L/sec    FEF2575-Pre 0.46 L/sec    FCQ0704-%Pred-Pre 17 %    ExpTime-Pre 5.56 sec    FIFMax-Pre 2.32 L/sec    VC-Pred 3.41 L    VC-Pre 1.31 L    VC-%Pred-Pre 38 %    IC-Pred 2.36 L    IC-Pre 1.10 L    IC-%Pred-Pre 46 %    ERV-Pred 1.18 L    ERV-Pre 0.21 L    ERV-%Pred-Pre 17 %    FEV1FEV6-Pred 83 %    FEV1FEV6-Pre 64 %    FRCPleth-Pred 2.82 L    FRCPleth-Pre 2.81 L    FRCPleth-%Pred-Pre 99 %    RVPleth-Pred 1.71 L    RVPleth-Pre 2.60 L    RVPleth-%Pred-Pre 151 %    TLCPleth-Pred 4.94 L    TLCPleth-Pre 3.90 L    TLCPleth-%Pred-Pre 79 %    DLCOunc-Pred 20.78 ml/min/mmHg    DLCOunc-Pre 12.38 ml/min/mmHg    DLCOunc-%Pred-Pre 59 %    VA-Pre 2.18 L    VA-%Pred-Pre 45 %    FEV1SVC-Pred 77 %    FEV1SVC-Pre 67 %   EKG 12-lead, complete    Collection Time: 01/23/25 10:51 AM   Result Value Ref Range    Systolic Blood Pressure  mmHg    Diastolic Blood Pressure  mmHg    Ventricular Rate 88 BPM    Atrial Rate 88 BPM    NY Interval 110 ms    QRS Duration 78 ms     ms    QTc 438 ms "    P Axis 69 degrees    R AXIS 55 degrees    T Axis 37 degrees    Interpretation ECG       Sinus rhythm with short ID  Otherwise normal ECG  When compared with ECG of 20-Sep-2024 12:12,  No significant change was found         I reviewed pulmonary function test that was performed today.  This shows mixed severe airflow obstruction with mild restriction and a moderate diffusion defect.  Diffusion is down, but spirometry and lung volumes are stable.    I sent patient for chest x-ray today because of the decreased breath sounds in the right lung.  By my review, this looks unchanged compared to April 2024.  There is no evidence of pleural pleural effusion or pneumothorax.    I reviewed results with the patient.          Assessment and plan:  Clemencia is a 49-year-old with severe obliterative bronchiolitis who is here for follow-up.    1.  Severe obliterative bronchiolitis.  Dyspnea on exertion remains unchanged, but cough is increased since her URI at the beginning of the month.  I did get a chest x-ray today, which shows no evidence of pleural effusion or pneumothorax.  PFT shows stable spirometry and lung volumes, although diffusion is down compared to September.  I will repeat PFT at her next visit.  I will continue azithromycin 250 mg daily and montelukast 10 mg daily.  She also will continue albuterol inhaler as needed.  We will hold off on rituximab infusion as her spirometry is stable, and she is getting over 2 infections this fall and winter.  Rituximab could be an option in the future.  We also talked a little bit about lung transplant today, but she is not on supplemental oxygen, so she does not want to pursue it at this time.  I encouraged her to continue exercise.  She will return in 3 to 4 months with full PFT.  2.  Medication monitoring.  I got an EKG today, and my review is that it looks normal.  This is to monitor azithromycin.  3.  Postinfectious cough.  I will treat with prednisone 20 mg daily for 1  week.  I also recommended that she start zinc right away when she develops symptoms of a URI.    The longitudinal plan of care for the diagnosis(es)/condition(s) as documented were addressed during this visit. Due to the added complexity in care, I will continue to support Clemencia in the subsequent management and with ongoing continuity of care.    I spent 49 minutes reviewing chart, talking with and examining patient, reviewing test results, formulating plan and documentation on the day of the encounter (excluding PFT review).               Again, thank you for allowing me to participate in the care of your patient.        Sincerely,        Elayne Appiah MD    Electronically signed

## 2025-01-23 NOTE — PROGRESS NOTES
Clemencia Rosales comes into clinic today at the request of Dr. Elayne Appiah Ordering Provider for PFT      Chris Burden, RRT  
no

## 2025-01-29 ENCOUNTER — VIRTUAL VISIT (OUTPATIENT)
Dept: PHARMACY | Facility: CLINIC | Age: 50
End: 2025-01-29
Attending: INTERNAL MEDICINE
Payer: MEDICARE

## 2025-01-29 DIAGNOSIS — M13.80 SERONEGATIVE INFLAMMATORY ARTHRITIS: Primary | ICD-10-CM

## 2025-01-29 DIAGNOSIS — Z71.85 VACCINE COUNSELING: ICD-10-CM

## 2025-01-29 NOTE — PROGRESS NOTES
"Medication Therapy Management (MTM) Encounter    ASSESSMENT:                            Medication Adherence/Access: No issues identified.    Seronegative inflammatory arthritis: Patient stopped taking methotrexate due to concerns about LFT elevations and frequent infections. Discussed avoiding sulfasalazine due to sulfa allergy, another option is leflunomide. Would benefit from starting with lower 10 mg dose given her concerns about side effects and rechecking labs in 4 weeks.    Vaccine counseling: Indicated for Shingrix per ACIP recommendations for patients on immunosuppressing medications. No changes from last visit.    PLAN:                            Start leflunomide 10 mg daily. We will recheck labs in 4 weeks. If the medication is not sufficiently controlling symptoms after 12 weeks and you are tolerating it, we can consider increasing to 20 mg daily.  Continue to consider the following vaccines: Shingrix.    Follow-up: 4 weeks for lab recheck and tolerability, then 3 months. Sees rheumatologist 2/17/25.    SUBJECTIVE/OBJECTIVE:                          Clemencia Rosales is a 48 year old female called for a follow up visit from 11/21/24. She was referred to me from Larissa Espinoza MD.    Reason for visit: Leflunomide start.    Allergies/ADRs: Reviewed in chart  Past Medical History: Reviewed in chart  Tobacco: She reports that she has never smoked. She has never used smokeless tobacco.  Alcohol: does not drink    Medication Adherence/Access: No issues identified.    Seronegative inflammatory arthritis/bronchiolitis:   Albuterol 108 mcg/act HFA as needed  Azithromycin 250 mg daily  Montelukast 10 mg daily    Patient stopped the methotrexate just before Arely, she did not like that it increased her LFTs and she had been having frequent infections on it. \"Seems like she catches everything\" even if masked. She had not noticed much benefit from it anyway and would \"like something gentler.\" Endorses relatively " stable shortness of breath due to bronchiolitis for which she just completed a 7-day prednisone course. Has discussed rituximab infusions with pulmonology but avoiding for now due to frequent respiratory infections. Continues to feel that her joints are inflamed and is having difficulty participating in activities as a result. Affected joints include ankles, knees, and hips. Symptoms are consistent, do not flare and resolve. She is open to leflunomide but would like to start with the lower 10 mg dose and recheck labs in 4 weeks. She remains concerned about infection risk and wants to monitor this closely.    Previously tried: methotrexate (LFT elevations, concern about side effects), NSAIDs, note sulfa allergy    Vaccine counseling: Open to receiving recommended vaccines.    Immunization History   Administered Date(s) Administered    COVID-19 12+ (Pfizer) 11/06/2023, 09/24/2024    COVID-19 Bivalent 18+ (Moderna) 11/11/2022    COVID-19 MONOVALENT 12+ (Pfizer) 05/04/2021, 05/25/2021    COVID-19 Monovalent 12+ (Pfizer 2022) 02/25/2022    Influenza (H1N1) 12/23/2009    Influenza (IIV3) PF 12/02/2005, 11/07/2007, 11/14/2008, 09/09/2009, 10/05/2010, 09/15/2011, 09/17/2012, 10/05/2014    Influenza (prior to 2024) 10/20/2015    Influenza Vaccine >6 months,quad, PF 10/18/2013, 09/20/2016, 10/10/2017, 11/02/2018, 10/05/2021, 11/28/2022, 11/17/2023    Influenza Vaccine, 6+MO IM (QUADRIVALENT W/PRESERVATIVES) 10/30/2015, 10/03/2019, 11/05/2020    Influenza, Split Virus, Trivalent, Pf (Fluzone\Fluarix) 09/24/2024    Pneumococcal 20 valent Conjugate (Prevnar 20) 01/23/2023    Pneumococcal 23 valent 03/19/2014    TDAP (Adacel,Boostrix) 04/21/2023    TDAP Vaccine (Adacel) 01/04/2012      Today's Vitals: none taken today  ----------------    I spent 18 minutes with this patient today. All changes were made via collaborative practice agreement with Larissa Espinoza MD. A copy of the visit note was provided to the patient's  provider(s).    A summary of these recommendations was sent via [employer].    Glory Quiroz, PharmD  Medication Therapy Management Pharmacist  [facility]     Telemedicine Visit Details  Type of service: Telephone visit  Start Time: 1400  End Time: 1418     Medication Therapy Recommendations  Seronegative inflammatory arthritis   1 Current Medication: methotrexate 2.5 MG tablet (Discontinued)   Current Medication Sig: TAKE 5 TABLETS(12.5 MG) BY MOUTH EVERY 7 DAYS   Rationale: Patient prefers not to take - Adherence - Adherence   Recommendation: Change Medication - Patient requests alternative that is less likely to cause infections and LFT elevations   Status: Accepted per Provider   Identified Date: 1/8/2025 Completed Date: 2/3/2025   Note: Starting leflunomide as alternative

## 2025-01-29 NOTE — Clinical Note
Leflunomide ordered. She wanted to start at lower 10 mg dose, she is worried about it causing infection and/or LFT elevations as with methotrexate. I will follow up with her in 4 weeks. You see her 2/17. Thanks!

## 2025-02-03 RX ORDER — LEFLUNOMIDE 10 MG/1
10 TABLET ORAL DAILY
Qty: 30 TABLET | Refills: 2 | Status: SHIPPED | OUTPATIENT
Start: 2025-02-03

## 2025-02-03 NOTE — PATIENT INSTRUCTIONS
"Recommendations from today's MTM visit:                                                      Start leflunomide 10 mg daily. We will recheck labs in 4 weeks. If the medication is not sufficiently controlling symptoms after 12 weeks and you are tolerating it, we can consider increasing to 20 mg daily.  Continue to consider the following vaccines: Shingrix.    Follow-up: 4 weeks for lab recheck and tolerability, then 3 months. Sees rheumatologist 2/17/25.    It was great speaking with you today.  I value your experience and would be very thankful for your time in providing feedback in our clinic survey. In the next few days, you may receive an email or text message from [facility] [employer] with a link to a survey related to your  clinical pharmacist.\"     To schedule another MTM appointment, please call the clinic directly or you may call the MTM scheduling line at [phone].    My Clinical Pharmacist's contact information:                                                      Please feel free to contact me with any questions or concerns you have.      Glory Quiroz, PharmD  Medication Therapy Management Pharmacist  [facility]    "

## 2025-02-12 ENCOUNTER — MYC MEDICAL ADVICE (OUTPATIENT)
Dept: PULMONOLOGY | Facility: CLINIC | Age: 50
End: 2025-02-12
Payer: MEDICARE

## 2025-02-12 DIAGNOSIS — J84.9 ILD (INTERSTITIAL LUNG DISEASE) (H): ICD-10-CM

## 2025-02-12 DIAGNOSIS — J44.81 OBLITERATIVE BRONCHIOLITIS (H): Primary | ICD-10-CM

## 2025-02-13 RX ORDER — PREDNISONE 10 MG/1
TABLET ORAL
Qty: 134 TABLET | Refills: 0 | Status: SHIPPED | OUTPATIENT
Start: 2025-02-13 | End: 2025-06-20

## 2025-02-24 ENCOUNTER — MYC REFILL (OUTPATIENT)
Dept: FAMILY MEDICINE | Facility: CLINIC | Age: 50
End: 2025-02-24
Payer: MEDICARE

## 2025-02-24 DIAGNOSIS — Z86.79 H/O SINUS TACHYCARDIA: ICD-10-CM

## 2025-02-25 RX ORDER — METOPROLOL SUCCINATE 50 MG/1
100 TABLET, EXTENDED RELEASE ORAL DAILY
Qty: 180 TABLET | Refills: 0 | Status: SHIPPED | OUTPATIENT
Start: 2025-02-25

## 2025-03-10 ENCOUNTER — DOCUMENTATION ONLY (OUTPATIENT)
Dept: LAB | Facility: CLINIC | Age: 50
End: 2025-03-10
Payer: MEDICARE

## 2025-03-11 ENCOUNTER — LAB (OUTPATIENT)
Dept: LAB | Facility: CLINIC | Age: 50
End: 2025-03-11
Payer: MEDICARE

## 2025-03-11 DIAGNOSIS — D84.821 IMMUNOCOMPROMISED STATE DUE TO DRUG THERAPY: ICD-10-CM

## 2025-03-11 DIAGNOSIS — Z79.899 IMMUNOCOMPROMISED STATE DUE TO DRUG THERAPY: ICD-10-CM

## 2025-03-11 DIAGNOSIS — E89.0 HYPOTHYROIDISM FOLLOWING RADIOIODINE THERAPY: ICD-10-CM

## 2025-03-11 LAB
BASOPHILS # BLD AUTO: 0.1 10E3/UL (ref 0–0.2)
BASOPHILS NFR BLD AUTO: 1 %
EOSINOPHIL # BLD AUTO: 0.3 10E3/UL (ref 0–0.7)
EOSINOPHIL NFR BLD AUTO: 3 %
ERYTHROCYTE [DISTWIDTH] IN BLOOD BY AUTOMATED COUNT: 13.2 % (ref 10–15)
HCT VFR BLD AUTO: 43.9 % (ref 35–47)
HGB BLD-MCNC: 13.9 G/DL (ref 11.7–15.7)
IMM GRANULOCYTES # BLD: 0 10E3/UL
IMM GRANULOCYTES NFR BLD: 0 %
LYMPHOCYTES # BLD AUTO: 3.8 10E3/UL (ref 0.8–5.3)
LYMPHOCYTES NFR BLD AUTO: 39 %
MCH RBC QN AUTO: 29.6 PG (ref 26.5–33)
MCHC RBC AUTO-ENTMCNC: 31.7 G/DL (ref 31.5–36.5)
MCV RBC AUTO: 94 FL (ref 78–100)
MONOCYTES # BLD AUTO: 0.9 10E3/UL (ref 0–1.3)
MONOCYTES NFR BLD AUTO: 9 %
NEUTROPHILS # BLD AUTO: 4.7 10E3/UL (ref 1.6–8.3)
NEUTROPHILS NFR BLD AUTO: 48 %
NRBC # BLD AUTO: 0 10E3/UL
NRBC BLD AUTO-RTO: 0 /100
PLATELET # BLD AUTO: 157 10E3/UL (ref 150–450)
RBC # BLD AUTO: 4.69 10E6/UL (ref 3.8–5.2)
WBC # BLD AUTO: 9.8 10E3/UL (ref 4–11)

## 2025-03-11 PROCEDURE — 84443 ASSAY THYROID STIM HORMONE: CPT

## 2025-03-11 PROCEDURE — 85025 COMPLETE CBC W/AUTO DIFF WBC: CPT

## 2025-03-11 PROCEDURE — 84481 FREE ASSAY (FT-3): CPT

## 2025-03-11 PROCEDURE — 36415 COLL VENOUS BLD VENIPUNCTURE: CPT

## 2025-03-11 PROCEDURE — 84439 ASSAY OF FREE THYROXINE: CPT

## 2025-03-13 LAB
T3FREE SERPL-MCNC: 2.4 PG/ML (ref 2–4.4)
T4 FREE SERPL-MCNC: 1.59 NG/DL (ref 0.9–1.7)
TSH SERPL DL<=0.005 MIU/L-ACNC: 0.88 UIU/ML (ref 0.3–4.2)

## 2025-03-15 LAB — MISCELLANEOUS TEST 1 (ARUP): ABNORMAL

## 2025-03-18 ENCOUNTER — MYC MEDICAL ADVICE (OUTPATIENT)
Dept: FAMILY MEDICINE | Facility: CLINIC | Age: 50
End: 2025-03-18
Payer: MEDICARE

## 2025-03-18 DIAGNOSIS — E83.51 LOW CALCIUM LEVELS: Primary | ICD-10-CM

## 2025-03-18 NOTE — TELEPHONE ENCOUNTER
"Let's check how much calcium is in the multivitamin  Assuming it's sufficient, let's plan to repeat the calcium levels in a few weeks with a lab only appointment  Thanks  Milena \"Jc\" SWAPNIL Moreau   "

## 2025-03-18 NOTE — TELEPHONE ENCOUNTER
Jc,    Please see below MyChart message and advise.   See MyC encounter with Rheumatology stating:      3/17/25  4:07 PM  Dr. Alexis Stringer has reviewed your labs and noted you have low serum calcium. She recommends that you follow up with your PCP. Please reach out with any additional questions or concerns.      Thank you!      LINDEN Valerio  Adult Rheumatology    Lab results, for reference, from 3/11/25:      Component 7 d ago   Miscellaneous Test SEE NOTE Abnormal    Comment: Test name  Calcium, Serum or Plasma  8.4 L     mg/dL   8.6-10.0    Performed By: [employer]          Thanks,   Glory NORMAN RN

## 2025-03-18 NOTE — TELEPHONE ENCOUNTER
"Does patient take a multivitamin or extra calcium in general?  Extra Vitamin D (up to 5000 units) and calcium (8209-9654 mg) is good for the bones.  Maybe start with something like this then we can repeat in 1-2 months with a lab only    Thanks  Milena \"Jc\" SWAPNIL Moreau   "

## 2025-03-19 ENCOUNTER — MYC MEDICAL ADVICE (OUTPATIENT)
Dept: FAMILY MEDICINE | Facility: CLINIC | Age: 50
End: 2025-03-19
Payer: MEDICARE

## 2025-03-22 ENCOUNTER — HEALTH MAINTENANCE LETTER (OUTPATIENT)
Age: 50
End: 2025-03-22

## 2025-04-07 ENCOUNTER — VIRTUAL VISIT (OUTPATIENT)
Dept: PHARMACY | Facility: CLINIC | Age: 50
End: 2025-04-07
Attending: INTERNAL MEDICINE
Payer: MEDICARE

## 2025-04-07 DIAGNOSIS — Z71.85 VACCINE COUNSELING: ICD-10-CM

## 2025-04-07 DIAGNOSIS — M13.80 SERONEGATIVE INFLAMMATORY ARTHRITIS: Primary | ICD-10-CM

## 2025-04-07 NOTE — Clinical Note
Patient is open to increasing the leflunomide to 20 mg given she has tolerated 10 mg dose and had partial benefit. Labs are stable. Let me know if that's okay and I'll update order, thanks!

## 2025-04-07 NOTE — PROGRESS NOTES
Medication Therapy Management (MTM) Encounter    ASSESSMENT:                            Medication Adherence/Access: No issues identified.    Seronegative inflammatory arthritis: Patient tolerating leflunomide and labs stable after > 3 months on medication. Subjectively she does think it has been helpful at the 10 mg dose but room for additional benefit. Would recommend increasing to 20 mg daily with lab recheck in 4 weeks.    Vaccine counseling: Indicated for Shingrix per ACIP recommendations for patients on immunosuppressing medications. No changes from last visit.    PLAN:                            Increase leflunomide to 20 mg daily. Will recheck labs in 4 weeks. Report any side effects to the rheumatology department.  Continue to consider the following vaccines: Shingrix.    Follow-up: 4 weeks for lab recheck and tolerability, then 3 months. Sees rheumatologist 2/17/25.    SUBJECTIVE/OBJECTIVE:                          Clemencia Rosales is a 48 year old female called for a follow up visit from 1/29/25. She was referred to me from Larissa Espinoza MD.    Reason for visit: Leflunomide 3 month follow up.    Allergies/ADRs: Reviewed in chart  Past Medical History: Reviewed in chart  Tobacco: She reports that she has never smoked. She has never used smokeless tobacco.  Alcohol: does not drink    Medication Adherence/Access: No issues identified.    Seronegative inflammatory arthritis/bronchiolitis:   Leflunomide 10 mg daily  Albuterol 108 mcg/act HFA as needed  Azithromycin 250 mg daily  Montelukast 10 mg daily  Prednisone 10 mg daily    Previously tried: methotrexate (LFT elevations, concern about side effects), NSAIDs, note sulfa allergy    Started leflunomide early February 2025, has now been on it for ~2 months. She has not had any side effects or increased infections like she did with methotrexate. She missed a few days due to a late refill and noticed some morning stiffness return so she thinks it is working. Does  still have swelling in her ankles. She would be open to increasing the dose to 20 mg. Prednisone 10 mg daily is from pulmonology.    Labs updated 3/11/25, stable outside of low calcium    Vaccine counseling: Open to receiving recommended vaccines.    Immunization History   Administered Date(s) Administered    COVID-19 12+ (Pfizer) 11/06/2023, 09/24/2024    COVID-19 Bivalent 18+ (Moderna) 11/11/2022    COVID-19 MONOVALENT 12+ (Pfizer) 05/04/2021, 05/25/2021    COVID-19 Monovalent 12+ (Pfizer 2022) 02/25/2022    Influenza (H1N1) 12/23/2009    Influenza (IIV3) PF 12/02/2005, 11/07/2007, 11/14/2008, 09/09/2009, 10/05/2010, 09/15/2011, 09/17/2012, 10/05/2014    Influenza (prior to 2024) 10/20/2015    Influenza Vaccine >6 months,quad, PF 10/18/2013, 09/20/2016, 10/10/2017, 11/02/2018, 10/05/2021, 11/28/2022, 11/17/2023    Influenza Vaccine, 6+MO IM (QUADRIVALENT W/PRESERVATIVES) 10/30/2015, 10/03/2019, 11/05/2020    Influenza, Split Virus, Trivalent, Pf (Fluzone\Fluarix) 09/24/2024    Pneumococcal 20 valent Conjugate (Prevnar 20) 01/23/2023    Pneumococcal 23 valent 03/19/2014    TDAP (Adacel,Boostrix) 04/21/2023    TDAP Vaccine (Adacel) 01/04/2012      Today's Vitals: none taken today  ----------------    I spent 23 minutes with this patient today. All changes were made via collaborative practice agreement with Larissa Espinoza MD. A copy of the visit note was provided to the patient's provider(s).    A summary of these recommendations was sent via [email or web address].    Glory Quiroz, PharmD  Medication Therapy Management Pharmacist  [facility]     Telemedicine Visit Details  Type of service: Telephone visit  Start Time: 1108  End Time: 1131     Medication Therapy Recommendations  No medication therapy recommendations to display

## 2025-04-08 NOTE — PATIENT INSTRUCTIONS
"Recommendations from today's MTM visit:                                                      Increase leflunomide to 20 mg daily. Will recheck labs in 4 weeks. Report any side effects to the rheumatology department.  Continue to consider the following vaccines: Shingrix.    Follow-up: 4 weeks for lab recheck and tolerability, then 3 months. Sees rheumatologist 2/17/25.    It was great speaking with you today.  I value your experience and would be very thankful for your time in providing feedback in our clinic survey. In the next few days, you may receive an email or text message from Our Security Team with a link to a survey related to your  clinical pharmacist.\"     To schedule another MTM appointment, please call the clinic directly or you may call the MTM scheduling line at [phone].    My Clinical Pharmacist's contact information:                                                      Please feel free to contact me with any questions or concerns you have.      Glory Quiroz, PharmD  Medication Therapy Management Pharmacist  [facility]    "

## 2025-04-10 RX ORDER — LEFLUNOMIDE 20 MG/1
20 TABLET ORAL DAILY
Qty: 30 TABLET | Refills: 2 | Status: SHIPPED | OUTPATIENT
Start: 2025-04-10

## 2025-05-07 ENCOUNTER — MYC REFILL (OUTPATIENT)
Dept: FAMILY MEDICINE | Facility: CLINIC | Age: 50
End: 2025-05-07
Payer: MEDICARE

## 2025-05-07 DIAGNOSIS — M79.7 FIBROMYALGIA: ICD-10-CM

## 2025-05-07 RX ORDER — CYCLOBENZAPRINE HCL 10 MG
5-10 TABLET ORAL 3 TIMES DAILY PRN
Qty: 90 TABLET | Refills: 0 | Status: SHIPPED | OUTPATIENT
Start: 2025-05-07

## 2025-05-20 ENCOUNTER — MYC REFILL (OUTPATIENT)
Dept: FAMILY MEDICINE | Facility: CLINIC | Age: 50
End: 2025-05-20
Payer: MEDICARE

## 2025-05-20 DIAGNOSIS — J44.81 BRONCHIOLITIS OBLITERANS (H): ICD-10-CM

## 2025-05-21 RX ORDER — ALBUTEROL SULFATE 90 UG/1
INHALANT RESPIRATORY (INHALATION)
Qty: 18 G | Refills: 0 | Status: SHIPPED | OUTPATIENT
Start: 2025-05-21

## 2025-05-25 DIAGNOSIS — Z86.79 H/O SINUS TACHYCARDIA: ICD-10-CM

## 2025-05-27 RX ORDER — METOPROLOL SUCCINATE 50 MG/1
100 TABLET, EXTENDED RELEASE ORAL DAILY
Qty: 180 TABLET | Refills: 0 | Status: SHIPPED | OUTPATIENT
Start: 2025-05-27

## 2025-06-12 DIAGNOSIS — E89.0 HYPOTHYROIDISM FOLLOWING RADIOIODINE THERAPY: Primary | ICD-10-CM

## 2025-06-14 ENCOUNTER — HEALTH MAINTENANCE LETTER (OUTPATIENT)
Age: 50
End: 2025-06-14

## 2025-06-16 ENCOUNTER — MYC MEDICAL ADVICE (OUTPATIENT)
Dept: RHEUMATOLOGY | Facility: CLINIC | Age: 50
End: 2025-06-16
Payer: MEDICARE

## 2025-06-16 DIAGNOSIS — M13.80 SERONEGATIVE INFLAMMATORY ARTHRITIS: ICD-10-CM

## 2025-06-16 NOTE — TELEPHONE ENCOUNTER
Last Written Prescription Date:  10/10/25  Last Fill Quantity: 30,  # refills: 2   Last office visit: Visit date not found ; last virtual visit: Visit date not found with prescribing provider:  Alexis   Future Office Visit: none scheduled    Last labs 3/11/25    Requested Prescriptions   Pending Prescriptions Disp Refills    leflunomide (ARAVA) 20 MG tablet 30 tablet 2     Sig: Take 1 tablet (20 mg) by mouth daily. Labs every 8-12 weeks.       There is no refill protocol information for this order        Nova Green RN

## 2025-06-17 DIAGNOSIS — M13.80 SERONEGATIVE INFLAMMATORY ARTHRITIS: ICD-10-CM

## 2025-06-17 RX ORDER — LEFLUNOMIDE 20 MG/1
TABLET ORAL
Qty: 90 TABLET | OUTPATIENT
Start: 2025-06-17

## 2025-06-17 RX ORDER — LEFLUNOMIDE 20 MG/1
20 TABLET ORAL DAILY
Qty: 30 TABLET | Refills: 2 | Status: SHIPPED | OUTPATIENT
Start: 2025-06-17

## 2025-07-17 ENCOUNTER — MYC MEDICAL ADVICE (OUTPATIENT)
Dept: FAMILY MEDICINE | Facility: CLINIC | Age: 50
End: 2025-07-17
Payer: MEDICARE

## 2025-07-24 DIAGNOSIS — M79.7 FIBROMYALGIA: ICD-10-CM

## 2025-07-24 RX ORDER — CYCLOBENZAPRINE HCL 10 MG
TABLET ORAL
Qty: 90 TABLET | Refills: 0 | Status: SHIPPED | OUTPATIENT
Start: 2025-07-24

## 2025-08-13 DIAGNOSIS — E89.0 HYPOTHYROIDISM FOLLOWING RADIOIODINE THERAPY: Primary | ICD-10-CM

## 2025-08-16 ENCOUNTER — HEALTH MAINTENANCE LETTER (OUTPATIENT)
Age: 50
End: 2025-08-16

## 2025-08-26 ENCOUNTER — MYC REFILL (OUTPATIENT)
Dept: FAMILY MEDICINE | Facility: CLINIC | Age: 50
End: 2025-08-26
Payer: MEDICARE

## 2025-08-26 DIAGNOSIS — Z86.79 H/O SINUS TACHYCARDIA: ICD-10-CM

## 2025-08-26 RX ORDER — METOPROLOL SUCCINATE 50 MG/1
100 TABLET, EXTENDED RELEASE ORAL DAILY
Qty: 180 TABLET | Refills: 0 | Status: SHIPPED | OUTPATIENT
Start: 2025-08-26